# Patient Record
Sex: FEMALE | Race: WHITE | NOT HISPANIC OR LATINO | Employment: OTHER | URBAN - METROPOLITAN AREA
[De-identification: names, ages, dates, MRNs, and addresses within clinical notes are randomized per-mention and may not be internally consistent; named-entity substitution may affect disease eponyms.]

---

## 2023-03-06 ENCOUNTER — TELEPHONE (OUTPATIENT)
Dept: PRIMARY CARE | Facility: CLINIC | Age: 58
End: 2023-03-06
Payer: COMMERCIAL

## 2023-03-06 NOTE — TELEPHONE ENCOUNTER
----- Message from Kj Chowdhury sent at 3/6/2023  9:38 AM EST -----  Regarding: call backl  She called because she was having chest wall pain, and just wanted to talk with you about it!

## 2023-03-06 NOTE — TELEPHONE ENCOUNTER
Patient has occasional heartburn; she also states she has pain inbetween her breasts and back; mid back hurts at times as well. Advised to go to ER if concerned. Also to contact GI about continued heartburn despite Famotidine

## 2023-03-07 ENCOUNTER — TELEPHONE (OUTPATIENT)
Dept: PRIMARY CARE | Facility: CLINIC | Age: 58
End: 2023-03-07

## 2023-03-07 ENCOUNTER — APPOINTMENT (OUTPATIENT)
Dept: PRIMARY CARE | Facility: CLINIC | Age: 58
End: 2023-03-07
Payer: COMMERCIAL

## 2023-03-07 PROBLEM — L30.9 ECZEMA: Status: ACTIVE | Noted: 2023-03-07

## 2023-03-07 PROBLEM — K82.4 GALLBLADDER POLYP: Status: ACTIVE | Noted: 2023-03-07

## 2023-03-07 PROBLEM — Z04.9 CONDITION NOT FOUND: Status: ACTIVE | Noted: 2023-03-07

## 2023-03-07 PROBLEM — E66.3 OVERWEIGHT WITH BODY MASS INDEX (BMI) OF 25 TO 25.9 IN ADULT: Status: ACTIVE | Noted: 2023-03-07

## 2023-03-07 PROBLEM — H40.9 GLAUCOMA: Status: ACTIVE | Noted: 2023-03-07

## 2023-03-07 PROBLEM — E04.2 MULTIPLE THYROID NODULES: Status: ACTIVE | Noted: 2023-03-07

## 2023-03-07 PROBLEM — E55.9 VITAMIN D DEFICIENCY: Status: ACTIVE | Noted: 2023-03-07

## 2023-03-07 PROBLEM — H54.8 LEGALLY BLIND IN RIGHT EYE, AS DEFINED IN USA: Status: ACTIVE | Noted: 2023-03-07

## 2023-03-07 PROBLEM — M54.50 LOW BACK PAIN: Status: ACTIVE | Noted: 2023-03-07

## 2023-03-07 PROBLEM — M54.9 UPPER BACK PAIN: Status: ACTIVE | Noted: 2023-03-07

## 2023-03-07 PROBLEM — M79.7 FIBROMYALGIA: Status: ACTIVE | Noted: 2023-03-07

## 2023-03-07 PROBLEM — M79.601 MUSCULOSKELETAL PAIN OF RIGHT UPPER EXTREMITY: Status: ACTIVE | Noted: 2023-03-07

## 2023-03-07 PROBLEM — R73.03 PREDIABETES: Status: ACTIVE | Noted: 2023-03-07

## 2023-03-07 PROBLEM — J30.2 SEASONAL ALLERGIES: Status: ACTIVE | Noted: 2023-03-07

## 2023-03-07 PROBLEM — G43.909 MIGRAINE: Status: ACTIVE | Noted: 2023-03-07

## 2023-03-07 PROBLEM — R74.8 ELEVATED LIPASE: Status: ACTIVE | Noted: 2023-03-07

## 2023-03-07 PROBLEM — K59.00 CONSTIPATION: Status: ACTIVE | Noted: 2023-03-07

## 2023-03-07 PROBLEM — M25.551 RIGHT HIP PAIN: Status: ACTIVE | Noted: 2023-03-07

## 2023-03-07 PROBLEM — K85.90 PANCREATITIS, ACUTE (HHS-HCC): Status: ACTIVE | Noted: 2023-03-07

## 2023-03-07 PROBLEM — K76.0 FATTY LIVER: Status: ACTIVE | Noted: 2023-03-07

## 2023-03-07 PROBLEM — F41.9 ANXIETY DISORDER: Status: ACTIVE | Noted: 2023-03-07

## 2023-03-07 PROBLEM — L68.0 HIRSUTISM: Status: ACTIVE | Noted: 2023-03-07

## 2023-03-07 PROBLEM — R07.81 CHEST PAIN, PLEURITIC: Status: ACTIVE | Noted: 2023-03-07

## 2023-03-07 PROBLEM — M19.90 OSTEOARTHRITIS (ARTHRITIS DUE TO WEAR AND TEAR OF JOINTS): Status: ACTIVE | Noted: 2023-03-07

## 2023-03-07 PROBLEM — K58.9 IRRITABLE BOWEL SYNDROME: Status: ACTIVE | Noted: 2023-03-07

## 2023-03-07 PROBLEM — E78.00 HIGH BLOOD CHOLESTEROL: Status: ACTIVE | Noted: 2023-03-07

## 2023-03-07 PROBLEM — U07.1 COVID-19: Status: ACTIVE | Noted: 2023-03-07

## 2023-03-07 PROBLEM — D69.6 THROMBOCYTOPENIA (CMS-HCC): Status: ACTIVE | Noted: 2023-03-07

## 2023-03-07 PROBLEM — K82.8 GALLBLADDER SLUDGE: Status: ACTIVE | Noted: 2023-03-07

## 2023-03-07 PROBLEM — K21.9 GASTROESOPHAGEAL REFLUX DISEASE: Status: ACTIVE | Noted: 2023-03-07

## 2023-03-07 PROBLEM — E66.9 OBESITY: Status: ACTIVE | Noted: 2023-03-07

## 2023-03-07 RX ORDER — LATANOPROST 50 UG/ML
SOLUTION/ DROPS OPHTHALMIC
COMMUNITY
Start: 2016-05-23

## 2023-03-07 RX ORDER — METFORMIN HYDROCHLORIDE 500 MG/1
500 TABLET ORAL DAILY
COMMUNITY
Start: 2022-05-25 | End: 2023-06-08 | Stop reason: SDUPTHER

## 2023-03-07 RX ORDER — POLYETHYLENE GLYCOL 3350 17 G/17G
17-34 POWDER, FOR SOLUTION ORAL DAILY
COMMUNITY
End: 2023-10-10 | Stop reason: ALTCHOICE

## 2023-03-07 RX ORDER — LIDOCAINE 50 MG/G
1 OINTMENT TOPICAL 3 TIMES DAILY
COMMUNITY
End: 2023-08-19 | Stop reason: ALTCHOICE

## 2023-03-07 RX ORDER — ATORVASTATIN CALCIUM 20 MG/1
20 TABLET, FILM COATED ORAL DAILY
COMMUNITY
Start: 2013-07-22 | End: 2023-05-18

## 2023-03-07 RX ORDER — GABAPENTIN 300 MG/1
300 CAPSULE ORAL 2 TIMES DAILY
COMMUNITY
Start: 2017-08-23

## 2023-03-07 RX ORDER — PNV NO.95/FERROUS FUM/FOLIC AC 28MG-0.8MG
100 TABLET ORAL DAILY
COMMUNITY
End: 2023-03-18 | Stop reason: ALTCHOICE

## 2023-03-07 RX ORDER — ACETAMINOPHEN 500 MG
50 TABLET ORAL DAILY PRN
COMMUNITY
Start: 2017-05-23 | End: 2023-03-18 | Stop reason: ALTCHOICE

## 2023-03-07 RX ORDER — DICYCLOMINE HYDROCHLORIDE 20 MG/1
20 TABLET ORAL EVERY 6 HOURS PRN
COMMUNITY
Start: 2019-11-04

## 2023-03-07 RX ORDER — MECLIZINE HYDROCHLORIDE 25 MG/1
25 TABLET ORAL 2 TIMES DAILY PRN
COMMUNITY
Start: 2019-07-15

## 2023-03-07 RX ORDER — AMMONIUM LACTATE 12 G/100G
1 LOTION TOPICAL DAILY
COMMUNITY
Start: 2017-02-16

## 2023-03-07 RX ORDER — BRIMONIDINE TARTRATE 1 MG/ML
SOLUTION/ DROPS OPHTHALMIC
COMMUNITY
End: 2023-08-19 | Stop reason: ALTCHOICE

## 2023-03-07 RX ORDER — ACETAMINOPHEN 500 MG
500 TABLET ORAL
COMMUNITY
Start: 2015-06-17

## 2023-03-16 ASSESSMENT — ENCOUNTER SYMPTOMS
HOARSE VOICE: 0
HEADACHES: 1
TROUBLE SWALLOWING: 1
SWOLLEN GLANDS: 0
ABDOMINAL PAIN: 1
NECK PAIN: 1
COUGH: 1
DIARRHEA: 1
SORE THROAT: 1
STRIDOR: 0
SHORTNESS OF BREATH: 1
VOMITING: 0

## 2023-03-17 ENCOUNTER — OFFICE VISIT (OUTPATIENT)
Dept: PRIMARY CARE | Facility: CLINIC | Age: 58
End: 2023-03-17
Payer: COMMERCIAL

## 2023-03-17 VITALS
HEART RATE: 59 BPM | HEIGHT: 59 IN | DIASTOLIC BLOOD PRESSURE: 72 MMHG | OXYGEN SATURATION: 98 % | WEIGHT: 121 LBS | BODY MASS INDEX: 24.39 KG/M2 | TEMPERATURE: 98 F | SYSTOLIC BLOOD PRESSURE: 121 MMHG

## 2023-03-17 DIAGNOSIS — R07.81 CHEST PAIN, PLEURITIC: ICD-10-CM

## 2023-03-17 DIAGNOSIS — R93.89 ABNORMAL CHEST X-RAY: ICD-10-CM

## 2023-03-17 DIAGNOSIS — R07.89 MUSCULOSKELETAL CHEST PAIN: Primary | ICD-10-CM

## 2023-03-17 DIAGNOSIS — R73.03 PREDIABETES: ICD-10-CM

## 2023-03-17 DIAGNOSIS — D69.6 THROMBOCYTOPENIA (CMS-HCC): ICD-10-CM

## 2023-03-17 LAB — POC FINGERSTICK BLOOD GLUCOSE: 89 MG/DL (ref 70–100)

## 2023-03-17 PROCEDURE — 99214 OFFICE O/P EST MOD 30 MIN: CPT | Performed by: PEDIATRICS

## 2023-03-17 PROCEDURE — 82962 GLUCOSE BLOOD TEST: CPT | Performed by: PEDIATRICS

## 2023-03-17 NOTE — PROGRESS NOTES
"Subjective   Occasional chest pain    HPI   Mady Chapa is a 57 y.o. female who presents for dry throat, and sometimes pain in her back and chest when  she breathes. These symptoms have been for a week. She had Covid at [facility] time. Slight cough started a week ago. She also has vague abdominal pains. She states she had an abnormal HIDA and will be seeing a surgeon.    Review of Systems    Objective   /72 (BP Location: Left arm, Patient Position: Sitting)   Pulse 59   Temp 36.7 °C (98 °F)   Ht 1.499 m (4' 11\")   Wt 54.9 kg (121 lb)   SpO2 98%   BMI 24.44 kg/m²     Physical Exam  Throat and oral mucus membranes appear dry  Left  chest wall tender to touch  Lungs clear   Heart reg  Abd soft  Assessment/Plan   Problem List Items Addressed This Visit          Respiratory    Chest pain, pleuritic     Obtained chest xray which showed a vague density in right mid lung. Will order CT            Endocrine/Metabolic    Prediabetes    Relevant Orders    POCT fingerstick glucose manually resulted (Completed)     Other Visit Diagnoses       Musculoskeletal chest pain    -  Primary    Relevant Orders    XR chest 2 views (Completed)    Abnormal chest x-ray        Relevant Orders    CT chest wo IV contrast               "

## 2023-03-18 NOTE — PROGRESS NOTES
Answers submitted by the patient for this visit:  Sore Throat Questionnaire (Submitted on 3/16/2023)  Chief Complaint: Sore throat  Chronicity: new  Onset: 1 to 4 weeks ago  Progression since onset: waxing and waning  Pain worse on: neither  Fever: no fever  Pain - numeric: 5/10  abdominal pain: Yes  congestion: Yes  cough: Yes  diarrhea: Yes  drooling: No  ear discharge: No  ear pain: No  headaches: Yes  hoarse voice: No  neck pain: Yes  plugged ear sensation: No  shortness of breath: Yes  stridor: No  swollen glands: No  trouble swallowing: Yes  vomiting: No  strep: No  mono: No

## 2023-03-29 ENCOUNTER — TELEPHONE (OUTPATIENT)
Dept: PRIMARY CARE | Facility: CLINIC | Age: 58
End: 2023-03-29

## 2023-04-18 ENCOUNTER — TELEPHONE (OUTPATIENT)
Dept: PRIMARY CARE | Facility: CLINIC | Age: 58
End: 2023-04-18
Payer: COMMERCIAL

## 2023-04-22 NOTE — TELEPHONE ENCOUNTER
Told patient report stated she had a nodule and to repeat the test in 6 months. However, I do not see report in chart; it was faxed.

## 2023-04-24 DIAGNOSIS — R91.1 LUNG NODULE: Primary | ICD-10-CM

## 2023-05-02 ENCOUNTER — TELEPHONE (OUTPATIENT)
Dept: PRIMARY CARE | Facility: CLINIC | Age: 58
End: 2023-05-02
Payer: COMMERCIAL

## 2023-05-10 DIAGNOSIS — M25.551 PAIN OF RIGHT HIP: Primary | ICD-10-CM

## 2023-05-15 DIAGNOSIS — E78.00 HIGH BLOOD CHOLESTEROL: Primary | ICD-10-CM

## 2023-05-18 RX ORDER — ATORVASTATIN CALCIUM 20 MG/1
TABLET, FILM COATED ORAL
Qty: 100 TABLET | Refills: 2 | Status: SHIPPED | OUTPATIENT
Start: 2023-05-18 | End: 2023-06-08 | Stop reason: SDUPTHER

## 2023-05-19 ENCOUNTER — TELEPHONE (OUTPATIENT)
Dept: PRIMARY CARE | Facility: CLINIC | Age: 58
End: 2023-05-19

## 2023-05-24 ENCOUNTER — APPOINTMENT (OUTPATIENT)
Dept: PRIMARY CARE | Facility: CLINIC | Age: 58
End: 2023-05-24
Payer: COMMERCIAL

## 2023-05-31 NOTE — TELEPHONE ENCOUNTER
Recommended icing it unless a lot of pain and black under nail in which case it may be a subungal hematoma

## 2023-06-08 ENCOUNTER — OFFICE VISIT (OUTPATIENT)
Dept: PRIMARY CARE | Facility: CLINIC | Age: 58
End: 2023-06-08
Payer: COMMERCIAL

## 2023-06-08 VITALS
HEART RATE: 58 BPM | TEMPERATURE: 96.8 F | DIASTOLIC BLOOD PRESSURE: 75 MMHG | OXYGEN SATURATION: 97 % | RESPIRATION RATE: 16 BRPM | SYSTOLIC BLOOD PRESSURE: 130 MMHG | BODY MASS INDEX: 25.6 KG/M2 | HEIGHT: 59 IN | WEIGHT: 127 LBS

## 2023-06-08 DIAGNOSIS — K59.00 CONSTIPATION, UNSPECIFIED CONSTIPATION TYPE: ICD-10-CM

## 2023-06-08 DIAGNOSIS — K21.9 GASTROESOPHAGEAL REFLUX DISEASE, UNSPECIFIED WHETHER ESOPHAGITIS PRESENT: ICD-10-CM

## 2023-06-08 DIAGNOSIS — R73.03 PREDIABETES: ICD-10-CM

## 2023-06-08 DIAGNOSIS — F41.9 ANXIETY DISORDER, UNSPECIFIED TYPE: ICD-10-CM

## 2023-06-08 DIAGNOSIS — E78.00 HIGH BLOOD CHOLESTEROL: ICD-10-CM

## 2023-06-08 DIAGNOSIS — G43.809 OTHER MIGRAINE WITHOUT STATUS MIGRAINOSUS, NOT INTRACTABLE: ICD-10-CM

## 2023-06-08 DIAGNOSIS — K85.90 ACUTE PANCREATITIS, UNSPECIFIED COMPLICATION STATUS, UNSPECIFIED PANCREATITIS TYPE (HHS-HCC): ICD-10-CM

## 2023-06-08 DIAGNOSIS — M79.7 FIBROMYALGIA: ICD-10-CM

## 2023-06-08 DIAGNOSIS — D69.6 THROMBOCYTOPENIA (CMS-HCC): ICD-10-CM

## 2023-06-08 DIAGNOSIS — R07.81 CHEST PAIN, PLEURITIC: ICD-10-CM

## 2023-06-08 DIAGNOSIS — Z00.00 MEDICARE ANNUAL WELLNESS VISIT, SUBSEQUENT: Primary | ICD-10-CM

## 2023-06-08 DIAGNOSIS — K76.0 FATTY LIVER: ICD-10-CM

## 2023-06-08 DIAGNOSIS — E04.2 MULTIPLE THYROID NODULES: ICD-10-CM

## 2023-06-08 PROBLEM — M79.601 MUSCULOSKELETAL PAIN OF RIGHT UPPER EXTREMITY: Status: RESOLVED | Noted: 2023-03-07 | Resolved: 2023-06-08

## 2023-06-08 PROBLEM — M25.551 RIGHT HIP PAIN: Status: RESOLVED | Noted: 2023-03-07 | Resolved: 2023-06-08

## 2023-06-08 PROBLEM — U07.1 COVID-19: Status: RESOLVED | Noted: 2023-03-07 | Resolved: 2023-06-08

## 2023-06-08 PROBLEM — M54.50 LOW BACK PAIN: Status: RESOLVED | Noted: 2023-03-07 | Resolved: 2023-06-08

## 2023-06-08 PROBLEM — M54.9 UPPER BACK PAIN: Status: RESOLVED | Noted: 2023-03-07 | Resolved: 2023-06-08

## 2023-06-08 PROCEDURE — 99213 OFFICE O/P EST LOW 20 MIN: CPT | Performed by: PEDIATRICS

## 2023-06-08 PROCEDURE — G0439 PPPS, SUBSEQ VISIT: HCPCS | Performed by: PEDIATRICS

## 2023-06-08 PROCEDURE — 1036F TOBACCO NON-USER: CPT | Performed by: PEDIATRICS

## 2023-06-08 RX ORDER — DOCUSATE SODIUM 100 MG/1
100 CAPSULE, LIQUID FILLED ORAL 2 TIMES DAILY
Qty: 180 CAPSULE | Refills: 3 | Status: SHIPPED | OUTPATIENT
Start: 2023-06-08 | End: 2023-08-19 | Stop reason: ALTCHOICE

## 2023-06-08 RX ORDER — METFORMIN HYDROCHLORIDE 500 MG/1
500 TABLET ORAL DAILY
Qty: 90 TABLET | Refills: 1 | Status: SHIPPED | OUTPATIENT
Start: 2023-06-08 | End: 2023-09-13

## 2023-06-08 RX ORDER — LORATADINE 10 MG/1
10 TABLET ORAL
COMMUNITY
Start: 2015-09-29

## 2023-06-08 RX ORDER — ATORVASTATIN CALCIUM 20 MG/1
20 TABLET, FILM COATED ORAL DAILY
Qty: 90 TABLET | Refills: 2 | Status: SHIPPED | OUTPATIENT
Start: 2023-06-08 | End: 2023-11-21

## 2023-06-08 ASSESSMENT — PAIN SCALES - GENERAL: PAINLEVEL: 6

## 2023-06-08 ASSESSMENT — PATIENT HEALTH QUESTIONNAIRE - PHQ9
SUM OF ALL RESPONSES TO PHQ9 QUESTIONS 1 AND 2: 0
1. LITTLE INTEREST OR PLEASURE IN DOING THINGS: NOT AT ALL
2. FEELING DOWN, DEPRESSED OR HOPELESS: NOT AT ALL

## 2023-06-08 ASSESSMENT — ACTIVITIES OF DAILY LIVING (ADL)
BATHING: INDEPENDENT
DOING_HOUSEWORK: INDEPENDENT
MANAGING_FINANCES: INDEPENDENT
DRESSING: INDEPENDENT
TAKING_MEDICATION: INDEPENDENT
GROCERY_SHOPPING: INDEPENDENT

## 2023-06-08 NOTE — ASSESSMENT & PLAN NOTE
Occasional; sometimes worse with a deep breath; pushing on the area aggravates it when she gets it; occurs once a week; taking Tylenol helps

## 2023-06-08 NOTE — PROGRESS NOTES
"Subjective   Reason for Visit: Mady Chapa is an 58 y.o. female here for a Medicare Wellness visit.          Reviewed all medications by prescribing practitioner or clinical pharmacist (such as prescriptions, OTCs, herbal therapies and supplements) and documented in the medical record.    HPI  Patient with emotional disorder, migraine, prediabetes and thyroid nodules is here for check up. She sees GYN at [facility] who orders her mammogram.  Her main complaint is right lower abdominal pain that occurs once or twice a week  and may be 6/10 and may last a couple of days; Passing gas or having BM sometimes helps;     Had colonoscopy in 2020. Next one due 2025  Patient Care Team:  Sil Pike MD as PCP - General  Sil Pike MD as PCP - [employer] PCP     Review of Systems  Spilled hot coffee on legs this morning with no untoward effect;  Closed left fourth nail in a door a week ago;   Objective   Vitals:  /75 (BP Location: Left arm, Patient Position: Sitting, BP Cuff Size: Adult)   Pulse 58   Temp 36 °C (96.8 °F) (Temporal)   Resp 16   Ht 1.499 m (4' 11\")   Wt 57.6 kg (127 lb)   LMP  (LMP Unknown)   SpO2 97%   BMI 25.65 kg/m²       Physical Exam  Lungs clear  Heart reg  Abd soft and nontender  Tender fibromyalgia trigger points; tender iliac crests  Left fourth digit has a speck of subungal black discoloration in middle of the nail  Assessment/Plan   Problem List Items Addressed This Visit          Other    High blood cholesterol     Problem List Items Addressed This Visit          Respiratory    Chest pain, pleuritic - Primary    Current Assessment & Plan     Occasional; sometimes worse with a deep breath; pushing on the area aggravates it when she gets it; occurs once a week; taking Tylenol helps            Digestive    Constipation    Current Assessment & Plan     Takes Miralax for that along with Benefiber; has Docusate as well if needed         Fatty liver    Current Assessment & Plan "     Sees Dr Hanna Martin at [facility]         Gastroesophageal reflux disease    Current Assessment & Plan     Takes Famotidine once or twice a week; may get throat pain if her heartburn acts up         Pancreatitis, acute    Current Assessment & Plan     Had acute pancreatitis in January; She will get MRI of pancreas this month.            Endocrine/Metabolic    Multiple thyroid nodules    Prediabetes       Hematologic    Thrombocytopenia (CMS/HCC)    Current Assessment & Plan     Sees hematologist for this            Other    Anxiety disorder    Current Assessment & Plan     Was seeing a counselor; willing to talk to a psychiatrist         High blood cholesterol    Migraine    Current Assessment & Plan     Dr Flakita Coyle neurologist treats her with butalbital or Relpax

## 2023-06-09 ENCOUNTER — TELEPHONE (OUTPATIENT)
Dept: PRIMARY CARE | Facility: CLINIC | Age: 58
End: 2023-06-09
Payer: COMMERCIAL

## 2023-06-20 ENCOUNTER — TELEPHONE (OUTPATIENT)
Dept: PRIMARY CARE | Facility: CLINIC | Age: 58
End: 2023-06-20
Payer: COMMERCIAL

## 2023-07-07 ENCOUNTER — TELEPHONE (OUTPATIENT)
Dept: PRIMARY CARE | Facility: CLINIC | Age: 58
End: 2023-07-07
Payer: COMMERCIAL

## 2023-07-16 ASSESSMENT — ENCOUNTER SYMPTOMS
ABDOMINAL PAIN: 1
DIARRHEA: 1
DYSURIA: 0
MYALGIAS: 1
ARTHRALGIAS: 1
FLATUS: 0
VOMITING: 0
ANOREXIA: 0
HEMATOCHEZIA: 0
HEMATURIA: 0
NAUSEA: 0
WEIGHT LOSS: 0
CONSTIPATION: 1
FREQUENCY: 0
HEADACHES: 1
FEVER: 0

## 2023-07-18 ASSESSMENT — ENCOUNTER SYMPTOMS
WEIGHT LOSS: 0
DIARRHEA: 1
NAUSEA: 0
ANOREXIA: 0
FEVER: 0
HEADACHES: 1
DYSURIA: 0
ARTHRALGIAS: 1
MYALGIAS: 1
ABDOMINAL PAIN: 1
HEMATOCHEZIA: 0
FREQUENCY: 0
CONSTIPATION: 1
FLATUS: 0
VOMITING: 0
HEMATURIA: 0

## 2023-07-19 ENCOUNTER — APPOINTMENT (OUTPATIENT)
Dept: PRIMARY CARE | Facility: CLINIC | Age: 58
End: 2023-07-19
Payer: COMMERCIAL

## 2023-07-19 ENCOUNTER — TELEPHONE (OUTPATIENT)
Dept: PRIMARY CARE | Facility: CLINIC | Age: 58
End: 2023-07-19

## 2023-08-02 ENCOUNTER — TELEPHONE (OUTPATIENT)
Dept: PRIMARY CARE | Facility: CLINIC | Age: 58
End: 2023-08-02
Payer: COMMERCIAL

## 2023-08-10 ENCOUNTER — TELEPHONE (OUTPATIENT)
Dept: PRIMARY CARE | Facility: CLINIC | Age: 58
End: 2023-08-10

## 2023-08-11 ENCOUNTER — APPOINTMENT (OUTPATIENT)
Dept: PRIMARY CARE | Facility: CLINIC | Age: 58
End: 2023-08-11
Payer: COMMERCIAL

## 2023-08-15 ENCOUNTER — OFFICE VISIT (OUTPATIENT)
Dept: PRIMARY CARE | Facility: CLINIC | Age: 58
End: 2023-08-15
Payer: COMMERCIAL

## 2023-08-15 VITALS
BODY MASS INDEX: 26.41 KG/M2 | SYSTOLIC BLOOD PRESSURE: 136 MMHG | WEIGHT: 131 LBS | RESPIRATION RATE: 16 BRPM | HEIGHT: 59 IN | TEMPERATURE: 97.1 F | HEART RATE: 54 BPM | OXYGEN SATURATION: 98 % | DIASTOLIC BLOOD PRESSURE: 73 MMHG

## 2023-08-15 DIAGNOSIS — R07.81 CHEST PAIN, PLEURITIC: Primary | ICD-10-CM

## 2023-08-15 DIAGNOSIS — E78.00 HIGH BLOOD CHOLESTEROL: ICD-10-CM

## 2023-08-15 DIAGNOSIS — M54.6 ACUTE MIDLINE THORACIC BACK PAIN: ICD-10-CM

## 2023-08-15 PROCEDURE — 1036F TOBACCO NON-USER: CPT | Performed by: PEDIATRICS

## 2023-08-15 PROCEDURE — 93000 ELECTROCARDIOGRAM COMPLETE: CPT | Performed by: PEDIATRICS

## 2023-08-15 PROCEDURE — 99214 OFFICE O/P EST MOD 30 MIN: CPT | Performed by: PEDIATRICS

## 2023-08-15 RX ORDER — HYDROGEN PEROXIDE 3 %
SOLUTION, NON-ORAL MISCELLANEOUS
COMMUNITY
End: 2023-08-19 | Stop reason: ALTCHOICE

## 2023-08-15 RX ORDER — SPIRONOLACTONE 50 MG/1
TABLET, FILM COATED ORAL
COMMUNITY
Start: 2023-07-27 | End: 2023-08-19 | Stop reason: ALTCHOICE

## 2023-08-15 ASSESSMENT — PAIN SCALES - GENERAL: PAINLEVEL: 6

## 2023-08-15 ASSESSMENT — PATIENT HEALTH QUESTIONNAIRE - PHQ9
SUM OF ALL RESPONSES TO PHQ9 QUESTIONS 1 AND 2: 0
2. FEELING DOWN, DEPRESSED OR HOPELESS: NOT AT ALL
1. LITTLE INTEREST OR PLEASURE IN DOING THINGS: NOT AT ALL

## 2023-08-15 NOTE — PROGRESS NOTES
"Subjective   Patient ID: Mady Chapa is a 58 y.o. female who presents for Back Pain, Hip Pain, and Chest Pain.    HPI   Patient said that she has back pain that hurts in between her shoulder blades for 2 weeks; she is better with ice or Tylenol; No injuries; the other day, she almost tripped causing her twist and grab onto a table; When she did that she felt a pain in left hip that lasted a couple of days but got better with ice and Tylenol. Pain in back is 6/10 and pain in 5/10.  She states that sometimes it is hard to breathe when her back hurts.   She felt a stabbing pain in her chest this morning; she pressed on her chest and found it was sore. She went back to sleep. She has a little chest pain still and it is a bit pleuritic in nature; she is sometimes short of breath she states as well with this.    Review of Systems    Objective   /73 (BP Location: Left arm, Patient Position: Sitting, BP Cuff Size: Adult)   Pulse 54   Temp 36.2 °C (97.1 °F) (Temporal)   Resp 16   Ht 1.499 m (4' 11\")   Wt 59.4 kg (131 lb)   SpO2 98%   BMI 26.46 kg/m²     Physical Exam  Lungs clear  Heart reg  Cannot reproduce chest pain at this time  Mid back somewhat tender (mildly)  Good ROM of left hip  Assessment/Plan   Problem List Items Addressed This Visit       Chest pain, pleuritic - Primary    Relevant Orders    ECG 12 lead (Clinic Performed) (Completed)    CT angio chest for pulmonary embolism (Completed)    High blood cholesterol    Relevant Orders    CT cardiac scoring wo IV contrast    Thoracic back pain          "

## 2023-08-15 NOTE — TELEPHONE ENCOUNTER
CARDIAC SCORE TEST ORDER NEEDS TO BE FAXED TO [facility].  PLEASE FAX OVER ORDER  FAX# [phone]  PLEASE CALL ( NEGRITO) WITH EKG RESULTS FROM TODAY

## 2023-08-18 DIAGNOSIS — R93.89 ABNORMAL CHEST CT: Primary | ICD-10-CM

## 2023-08-21 ENCOUNTER — HOSPITAL ENCOUNTER (OUTPATIENT)
Dept: DATA CONVERSION | Facility: HOSPITAL | Age: 58
End: 2023-08-21

## 2023-08-21 DIAGNOSIS — R93.89 ABNORMAL FINDINGS ON DIAGNOSTIC IMAGING OF OTHER SPECIFIED BODY STRUCTURES: ICD-10-CM

## 2023-08-25 PROBLEM — N60.19 FIBROCYSTIC BREAST DISEASE (FCBD): Status: ACTIVE | Noted: 2023-08-25

## 2023-08-25 PROBLEM — G90.09 IDIOPATHIC PERIPHERAL AUTONOMIC NEUROPATHY: Status: ACTIVE | Noted: 2023-08-25

## 2023-08-25 PROBLEM — N20.0 KIDNEY STONE: Status: ACTIVE | Noted: 2017-02-28

## 2023-08-25 PROBLEM — K81.1 CHRONIC CHOLECYSTITIS: Status: ACTIVE | Noted: 2023-03-21

## 2023-08-25 PROBLEM — R93.5 ABNORMAL CT OF THE ABDOMEN: Status: ACTIVE | Noted: 2023-01-09

## 2023-08-25 PROBLEM — N95.1 MENOPAUSAL SYMPTOMS: Status: ACTIVE | Noted: 2023-08-25

## 2023-08-25 PROBLEM — R27.0 ATAXIA: Status: ACTIVE | Noted: 2023-08-25

## 2023-08-25 PROBLEM — I83.899 VARICOSE VEINS OF LEG WITH SWELLING: Status: ACTIVE | Noted: 2023-08-25

## 2023-09-11 DIAGNOSIS — R73.03 PREDIABETES: ICD-10-CM

## 2023-09-12 ENCOUNTER — APPOINTMENT (OUTPATIENT)
Dept: PRIMARY CARE | Facility: CLINIC | Age: 58
End: 2023-09-12
Payer: COMMERCIAL

## 2023-09-13 RX ORDER — METFORMIN HYDROCHLORIDE 500 MG/1
500 TABLET ORAL DAILY
Qty: 100 TABLET | Refills: 2 | Status: SHIPPED | OUTPATIENT
Start: 2023-09-13 | End: 2023-09-16

## 2023-09-14 DIAGNOSIS — R73.03 PREDIABETES: ICD-10-CM

## 2023-09-16 RX ORDER — METFORMIN HYDROCHLORIDE 500 MG/1
500 TABLET ORAL DAILY
Qty: 100 TABLET | Refills: 2 | Status: SHIPPED | OUTPATIENT
Start: 2023-09-16 | End: 2024-06-06

## 2023-10-05 ENCOUNTER — TELEPHONE (OUTPATIENT)
Dept: PRIMARY CARE | Facility: CLINIC | Age: 58
End: 2023-10-05

## 2023-10-05 NOTE — TELEPHONE ENCOUNTER
"Patient called \"MADY HAS PAIN -Left shoulder and middle left breast - took tylenol - very little help---but did help --still hurts presses on left breast it does hurt when she presses, acid in throat sensation please call --Mady [phone]  "

## 2023-10-08 ASSESSMENT — ENCOUNTER SYMPTOMS
NAUSEA: 0
ARTHRALGIAS: 1
HEMATOCHEZIA: 0
VOMITING: 0
HEADACHES: 1
CONSTIPATION: 1
FEVER: 0
ANOREXIA: 0
DYSURIA: 0
FREQUENCY: 0
FLATUS: 0
ABDOMINAL PAIN: 1
DIARRHEA: 1
MYALGIAS: 1
WEIGHT LOSS: 0
HEMATURIA: 0

## 2023-10-09 ENCOUNTER — TELEPHONE (OUTPATIENT)
Dept: HEMATOLOGY/ONCOLOGY | Facility: CLINIC | Age: 58
End: 2023-10-09
Payer: COMMERCIAL

## 2023-10-10 ENCOUNTER — OFFICE VISIT (OUTPATIENT)
Dept: PRIMARY CARE | Facility: CLINIC | Age: 58
End: 2023-10-10
Payer: COMMERCIAL

## 2023-10-10 VITALS
HEART RATE: 53 BPM | SYSTOLIC BLOOD PRESSURE: 132 MMHG | BODY MASS INDEX: 26.61 KG/M2 | WEIGHT: 132 LBS | RESPIRATION RATE: 16 BRPM | HEIGHT: 59 IN | OXYGEN SATURATION: 97 % | TEMPERATURE: 97.1 F | DIASTOLIC BLOOD PRESSURE: 70 MMHG

## 2023-10-10 DIAGNOSIS — N60.19 FIBROCYSTIC BREAST DISEASE (FCBD), UNSPECIFIED LATERALITY: ICD-10-CM

## 2023-10-10 DIAGNOSIS — M79.7 FIBROMYALGIA: ICD-10-CM

## 2023-10-10 DIAGNOSIS — K81.1 CHRONIC CHOLECYSTITIS: ICD-10-CM

## 2023-10-10 DIAGNOSIS — J30.2 SEASONAL ALLERGIES: ICD-10-CM

## 2023-10-10 DIAGNOSIS — N95.1 MENOPAUSAL SYMPTOM: ICD-10-CM

## 2023-10-10 DIAGNOSIS — F41.9 ANXIETY DISORDER, UNSPECIFIED TYPE: ICD-10-CM

## 2023-10-10 DIAGNOSIS — K76.0 FATTY LIVER: ICD-10-CM

## 2023-10-10 DIAGNOSIS — G43.809 OTHER MIGRAINE WITHOUT STATUS MIGRAINOSUS, NOT INTRACTABLE: ICD-10-CM

## 2023-10-10 DIAGNOSIS — E78.00 HIGH BLOOD CHOLESTEROL: ICD-10-CM

## 2023-10-10 DIAGNOSIS — R93.89 ABNORMAL CT OF THE CHEST: ICD-10-CM

## 2023-10-10 DIAGNOSIS — R07.81 CHEST PAIN, PLEURITIC: ICD-10-CM

## 2023-10-10 DIAGNOSIS — G90.09 IDIOPATHIC PERIPHERAL AUTONOMIC NEUROPATHY: ICD-10-CM

## 2023-10-10 DIAGNOSIS — K85.90 ACUTE PANCREATITIS, UNSPECIFIED COMPLICATION STATUS, UNSPECIFIED PANCREATITIS TYPE (HHS-HCC): ICD-10-CM

## 2023-10-10 DIAGNOSIS — E55.9 VITAMIN D DEFICIENCY: ICD-10-CM

## 2023-10-10 DIAGNOSIS — M79.18 MUSCULOSKELETAL PAIN: ICD-10-CM

## 2023-10-10 DIAGNOSIS — N20.0 KIDNEY STONE: ICD-10-CM

## 2023-10-10 DIAGNOSIS — K21.9 GASTROESOPHAGEAL REFLUX DISEASE, UNSPECIFIED WHETHER ESOPHAGITIS PRESENT: ICD-10-CM

## 2023-10-10 DIAGNOSIS — I83.899 VARICOSE VEINS OF LOWER EXTREMITY WITH EDEMA, UNSPECIFIED LATERALITY: ICD-10-CM

## 2023-10-10 DIAGNOSIS — R73.03 PREDIABETES: ICD-10-CM

## 2023-10-10 DIAGNOSIS — D69.6 THROMBOCYTOPENIA (CMS-HCC): ICD-10-CM

## 2023-10-10 DIAGNOSIS — E04.2 MULTIPLE THYROID NODULES: ICD-10-CM

## 2023-10-10 DIAGNOSIS — K59.00 CONSTIPATION, UNSPECIFIED CONSTIPATION TYPE: ICD-10-CM

## 2023-10-10 DIAGNOSIS — Z00.00 MEDICARE ANNUAL WELLNESS VISIT, SUBSEQUENT: Primary | ICD-10-CM

## 2023-10-10 PROBLEM — Z04.9 CONDITION NOT FOUND: Status: RESOLVED | Noted: 2023-03-07 | Resolved: 2023-10-10

## 2023-10-10 PROBLEM — R27.0 ATAXIA: Status: RESOLVED | Noted: 2023-08-25 | Resolved: 2023-10-10

## 2023-10-10 PROCEDURE — G0008 ADMIN INFLUENZA VIRUS VAC: HCPCS | Performed by: PEDIATRICS

## 2023-10-10 PROCEDURE — 90686 IIV4 VACC NO PRSV 0.5 ML IM: CPT | Performed by: PEDIATRICS

## 2023-10-10 PROCEDURE — 80053 COMPREHEN METABOLIC PANEL: CPT | Performed by: PEDIATRICS

## 2023-10-10 PROCEDURE — G0439 PPPS, SUBSEQ VISIT: HCPCS | Performed by: PEDIATRICS

## 2023-10-10 PROCEDURE — 1036F TOBACCO NON-USER: CPT | Performed by: PEDIATRICS

## 2023-10-10 PROCEDURE — 80061 LIPID PANEL: CPT | Performed by: PEDIATRICS

## 2023-10-10 PROCEDURE — 82306 VITAMIN D 25 HYDROXY: CPT | Performed by: PEDIATRICS

## 2023-10-10 RX ORDER — SPIRONOLACTONE 50 MG/1
1 TABLET, FILM COATED ORAL 2 TIMES DAILY
COMMUNITY
Start: 2023-10-04 | End: 2023-10-10 | Stop reason: ALTCHOICE

## 2023-10-10 RX ORDER — LIDOCAINE 40 MG/G
CREAM TOPICAL 4 TIMES DAILY PRN
Qty: 50 G | Refills: 3 | Status: SHIPPED | OUTPATIENT
Start: 2023-10-10 | End: 2023-10-24 | Stop reason: WASHOUT

## 2023-10-10 ASSESSMENT — PATIENT HEALTH QUESTIONNAIRE - PHQ9
10. IF YOU CHECKED OFF ANY PROBLEMS, HOW DIFFICULT HAVE THESE PROBLEMS MADE IT FOR YOU TO DO YOUR WORK, TAKE CARE OF THINGS AT HOME, OR GET ALONG WITH OTHER PEOPLE: NOT DIFFICULT AT ALL
2. FEELING DOWN, DEPRESSED OR HOPELESS: SEVERAL DAYS
SUM OF ALL RESPONSES TO PHQ9 QUESTIONS 1 AND 2: 1
1. LITTLE INTEREST OR PLEASURE IN DOING THINGS: NOT AT ALL

## 2023-10-10 ASSESSMENT — ENCOUNTER SYMPTOMS
HEMATOCHEZIA: 0
VOMITING: 0
HEMATURIA: 0
HEADACHES: 1
FREQUENCY: 0
WEIGHT LOSS: 0
ANOREXIA: 0
CONSTIPATION: 1
FEVER: 0
ABDOMINAL PAIN: 1
DYSURIA: 0
DIARRHEA: 1
NAUSEA: 0
ARTHRALGIAS: 1
MYALGIAS: 1
FLATUS: 0

## 2023-10-10 ASSESSMENT — PAIN SCALES - GENERAL: PAINLEVEL: 6

## 2023-10-10 NOTE — PROGRESS NOTES
"Subjective   Patient ID: Mady Chapa is a 58 y.o. female who presents for prediabetes    Abdominal Pain  This is a recurrent problem. The current episode started more than 1 year ago. The onset quality is undetermined. The problem occurs every several days. The most recent episode lasted 4 days. The problem has been waxing and waning. The pain is located in the generalized abdominal region and right flank. The pain is at a severity of 7/10. The quality of the pain is sharp. The abdominal pain radiates to the LUQ, RUQ, periumbilical region, suprapubic region, back and right flank. Associated symptoms include arthralgias, constipation, diarrhea, headaches and myalgias. Pertinent negatives include no anorexia, dysuria, fever, flatus, frequency, hematochezia, hematuria, melena, nausea, vomiting or weight loss. The pain is aggravated by certain positions and movement. The pain is relieved by Activity.      GYN orders mammogram  Colonoscopy due 2025  Review of Systems   Constitutional:  Negative for fever and weight loss.   Gastrointestinal:  Positive for abdominal pain, constipation and diarrhea. Negative for anorexia, flatus, hematochezia, melena, nausea and vomiting.   Genitourinary:  Negative for dysuria, frequency and hematuria.   Musculoskeletal:  Positive for arthralgias and myalgias.   Neurological:  Positive for headaches.     Stubbed her left fourth toe; right sacrum slightly tender  Objective   /70 (BP Location: Right arm, Patient Position: Sitting, BP Cuff Size: Adult)   Pulse 53   Temp 36.2 °C (97.1 °F) (Temporal)   Resp 16   Ht 1.499 m (4' 11\")   Wt 59.9 kg (132 lb)   LMP  (LMP Unknown)   SpO2 97%   BMI 26.66 kg/m²     Physical Exam  Vitals reviewed.   Constitutional:       General: She is not in acute distress.  HENT:      Head: Normocephalic.      Right Ear: Tympanic membrane normal.      Left Ear: Tympanic membrane normal.      Nose: Nose normal.      Mouth/Throat:      Pharynx: Oropharynx " is clear.   Cardiovascular:      Rate and Rhythm: Normal rate and regular rhythm.      Heart sounds: Normal heart sounds.   Pulmonary:      Breath sounds: Normal breath sounds.   Abdominal:      Palpations: Abdomen is soft.      Tenderness: There is no abdominal tenderness.   Musculoskeletal:         General: No tenderness.      Comments: Tender right sacroiliac   Skin:     Findings: No rash.   Neurological:      General: No focal deficit present.      Mental Status: She is alert.   Psychiatric:         Mood and Affect: Mood normal.         Assessment/Plan   Problem List Items Addressed This Visit             ICD-10-CM    Anxiety disorder F41.9     Psychiatrist started her on duloxetine that will start soon         Constipation K59.00     Due to IBS, she has constipation alternating with diarrhea; she is on Metamucil and Miralax except when she has diarrhea         Chest pain, pleuritic R07.81     Once or twice her chest and back hurt; tylenol helps; reproduced by pressing on chest         Fatty liver K76.0     Patient asked to check if she had a fibroscan         Relevant Orders    Comprehensive Metabolic Panel    Fibromyalgia M79.7     Will start duloxetine; not bad pain         Gastroesophageal reflux disease K21.9     Doing well on Famotidine         High blood cholesterol E78.00     Takes Atorvastatin 20         Relevant Orders    Lipid Panel    Migraine G43.909     Gets it once or twice a week; sees dr Coyle         Multiple thyroid nodules E04.2     Saw Dr Luna last year who recommended repeat ultrasound which is scheduled         RESOLVED: Pancreatitis, acute K85.90    Prediabetes R73.03     Last sugar in the 80s         Seasonal allergies J30.2     Takes claritin prn         Thrombocytopenia (CMS/HCC) D69.6     Sees hematologist; platelets in the 80K range         Vitamin D deficiency E55.9    Relevant Orders    Vitamin D 25-Hydroxy,Total (for eval of Vitamin D levels)    Chronic cholecystitis K81.1      No more pain; seeing GI specialist and surgeon; will be following up in December         Fibrocystic breast disease (FCBD) N60.19     Breast pain is better by cutting caffeine         Idiopathic peripheral autonomic neuropathy G90.09     Numbness in hands and feet--followed by dr Coyle         Kidney stone N20.0     Followed by Dr Waldron         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

## 2023-10-10 NOTE — ASSESSMENT & PLAN NOTE
Due to IBS, she has constipation alternating with diarrhea; she is on Metamucil and Miralax except when she has diarrhea

## 2023-10-10 NOTE — PROGRESS NOTES
Pt. Is here for prediabetes follow up,right hip pain andAnswers submitted by the patient for this visit:  Abdominal Pain Questionnaire (Submitted on 10/8/2023)  Chief Complaint: Abdominal pain  Chronicity: recurrent  Onset: more than 1 year ago  Onset quality: undetermined  Frequency: every several days  Episode duration: 4 Days  Progression since onset: waxing and waning  Pain location: generalized abdominal region, right flank  Pain - numeric: 7/10  Pain quality: sharp  Radiates to: LUQ, RUQ, periumbilical region, suprapubic region, back, right flank  anorexia: No  arthralgias: Yes  constipation: Yes  diarrhea: Yes  dysuria: No  fever: No  flatus: No  frequency: No  headaches: Yes  hematochezia: No  hematuria: No  melena: No  myalgias: Yes  nausea: No  weight loss: No  vomiting: No  Aggravated by: certain positions, movement  Relieved by: activity   left toe pain.

## 2023-10-10 NOTE — PROGRESS NOTES
Answers submitted by the patient for this visit:  Abdominal Pain Questionnaire (Submitted on 10/8/2023)  Chief Complaint: Abdominal pain  Chronicity: recurrent  Onset: more than 1 year ago  Onset quality: undetermined  Frequency: every several days  Episode duration: 4 Days  Progression since onset: waxing and waning  Pain location: generalized abdominal region, right flank  Pain - numeric: 7/10  Pain quality: sharp  Radiates to: LUQ, RUQ, periumbilical region, suprapubic region, back, right flank  anorexia: No  arthralgias: Yes  constipation: Yes  diarrhea: Yes  dysuria: No  fever: No  flatus: No  frequency: No  headaches: Yes  hematochezia: No  hematuria: No  melena: No  myalgias: Yes  nausea: No  weight loss: No  vomiting: No  Aggravated by: certain positions, movement  Relieved by: activity

## 2023-10-13 DIAGNOSIS — D69.6 THROMBOCYTOPENIA (CMS-HCC): Primary | ICD-10-CM

## 2023-10-13 NOTE — TELEPHONE ENCOUNTER
Spoke to patient and explained per Ariel she can start the medication, however, will need monthly CBC's. Requested patient to call office when CBC is draw to review. Patient verbalized understanding and agreement regarding discussed information via verbal feedback.

## 2023-10-15 DIAGNOSIS — K76.0 FATTY LIVER: Primary | ICD-10-CM

## 2023-10-17 ENCOUNTER — TELEPHONE (OUTPATIENT)
Dept: PRIMARY CARE | Facility: CLINIC | Age: 58
End: 2023-10-17

## 2023-10-19 NOTE — PROGRESS NOTES
Patient: Mady Chapa    [id number]  : 1965 -- AGE 58 y.o.    Provider: TRAVIS Ryder-CNP     Location [facility]   Service Date: 10/20/2023       Department of Medicine  Division of Pulmonary, Critical Care, and Sleep Medicine           [facility]  New Visit Note    HISTORY OF PRESENT ILLNESS     The patient's referring provider is: Dr. Sil Pike    PCP: Dr. Sil Pike  Heme: Ariel Rosas CNP    HISTORY OF PRESENT ILLNESS   Mady Chapa is a 58 y.o. female who presents to a [facility] for an evaluation with concerns of Abnormal CT Scan. I have independently interviewed and examined the patient in the office and reviewed available records.    Patient had a CT chest on 2023 which showed focal area of tree-in-bud nodularity in the posterior segment of the right upper lobe with sequela of atypical infection (?  GAYLE/fungal).  A repeat CT chest was then completed 2023.  Finding showed persistent but slightly improved area of centrilobular and tree-in-bud nodularity in the right upper lobe.  I personally reviewed these images.  The 4 mm nodule in the right upper lobe is partially calcified and unchanged from the prior scan; the tree-in-bud inflammatory findings do appear slightly better from the previous scan; overall, not anything too overwhelming from my standpoint.    Current History  She is here today with her boyfriend and .    On today's visit, the patient reports that she reports some mild intermittent LADD. Can experience when going up stairs but this is nothing new and does not limit her daily activities. Feels like she has difficulty with belching as well? Sees a GI; talking about getting an endoscopy. Was advised Pepcid. Has a virtual appointment a couple weeks ago. Denies any chronic cough. On rare occasion of cough, it is not productive. Does have chronic sinus issues and takes  Claritin PRN; helpful when taken.  Denies wheezing. She denies fevers, chills. Intermittent sweats. Nothing out of the ordinary for her. Does have chest wall pain intermittently which sounds like costochondritis. Upon laying down, does have intermittent GERD and belching symptoms. She very occasionally will experience coughing with eating/drinking. Occurs maybe 1-2x a week. Weight has been stable.    Denies any childhood lung problems. Never been hospitalized for lung problems. Has had pneumonia and bronchitis before.    Has never had a sleep study before. She does snore. Boyfriend has noticed some abnormal breathing patterns. Has had episodes of choking/gasping. Does admit to daytime fatigue.  CAT: 8    REVIEW OF SYSTEMS     REVIEW OF SYSTEMS  Review of Systems   Constitutional:  Negative for activity change, appetite change, chills, fatigue, fever and unexpected weight change.   HENT:  Negative for congestion, postnasal drip, rhinorrhea, sinus pressure, sinus pain, sneezing, sore throat, trouble swallowing and voice change.    Eyes:  Negative for redness and itching.   Respiratory:  Negative for cough, chest tightness, shortness of breath, wheezing and stridor.    Cardiovascular:  Negative for chest pain, palpitations and leg swelling.        Denies orthopnea   Gastrointestinal:  Negative for abdominal pain, diarrhea and nausea.        Denies acid reflux   Musculoskeletal:  Positive for arthralgias, back pain and myalgias. Negative for joint swelling.   Skin:  Negative for rash.   Allergic/Immunologic: Negative for environmental allergies and immunocompromised state.   Neurological:  Positive for dizziness. Negative for tremors, weakness and headaches.   Hematological:  Does not bruise/bleed easily.   Psychiatric/Behavioral:  Negative for agitation and sleep disturbance. The patient is nervous/anxious.         Claims depression   All other systems reviewed and are negative.    ALLERGIES AND MEDICATIONS      ALLERGIES  Allergies   Allergen Reactions    Amitriptyline Angioedema    Biotin Swelling    Clindamycin Unknown    Doxycycline Unknown    Latex Other    Metronidazole Diarrhea    Nortriptyline Unknown    Omeprazole Other     Tongue swelling    Sulfamethoxazole-Trimethoprim Unknown    Cephalexin Rash    Ciprofloxacin Rash    Magnesium Citrate Rash    Nitrofurantoin Monohyd/M-Cryst Rash    Penicillins Rash    Sulfa (Sulfonamide Antibiotics) Rash and Unknown       MEDICATIONS  Current Outpatient Medications   Medication Sig Dispense Refill    acetaminophen (Tylenol) 500 mg tablet Take 1 tablet (500 mg) by mouth. TAKE 1 TABLET EVERY 4 TO 6 HOURS AS NEEDED.      ammonium lactate (Lac-Hydrin) 12 % lotion Apply 1 Application topically early in the morning.. APPLY SPARINGLY ONCE DAILY TO ENTIRE BODY      atorvastatin (Lipitor) 20 mg tablet Take 1 tablet (20 mg) by mouth once daily. 90 tablet 2    brimonidine-timoloL (Combigan) 0.2-0.5 % ophthalmic solution       butalbital-acetaminophen-caff -40 mg tablet       cholecalciferol (Vitamin D-3) 25 MCG (1000 UT) capsule Take 1 capsule (25 mcg) by mouth once daily.      diclofenac sodium 1 % kit Apply topically.      dicyclomine (Bentyl) 20 mg tablet Take 1 tablet (20 mg) by mouth every 6 hours if needed.      eletriptan (Relpax) 40 mg tablet       famotidine (Pepcid) 20 mg tablet Take 1 tablet (20 mg) by mouth once daily as needed.      gabapentin (Neurontin) 300 mg capsule Take 1 capsule (300 mg) by mouth 2 times a day.      latanoprost (Xalatan) 0.005 % ophthalmic solution Administer into affected eye(s).      loratadine (Claritin) 10 mg tablet Take 1 tablet (10 mg) by mouth once daily.      Lumigan 0.01 % ophthalmic solution       meclizine (Antivert) 25 mg tablet Take 1 tablet (25 mg) by mouth 2 times a day as needed.      metFORMIN (Glucophage) 500 mg tablet TAKE 1 TABLET BY MOUTH ONCE  DAILY 100 tablet 2    polyethylene glycol (Glycolax, Miralax) packet Take 34 g  by mouth once daily.      propranolol XL (Innopran XL) 120 mg 24 hr capsule Take 1 capsule (120 mg) by mouth early in the morning..      psyllium (Metamucil, sugar,) powder Take by mouth.      Rocklatan 0.02-0.005 % drops       sodium chloride (Ayr) 0.65 % nasal drops Administer 1-2 drops into affected nostril(s). 1-2 sprays in each nostril 2-3 times/day as needed      lidocaine (LMX 4) 4 % cream Apply topically 4 times a day as needed for mild pain (1 - 3). (Patient not taking: Reported on 10/20/2023) 50 g 3     No current facility-administered medications for this visit.       PAST HISTORY     PAST MEDICAL HISTORY  She  has a past medical history of Body mass index (BMI) 24.0-24.9, adult (10/27/2021), Lumbago with sciatica, right side (01/08/2019), Other chest pain (03/13/2020), Other specified personal risk factors, not elsewhere classified (10/04/2022), Pain in left foot (04/13/2021), Pain in throat (10/25/2018), Personal history of other diseases of the digestive system (06/20/2022), Personal history of other diseases of the musculoskeletal system and connective tissue (10/20/2021), Personal history of other diseases of the respiratory system (04/06/2020), Personal history of other diseases of the respiratory system (03/24/2020), Preglaucoma, unspecified, unspecified eye (07/31/2013), Superficial foreign body, left foot, sequela (01/17/2022), and Xerosis cutis (06/26/2020).  Fibromyalgia  Osteoarthritis  Migraines  IBS  Anxiety  ITP  Glaucoma (legally blind in right eye)  HLD  Thyroid nodules    PAST SURGICAL HISTORY  Past Surgical History:   Procedure Laterality Date    ADENOIDECTOMY  07/31/2013    Adenoidectomy    COLONOSCOPY  07/31/2013    Complete Colonoscopy    EYE SURGERY  07/31/2013    Eye Surgery       IMMUNIZATION HISTORY  Immunization History   Administered Date(s) Administered    DTaP vaccine, pediatric  (INFANRIX) 04/13/2006    Flu vaccine (IIV4), preservative free *Check age/dose* 10/10/2023     "Influenza, injectable, quadrivalent 10/21/2019    Influenza, seasonal, injectable 03/24/2015, 10/30/2015, 10/24/2022    Pfizer COVID-19 vaccine, bivalent, age 12 years and older (30 mcg/0.3 mL) 09/28/2022    Pfizer Gray Cap SARS-CoV-2 06/03/2022    Pfizer Purple Cap SARS-CoV-2 04/27/2021, 05/18/2021, 01/05/2022       SOCIAL HISTORY  She  reports that she has never smoked. She has never been exposed to tobacco smoke. She has never used smokeless tobacco. She reports that she does not drink alcohol and does not use drugs.     OCCUPATIONAL/ENVIRONMENTAL HISTORY  Previously worked as: no known exposures  Currently works as: disability   DOES/DOES NOT: does not have known exposure to asbestos, silica, beryllium or inhaled metals.  DOES/DOES NOT: does not have exposure to birds or exotic animals.    FAMILY HISTORY  Family History   Problem Relation Name Age of Onset    Dementia Mother      Kidney failure Mother      Hypertension Mother      Diabetes Mother      Hypertension Father      Hyperlipidemia Father      Other (Open heart surgery) Father      Breast cancer Father      Hypertension Sister      Hyperlipidemia Sister      Breast cancer Sister      Breast cancer Father's Brother      Heart disease Maternal Grandfather      Breast cancer Paternal Grandmother       DOES/DOES NOT: does have a family history of pulmonary disease.  -Father; COPD (smoker)  DOES/DOES NOT: does have a family history of cancer.  DOES/DOES NOT: does not have a family history of autoimmune disorders.    PHYSICAL EXAM     VITAL SIGNS: /68   Pulse 67   Ht 1.499 m (4' 11\")   Wt 59 kg (130 lb)   LMP  (LMP Unknown)   SpO2 96%   BMI 26.26 kg/m²      PREVIOUS WEIGHTS:  Wt Readings from Last 3 Encounters:   10/20/23 59 kg (130 lb)   10/10/23 59.9 kg (132 lb)   08/15/23 59.4 kg (131 lb)       Physical Exam  Vitals reviewed.   Constitutional:       General: She is not in acute distress.     Appearance: Normal appearance. She is not " ill-appearing or toxic-appearing.   HENT:      Head: Normocephalic.      Nose: No rhinorrhea.   Cardiovascular:      Rate and Rhythm: Normal rate and regular rhythm.      Heart sounds: Normal heart sounds.   Pulmonary:      Effort: Pulmonary effort is normal. No respiratory distress.      Breath sounds: Normal breath sounds. No stridor.   Abdominal:      General: Abdomen is flat.   Musculoskeletal:         General: No swelling. Normal range of motion.   Skin:     General: Skin is warm and dry.      Nails: There is no clubbing.   Neurological:      General: No focal deficit present.      Mental Status: She is alert.   Psychiatric:         Mood and Affect: Mood normal.         Behavior: Behavior normal.         Judgment: Judgment normal.           RESULTS/DATA     Pulmonary Function Test Results     None on record    Chest Radiograph     XR chest 2 views     Narrative  Interpreted By:  LAVERN TENA MD  MRN: [id number]  Patient Name: KEESHA EPPERSONA    STUDY:  TH CHEST 2 VIEW PA AND LAT;  3/17/2023 1:04 pm    INDICATION:  atypical chest pain.    COMPARISON:  01/03/2023    ACCESSION NUMBER(S):  [id number]    ORDERING CLINICIAN:  CATALINA PRIEST    FINDINGS:  There is a faint nodular opacity measuring up to 6 mm in the right  midlung not clearly visualized on the prior study. No consolidation  or edema seen. There is no effusion.    Impression  6 mm faint nodular opacity over the right midlung. CT is advised for  further evaluation    COMMUNICATION  A critical result communication was sent to Dr. CATALINA PRIEST on  03/18/2023 at 9:10 a.m. using the [employer] system.      Chest CT Scan     CT angio chest for pulmonary embolism 08/17/2023    Narrative  Interpreted By:  BRIDGET GAXIOLA MD  MRN: [id number]  Patient Name: ZEENAT NEGRITO    STUDY:  CT ANGIO CHEST FOR PE;  8/17/2023 1:07 pm    INDICATION:  intermittent shortness of breath and pleuritic chest pain PT WT  125.6KG  LABS ?.    COMPARISON:  04/17/2023    ACCESSION  NUMBER(S):  [id number]    ORDERING CLINICIAN:  CATALINA PRIEST    TECHNIQUE:  Helical data acquisition of the chest was obtained after IV injection  of  50 ml of  Omnipaque 350. Images were reformatted in axial,  coronal, and sagittal planes. 3D reconstructed MIPS volumetric images  were also generated at an independent workstation and reviewed.    FINDINGS:  POTENTIAL LIMITATIONS OF THE STUDY: None    HEART AND VESSELS:  No discrete filling defects within the main pulmonary artery or its  branches.    The thoracic aorta is of normal course and caliber without aneurysm,  dissection or significant atherosclerotic calcification.    No coronary artery calcifications are seen. The study is not  optimized for evaluation of coronary arteries.    The cardiac chambers are not enlarged.    MEDIASTINUM AND DELORES, LOWER NECK AND AXILLA:  The visualized thyroid gland is within normal limits, the esophagus  appears unremarkable.    No evidence of thoracic lymphadenopathy by CT criteria.    LUNGS AND AIRWAYS:    Relatively small area of centrilobular and tree-in-bud nodularity at  the posterior segment of the right upper lobe has improved slightly  since the previous exam. This again is probably due to an  infectious/inflammatory process such as bronchiolitis. Given the  largest nodule measuring approximately 4 mm follow-up approximate 8  month would be recommended if there are risk factors for malignancy.  (Darin Mccrackenhokyle et al., Guidelines for management of incidental  pulmonary nodules detected on CT images: From the Fleischner Society  2017, Radiology. 2017 Jul;284 (1):228-243.) [email or web address].[email or web address].  The lungs otherwise are clear. There is no pleural effusion.    UPPER ABDOMEN AND OTHER FINDINGS:  There is a small gallbladder calculus.    CHEST WALL AND OSSEOUS STRUCTURES:  There are no suspicious osseous lesions.    Impression  1.  No evidence of pulmonary embolus, aneurysm or dissection.  2.  Persistent but slightly improved area  of centrilobular and  tree-in-bud nodularity in the right upper lobe as described.    4/17/23: IMPRESSION: 1. Focal area of tree-in-bud nodularity in the posterior segment of the right upper lobe with sequela of atypical infection (GAYLE/fungal). Consider short-term follow-up repeat CT chest in 6 months to document stability.  Echocardiogram & Cardiac Studies     No results found for this or any previous visit from the past 365 days.       Labwork & Pathology   Complete Blood Count  Lab Results   Component Value Date    WBC 6.5 06/21/2023    HGB 12.6 06/21/2023    HCT 36.4 06/21/2023    MCV 91 06/21/2023    PLT 84 (L) 06/21/2023       Peripheral Eosinophil Count:   Eosinophils Absolute   Date Value   06/21/2023 0.26 x10E9/L   11/27/2022 0.29 K/UL   09/15/2022 0.23 K/UL   06/09/2022 0.28 K/UL       Metabolic Parameters  Sodium   Date/Time Value Ref Range Status   11/27/2022 01:20  133 - 145 MMOL/L Final     Potassium   Date/Time Value Ref Range Status   11/27/2022 01:20 PM 4.7 3.4 - 5.1 MMOL/L Final     Chloride   Date/Time Value Ref Range Status   11/27/2022 01:20  97 - 107 MMOL/L Final     Bicarbonate   Date/Time Value Ref Range Status   11/27/2022 01:20 PM 27 24 - 31 MMOL/L Final     Anion Gap   Date/Time Value Ref Range Status   11/27/2022 01:20 PM 10 0 - 19 MMOL/L Final     Urea Nitrogen   Date/Time Value Ref Range Status   11/27/2022 01:20 PM 10 8 - 25 MG/DL Final     Creatinine   Date/Time Value Ref Range Status   11/27/2022 01:20 PM 0.7 0.4 - 1.6 MG/DL Final     Glucose   Date/Time Value Ref Range Status   11/27/2022 01:20  (H) 65 - 99 MG/DL Final     Calcium   Date/Time Value Ref Range Status   11/27/2022 01:20 PM 9.7 8.5 - 10.4 MG/DL Final     AST   Date/Time Value Ref Range Status   11/27/2022 01:20 PM 24 5 - 40 U/L Final     ALT (SGPT)   Date/Time Value Ref Range Status   11/27/2022 01:20 PM 45 (H) 5 - 40 U/L Final       Coagulation Parameters  Protime   Date/Time Value Ref Range Status  "  02/10/2020 12:07 PM 11.7 9.7 - 12.7 sec Final     INR   Date/Time Value Ref Range Status   02/10/2020 12:07 PM 1.1 0.9 - 1.1 Final       Serum Immunoglobulin E:    No results found for: \"IGE\"     Bronchoscopy & Sputum Cultures       ASSESSMENT/PLAN     Ms. Chapa is a 58 y.o. female; was referred to the [facility] for evaluation of Abnormal CT Scan    Problem List and Orders  Diagnoses and all orders for this visit:  Abnormal CT of the chest  -     CT chest wo IV contrast; Future  Suspected sleep apnea  -     Referral to Adult Sleep Medicine; Future  Gastroesophageal reflux disease, unspecified whether esophagitis present  Dysphagia, unspecified type  -     FL modified barium swallow study; Future      Assessment and Plan / Recommendations:  Abnormal Chest CT: CT chest on 4/17/2023 which showed focal area of tree-in-bud nodularity in the posterior segment of the right upper lobe with sequela of atypical infection (?  GAYLE/fungal).  A repeat CT chest was then completed 8/17/2023.  Finding showed persistent but slightly improved area of centrilobular and tree-in-bud nodularity in the right upper lobe.  I personally reviewed these images.  The 4 mm nodule in the right upper lobe is partially calcified and unchanged from the prior scan; the tree-in-bud inflammatory findings do appear slightly better from the previous scan; overall, not anything too overwhelming from my standpoint.  - Given patients other symptoms or ? aspiration and dysphagia long with her persistent GERD/belching (also ?silent aspiration); I'm wondering if the inflammatory tree in bud findings could be in relation to this.  - She does not have any pulmonary symptoms that would suggest infection/atypical infection  - Ordering repeat CT scan 6 months from prior (due mid February 2024)  - Advised close follow up with GI  - Ordering MBS to rule out aspiration with eating/drinking    2. Suspected Sleep Apnea: admits to " snoring, witnessed abnormal breathing patters, waking up choking/gasping, daytime fatigue and morning headaches. Has never done a sleep study before.  - Referral to sleep medicine    RTC after repeat Chest CT

## 2023-10-20 ENCOUNTER — ANCILLARY PROCEDURE (OUTPATIENT)
Dept: RADIOLOGY | Facility: CLINIC | Age: 58
End: 2023-10-20
Payer: COMMERCIAL

## 2023-10-20 ENCOUNTER — OFFICE VISIT (OUTPATIENT)
Dept: PULMONOLOGY | Facility: CLINIC | Age: 58
End: 2023-10-20
Payer: COMMERCIAL

## 2023-10-20 ENCOUNTER — APPOINTMENT (OUTPATIENT)
Dept: RADIOLOGY | Facility: CLINIC | Age: 58
End: 2023-10-20
Payer: COMMERCIAL

## 2023-10-20 VITALS
OXYGEN SATURATION: 96 % | WEIGHT: 130 LBS | HEART RATE: 67 BPM | SYSTOLIC BLOOD PRESSURE: 100 MMHG | BODY MASS INDEX: 26.21 KG/M2 | HEIGHT: 59 IN | DIASTOLIC BLOOD PRESSURE: 68 MMHG

## 2023-10-20 DIAGNOSIS — R13.10 DYSPHAGIA, UNSPECIFIED TYPE: ICD-10-CM

## 2023-10-20 DIAGNOSIS — R93.89 ABNORMAL CT OF THE CHEST: Primary | ICD-10-CM

## 2023-10-20 DIAGNOSIS — R29.818 SUSPECTED SLEEP APNEA: ICD-10-CM

## 2023-10-20 DIAGNOSIS — K21.9 GASTROESOPHAGEAL REFLUX DISEASE, UNSPECIFIED WHETHER ESOPHAGITIS PRESENT: ICD-10-CM

## 2023-10-20 DIAGNOSIS — E04.2 NONTOXIC MULTINODULAR GOITER: ICD-10-CM

## 2023-10-20 PROCEDURE — 76536 US EXAM OF HEAD AND NECK: CPT | Performed by: RADIOLOGY

## 2023-10-20 PROCEDURE — 99214 OFFICE O/P EST MOD 30 MIN: CPT | Performed by: NURSE PRACTITIONER

## 2023-10-20 PROCEDURE — 76536 US EXAM OF HEAD AND NECK: CPT

## 2023-10-20 PROCEDURE — 1036F TOBACCO NON-USER: CPT | Performed by: NURSE PRACTITIONER

## 2023-10-20 RX ORDER — VIT C/E/ZN/COPPR/LUTEIN/ZEAXAN 250MG-90MG
25 CAPSULE ORAL DAILY
COMMUNITY

## 2023-10-20 RX ORDER — POLYETHYLENE GLYCOL 3350 17 G/17G
34 POWDER, FOR SOLUTION ORAL DAILY
COMMUNITY

## 2023-10-20 ASSESSMENT — ENCOUNTER SYMPTOMS
APPETITE CHANGE: 0
JOINT SWELLING: 0
EYE ITCHING: 0
RHINORRHEA: 0
TROUBLE SWALLOWING: 0
MYALGIAS: 1
BACK PAIN: 1
EYE REDNESS: 0
BRUISES/BLEEDS EASILY: 0
ACTIVITY CHANGE: 0
CHILLS: 0
DIARRHEA: 0
AGITATION: 0
WEAKNESS: 0
DIZZINESS: 1
CHEST TIGHTNESS: 0
SINUS PRESSURE: 0
STRIDOR: 0
VOICE CHANGE: 0
FEVER: 0
ARTHRALGIAS: 1
FATIGUE: 0
NAUSEA: 0
PALPITATIONS: 0
TREMORS: 0
NERVOUS/ANXIOUS: 1
SLEEP DISTURBANCE: 0
ABDOMINAL PAIN: 0
SHORTNESS OF BREATH: 0
COUGH: 0
UNEXPECTED WEIGHT CHANGE: 0
ROS GI COMMENTS: DENIES ACID REFLUX
SORE THROAT: 0
HEADACHES: 0
SINUS PAIN: 0
WHEEZING: 0

## 2023-10-20 ASSESSMENT — LIFESTYLE VARIABLES: HOW MANY STANDARD DRINKS CONTAINING ALCOHOL DO YOU HAVE ON A TYPICAL DAY: PATIENT DOES NOT DRINK

## 2023-10-20 ASSESSMENT — PATIENT HEALTH QUESTIONNAIRE - PHQ9
1. LITTLE INTEREST OR PLEASURE IN DOING THINGS: NOT AT ALL
SUM OF ALL RESPONSES TO PHQ9 QUESTIONS 1 & 2: 0
2. FEELING DOWN, DEPRESSED OR HOPELESS: NOT AT ALL

## 2023-10-20 ASSESSMENT — PAIN SCALES - GENERAL: PAINLEVEL: 5

## 2023-10-20 NOTE — PATIENT INSTRUCTIONS
Today we discussed your pulmonary history and recent CT scans.    - Ordering repeat Chest CT in 6 months from your last scan (Due 2/2024)  - Ordering swallowing study  - Referral to sleep medicine for suspected sleep apnea  - Continue to closely follow with your GI doctor    Thank you for visiting the pulmonary clinic today! It was a pleasure to participate in your care.  Please return to clinic after repeat Chest CT (late February 2024) or sooner if needed.    Abrahan Healy, CNP  My Office Number: [phone]   CT Scheduling: [phone]  PFT/Follow Up Visit Scheduling: [phone]  My Nurse: Darcy Hunt  My : Helen

## 2023-10-23 NOTE — PROGRESS NOTES
[facility]: CB Menopausal Symptoms 1     HPI:          Menopausal Symptoms           Hot flashes  none.              Night Sweats  none.              Vaginal dryness  no.              History of Cancer  no.              History of Hysterectomy  no.              History of CVA, MI, or Clotting Disorders  no.          ROS:       [facility] - GENERAL          Loss of Sleep  no.  Sweats  no.        [facility] - WOMEN ONLY          Hot Flashes  no.  Bleeding since LMP  No.        [facility] - GENITO-URINARY          Frequent Urination  no.  Lack of Bladder Control  no.          Examination:          Constitutional/General          WELL DEVELOPED normal.           WELL NOURISHED normal.           BODY HABITUS normal.           FUNCTIONAL HANDICAP none.           WELL GROOMED normal.        Gynecologic Exam          BREASTS Normal.           ABDOMEN normal, nontender.           EXT. GENITALS normal .           URETHRA/MEATUS normal.           URETHRA normal.           BLADDER normal.           VAGINA normal.           PELVIC SUPPORT normal.           CERVIX normal.           UTERUS normal.           ADNEXA normal.           ANUS/PERINEUM normal.           RECTAL normal, heme negative.        Lymphatic-Palpate Nodes          AXILLA normal.           GROIN normal.        Neurologic/Psychiatric          ORIENTED X 3 normal.           MOOD/AFFECT normal .        Assessment/Plan   Diagnoses and all orders for this visit:  Encounter for gynecological examination without abnormal finding  Screening mammogram for breast cancer

## 2023-10-24 ENCOUNTER — OFFICE VISIT (OUTPATIENT)
Dept: OBSTETRICS AND GYNECOLOGY | Facility: CLINIC | Age: 58
End: 2023-10-24
Payer: COMMERCIAL

## 2023-10-24 VITALS
SYSTOLIC BLOOD PRESSURE: 118 MMHG | BODY MASS INDEX: 24.55 KG/M2 | WEIGHT: 130 LBS | DIASTOLIC BLOOD PRESSURE: 60 MMHG | HEIGHT: 61 IN

## 2023-10-24 DIAGNOSIS — Z12.31 SCREENING MAMMOGRAM FOR BREAST CANCER: ICD-10-CM

## 2023-10-24 DIAGNOSIS — Z01.419 ENCOUNTER FOR GYNECOLOGICAL EXAMINATION WITHOUT ABNORMAL FINDING: Primary | ICD-10-CM

## 2023-10-24 PROCEDURE — 99214 OFFICE O/P EST MOD 30 MIN: CPT | Performed by: OBSTETRICS & GYNECOLOGY

## 2023-10-24 PROCEDURE — 88175 CYTOPATH C/V AUTO FLUID REDO: CPT | Mod: TC | Performed by: OBSTETRICS & GYNECOLOGY

## 2023-10-24 PROCEDURE — 1036F TOBACCO NON-USER: CPT | Performed by: OBSTETRICS & GYNECOLOGY

## 2023-10-24 PROCEDURE — 99214 OFFICE O/P EST MOD 30 MIN: CPT | Mod: 25 | Performed by: OBSTETRICS & GYNECOLOGY

## 2023-10-24 ASSESSMENT — PAIN SCALES - GENERAL: PAINLEVEL: 2

## 2023-10-24 ASSESSMENT — LIFESTYLE VARIABLES
AUDIT-C TOTAL SCORE: 0
HOW MANY STANDARD DRINKS CONTAINING ALCOHOL DO YOU HAVE ON A TYPICAL DAY: PATIENT DOES NOT DRINK
HOW OFTEN DO YOU HAVE SIX OR MORE DRINKS ON ONE OCCASION: NEVER
HOW OFTEN DO YOU HAVE A DRINK CONTAINING ALCOHOL: NEVER
SKIP TO QUESTIONS 9-10: 1

## 2023-10-24 ASSESSMENT — PATIENT HEALTH QUESTIONNAIRE - PHQ9
2. FEELING DOWN, DEPRESSED OR HOPELESS: NOT AT ALL
SUM OF ALL RESPONSES TO PHQ9 QUESTIONS 1 & 2: 0
1. LITTLE INTEREST OR PLEASURE IN DOING THINGS: NOT AT ALL

## 2023-10-27 ENCOUNTER — APPOINTMENT (OUTPATIENT)
Dept: PRIMARY CARE | Facility: CLINIC | Age: 58
End: 2023-10-27
Payer: COMMERCIAL

## 2023-10-31 ENCOUNTER — APPOINTMENT (OUTPATIENT)
Dept: RADIOLOGY | Facility: CLINIC | Age: 58
End: 2023-10-31
Payer: COMMERCIAL

## 2023-11-08 LAB
CYTOLOGY CMNT CVX/VAG CYTO-IMP: NORMAL
LAB AP HPV GENOTYPE QUESTION: YES
LAB AP HPV HR: NORMAL
LABORATORY COMMENT REPORT: NORMAL
PATH REPORT.TOTAL CANCER: NORMAL

## 2023-11-09 ENCOUNTER — APPOINTMENT (OUTPATIENT)
Dept: RADIOLOGY | Facility: HOSPITAL | Age: 58
End: 2023-11-09
Payer: COMMERCIAL

## 2023-11-13 ENCOUNTER — TELEPHONE (OUTPATIENT)
Dept: PRIMARY CARE | Facility: CLINIC | Age: 58
End: 2023-11-13

## 2023-11-13 ENCOUNTER — OFFICE VISIT (OUTPATIENT)
Dept: PRIMARY CARE | Facility: CLINIC | Age: 58
End: 2023-11-13
Payer: COMMERCIAL

## 2023-11-13 VITALS
BODY MASS INDEX: 24.9 KG/M2 | TEMPERATURE: 97.7 F | WEIGHT: 131.8 LBS | DIASTOLIC BLOOD PRESSURE: 71 MMHG | HEART RATE: 63 BPM | SYSTOLIC BLOOD PRESSURE: 146 MMHG

## 2023-11-13 DIAGNOSIS — T14.8XXA INFECTED WOUND: Primary | ICD-10-CM

## 2023-11-13 DIAGNOSIS — Z88.9 MULTIPLE DRUG ALLERGIES: ICD-10-CM

## 2023-11-13 DIAGNOSIS — L03.115 CELLULITIS OF RIGHT LOWER EXTREMITY: ICD-10-CM

## 2023-11-13 DIAGNOSIS — L08.9 INFECTED WOUND: Primary | ICD-10-CM

## 2023-11-13 PROCEDURE — 99213 OFFICE O/P EST LOW 20 MIN: CPT | Performed by: PEDIATRICS

## 2023-11-13 PROCEDURE — 1036F TOBACCO NON-USER: CPT | Performed by: PEDIATRICS

## 2023-11-13 RX ORDER — ERYTHROMYCIN ETHYLSUCCINATE 400 MG/1
400 TABLET ORAL 3 TIMES DAILY
Qty: 21 TABLET | Refills: 0 | Status: SHIPPED | OUTPATIENT
Start: 2023-11-13 | End: 2023-11-14 | Stop reason: SDUPTHER

## 2023-11-13 NOTE — PROGRESS NOTES
Subjective   Patient ID: Mady Chapa is a 58 y.o. female who presents for No chief complaint on file..    HPI     Cut foot 1-2 weeks ago, stubbed it on heater. Now swollen and painful. Some redness. No fevers. No drainage noticed.   Using neosporin and bandaid  Review of Systems    Objective   /71   Pulse 63   Temp 36.5 °C (97.7 °F)   Wt 59.8 kg (131 lb 12.8 oz)   BMI 24.90 kg/m²     Physical Exam  Constitutional:       Appearance: Normal appearance.   Cardiovascular:      Pulses:           Dorsalis pedis pulses are 2+ on the left side.   Skin:     Comments: Small wound to left great toe with surrounding redness   Neurological:      Mental Status: She is alert.         Assessment/Plan   Problem List Items Addressed This Visit             ICD-10-CM    RESOLVED: Infected wound - Primary T14.8XXA, L08.9    Cellulitis of right lower extremity L03.115    Relevant Medications    erythromycin ethylsuccinate (E.E.S. 400) 400 mg tablet

## 2023-11-14 ENCOUNTER — TELEPHONE (OUTPATIENT)
Dept: PRIMARY CARE | Facility: CLINIC | Age: 58
End: 2023-11-14

## 2023-11-14 DIAGNOSIS — L03.115 CELLULITIS OF RIGHT LOWER EXTREMITY: ICD-10-CM

## 2023-11-14 RX ORDER — ERYTHROMYCIN ETHYLSUCCINATE 400 MG/1
400 TABLET ORAL 3 TIMES DAILY
Qty: 21 TABLET | Refills: 0 | Status: SHIPPED | OUTPATIENT
Start: 2023-11-14 | End: 2023-11-14 | Stop reason: SDUPTHER

## 2023-11-14 RX ORDER — ERYTHROMYCIN ETHYLSUCCINATE 400 MG/1
400 TABLET ORAL 3 TIMES DAILY
Qty: 21 TABLET | Refills: 0 | OUTPATIENT
Start: 2023-11-14

## 2023-11-14 RX ORDER — ERYTHROMYCIN ETHYLSUCCINATE 400 MG/1
400 TABLET ORAL 3 TIMES DAILY
Qty: 21 TABLET | Refills: 0 | Status: SHIPPED | OUTPATIENT
Start: 2023-11-14 | End: 2024-03-13 | Stop reason: ALTCHOICE

## 2023-11-15 DIAGNOSIS — L03.115 CELLULITIS OF RIGHT LOWER EXTREMITY: Primary | ICD-10-CM

## 2023-11-15 RX ORDER — ERYTHROMYCIN 500 MG/1
500 TABLET, COATED ORAL 3 TIMES DAILY
Qty: 21 TABLET | Refills: 0 | Status: SHIPPED | OUTPATIENT
Start: 2023-11-15 | End: 2023-11-22

## 2023-11-15 NOTE — TELEPHONE ENCOUNTER
Ms. Chapa called office today regarding medication erythromycin ethylsuccinate (E.E.S. 400) 400 mg tablet as she stated not at [facility] pharmacy and she wanted it to be called to [facility] in [place], [phone] before 12:00pm so that it could be delivered to her.  I advised her to please only leave 1 message and let the pharmacy advise if medication is not in stock or will not be delivered.  Also I advised her that [facility] doesn't have the medication that is being ordered by Dr. Pike.  Ms. Chapa stated another medication and I advised this medication is not the medication Dr. Pike has ordered for you.  Ms. Chapa expressed clear understanding.    Dr. Pike please advise if theres something else you want to prescribe if pharmacy unable to get medication in from manufacture.

## 2023-11-17 DIAGNOSIS — E78.00 HIGH BLOOD CHOLESTEROL: ICD-10-CM

## 2023-11-21 DIAGNOSIS — R11.0 NAUSEA: Primary | ICD-10-CM

## 2023-11-21 RX ORDER — ATORVASTATIN CALCIUM 20 MG/1
20 TABLET, FILM COATED ORAL DAILY
Qty: 100 TABLET | Refills: 2 | Status: SHIPPED | OUTPATIENT
Start: 2023-11-21 | End: 2024-06-06

## 2023-11-21 RX ORDER — PROCHLORPERAZINE MALEATE 5 MG
5 TABLET ORAL EVERY 6 HOURS PRN
Qty: 30 TABLET | Refills: 0 | Status: SHIPPED | OUTPATIENT
Start: 2023-11-21 | End: 2023-11-28

## 2023-12-11 ENCOUNTER — APPOINTMENT (OUTPATIENT)
Dept: SLEEP MEDICINE | Facility: CLINIC | Age: 58
End: 2023-12-11
Payer: COMMERCIAL

## 2023-12-17 RX ORDER — BUTALBITAL, ACETAMINOPHEN AND CAFFEINE 50; 325; 40 MG/1; MG/1; MG/1
TABLET ORAL
Qty: 180 TABLET | OUTPATIENT
Start: 2023-12-17

## 2023-12-20 ENCOUNTER — APPOINTMENT (OUTPATIENT)
Dept: SLEEP MEDICINE | Facility: CLINIC | Age: 58
End: 2023-12-20
Payer: COMMERCIAL

## 2024-01-05 ENCOUNTER — APPOINTMENT (OUTPATIENT)
Dept: OTOLARYNGOLOGY | Facility: CLINIC | Age: 59
End: 2024-01-05
Payer: COMMERCIAL

## 2024-01-08 ENCOUNTER — APPOINTMENT (OUTPATIENT)
Dept: RADIOLOGY | Facility: HOSPITAL | Age: 59
End: 2024-01-08
Payer: COMMERCIAL

## 2024-01-09 ENCOUNTER — APPOINTMENT (OUTPATIENT)
Dept: RADIOLOGY | Facility: HOSPITAL | Age: 59
End: 2024-01-09
Payer: COMMERCIAL

## 2024-01-17 ENCOUNTER — APPOINTMENT (OUTPATIENT)
Dept: SLEEP MEDICINE | Facility: CLINIC | Age: 59
End: 2024-01-17
Payer: COMMERCIAL

## 2024-01-18 ENCOUNTER — TELEPHONE (OUTPATIENT)
Dept: PRIMARY CARE | Facility: CLINIC | Age: 59
End: 2024-01-18
Payer: COMMERCIAL

## 2024-01-26 ENCOUNTER — APPOINTMENT (OUTPATIENT)
Dept: OTOLARYNGOLOGY | Facility: CLINIC | Age: 59
End: 2024-01-26
Payer: COMMERCIAL

## 2024-01-29 ENCOUNTER — APPOINTMENT (OUTPATIENT)
Dept: RADIOLOGY | Facility: HOSPITAL | Age: 59
End: 2024-01-29
Payer: COMMERCIAL

## 2024-01-30 ENCOUNTER — APPOINTMENT (OUTPATIENT)
Dept: RADIOLOGY | Facility: HOSPITAL | Age: 59
End: 2024-01-30
Payer: COMMERCIAL

## 2024-02-04 DIAGNOSIS — R93.89 ABNORMAL CT OF THE CHEST: Primary | ICD-10-CM

## 2024-02-08 ENCOUNTER — APPOINTMENT (OUTPATIENT)
Dept: SLEEP MEDICINE | Facility: CLINIC | Age: 59
End: 2024-02-08
Payer: COMMERCIAL

## 2024-02-08 ENCOUNTER — APPOINTMENT (OUTPATIENT)
Dept: RADIOLOGY | Facility: HOSPITAL | Age: 59
End: 2024-02-08
Payer: COMMERCIAL

## 2024-02-16 ENCOUNTER — APPOINTMENT (OUTPATIENT)
Dept: RADIOLOGY | Facility: HOSPITAL | Age: 59
End: 2024-02-16
Payer: COMMERCIAL

## 2024-02-19 ENCOUNTER — APPOINTMENT (OUTPATIENT)
Dept: RADIOLOGY | Facility: HOSPITAL | Age: 59
End: 2024-02-19
Payer: COMMERCIAL

## 2024-02-21 ENCOUNTER — APPOINTMENT (OUTPATIENT)
Dept: PRIMARY CARE | Facility: CLINIC | Age: 59
End: 2024-02-21
Payer: COMMERCIAL

## 2024-02-23 ENCOUNTER — APPOINTMENT (OUTPATIENT)
Dept: AUDIOLOGY | Facility: CLINIC | Age: 59
End: 2024-02-23
Payer: COMMERCIAL

## 2024-02-23 ENCOUNTER — APPOINTMENT (OUTPATIENT)
Dept: OTOLARYNGOLOGY | Facility: CLINIC | Age: 59
End: 2024-02-23
Payer: COMMERCIAL

## 2024-03-01 ENCOUNTER — APPOINTMENT (OUTPATIENT)
Dept: PULMONOLOGY | Facility: CLINIC | Age: 59
End: 2024-03-01
Payer: COMMERCIAL

## 2024-03-05 ENCOUNTER — APPOINTMENT (OUTPATIENT)
Dept: RADIOLOGY | Facility: HOSPITAL | Age: 59
End: 2024-03-05
Payer: COMMERCIAL

## 2024-03-06 ENCOUNTER — TELEPHONE (OUTPATIENT)
Dept: PRIMARY CARE | Facility: CLINIC | Age: 59
End: 2024-03-06
Payer: COMMERCIAL

## 2024-03-06 NOTE — TELEPHONE ENCOUNTER
Pt would like a call in regards to joining a nursing home with her boyfriend. Can you please contact pt.

## 2024-03-08 ENCOUNTER — TELEPHONE (OUTPATIENT)
Dept: PRIMARY CARE | Facility: CLINIC | Age: 59
End: 2024-03-08
Payer: COMMERCIAL

## 2024-03-11 ENCOUNTER — APPOINTMENT (OUTPATIENT)
Dept: PRIMARY CARE | Facility: CLINIC | Age: 59
End: 2024-03-11
Payer: COMMERCIAL

## 2024-03-13 ENCOUNTER — OFFICE VISIT (OUTPATIENT)
Dept: SLEEP MEDICINE | Facility: CLINIC | Age: 59
End: 2024-03-13
Payer: COMMERCIAL

## 2024-03-13 VITALS — BODY MASS INDEX: 24.31 KG/M2 | HEIGHT: 59 IN | WEIGHT: 120.6 LBS

## 2024-03-13 DIAGNOSIS — Z72.821 INADEQUATE SLEEP HYGIENE: ICD-10-CM

## 2024-03-13 DIAGNOSIS — G47.10 HYPERSOMNIA: ICD-10-CM

## 2024-03-13 DIAGNOSIS — G47.30 SLEEP-DISORDERED BREATHING: Primary | ICD-10-CM

## 2024-03-13 DIAGNOSIS — G47.9 SLEEP DISTURBANCE: ICD-10-CM

## 2024-03-13 DIAGNOSIS — Z78.9 NEVER SMOKED TOBACCO: ICD-10-CM

## 2024-03-13 DIAGNOSIS — R06.83 SNORING: ICD-10-CM

## 2024-03-13 PROCEDURE — 1036F TOBACCO NON-USER: CPT

## 2024-03-13 PROCEDURE — 3008F BODY MASS INDEX DOCD: CPT

## 2024-03-13 PROCEDURE — 99204 OFFICE O/P NEW MOD 45 MIN: CPT

## 2024-03-13 NOTE — LETTER
Your Provider has ordered you a sleep study.  The process for a home sleep study is as follows:    HOME SLEEP STUDY    Your test was ordered today. It will usually take 2 - 3 business days for Insurance to approve the order.     Once you test is approved it will be sent to the ordering sleep lab. When the sleep lab is notified of the new order, they will reach out to you to get you scheduled for a pick-up date for your Home Sleep Study Kit or notify you of when it will be mailed (CLEVMED).     They usually will reach out to you about 1 week after your test is ordered. Please contact the office at [phone] if you have not heard back from them in 2 weeks after you have seen your provider. See below for list of sleep lab contact numbers, if needed.     Sleep Lab Contact Information:   Main Phone Line (scheduling only): [phone]), option 3  Adult and Pediatric Locations  [facility] (6 years and older): [facility] - 4th floor ([facility]) After hours line: [phone]  [facility] (Main campus: All ages): [facility], 6th floor. After hours line: [phone]   [place] (5 years and older; younger considered on case-by-case basis): [address]; [address], [address]. Scheduling  After hours line: [phone]   [place] (6 years and older): [address]; [address]; [address]   [place] (6 years and older): [address] [facility] [address]  After hours line: [phone]   [place] (13 years and older) in [place]: [address], 2nd floor  After hours line: [phone]   [place] (13 year and older): [address]  After hours line: [phone]     Adult Only Locations:   [place] (18 years and older): [address] [facility], 2nd [facility]   [place] (18 years and older): [address] [facility]; 4th floor  After hours line: [phone]  [facility] (18 years and older) at  [place]: [address]  After hours line: [phone]

## 2024-03-13 NOTE — LETTER
2024     Sil Pike MD  [address]  [address]  [facility] [place] [facility] [address]    Patient: Mady Chapa   YOB: 1965   Date of Visit: 3/13/2024       Dear Dr. Sil Pike MD:    Thank you for referring Mady Chapa to me for evaluation. Below are my notes for this consultation.  If you have questions, please do not hesitate to call me. I look forward to following your patient along with you.       Sincerely,     HALLIE Faith      CC: No Recipients  ______________________________________________________________________________________     Patient: Mady Chapa  : 1965 AGE: 58 y.o. SEX:female   MRN: [id number]   Provider: HALLIE Faith     Location [facility]   Service Date: 3/13/2024     PCP: Sil Pike MD   Referred by: Abrahan Healy PA-C            [facility] [facility]  NEW PATIENT VISIT NOTE      Virtual or Telephone Consent  An interactive audio and video telecommunication system which permits real time communications between the patient (at the originating site) and provider (at the distant site) was utilized to provide this telehealth service.   Verbal consent was requested and obtained from Mady Chapa on this date, 24 for a telehealth visit.       PATIENT INFORMATION     The patient's referring provider is: Abrahan Healy PA-C  The patient's Primary Care Provider: Sil Pike MD    HISTORY OF PRESENT ILLNESS     Patient ID: Mady Chapa is a 58 y.o. female who presents to a [facility] [place] [facility] for evaluation for evaluation for sleep apnea    Patient is here alone today.  The patient has pertinent hx of sleep disordered breathing, sleep disturbance, snoring, inadequate sleep hygiene, EDS, fatigue, HLD, thrombocytopenia, pre-diabetes, seasonal allergies, GERD, IBS, eczema, depression, anxiety, vitamin D deficiency, glaucoma, OAB, Rheumatoid arthritis, migraines,  Fibromyalgia, OA, back pain, peripheral neuropathy, and chronic pain.     03/13/24  Patient here today for evaluation for ZAIN. She reports that she had an abnormal ct-scan. She was told that she may have sleep apnea. Her boyfriend tells her she snores. She does reports sleep disturbances at night, difficulty falling asleep, EDS, fatigue, unrefreshing sleep, nocturia, headaches in the morning, feeling irritable during the day, poor concentration and lack of energy. She has never had a sleep study in the past. She has been working on weight loss, she is down to 120 lb from 175 lb over this past year. She does take Gabapentin and propranolol at night for pain and anxiety management which helps with sleep.   I discussed with her testing options, she does have Medicare and Medicaid Insurance. I will start with HSAT, but patient was made aware that it may not be covered and will need to be switched to in-lab. She verbalized understanding and agreeable to plan.       SLEEP HISTORY       SLEEP-WAKE SCHEDULE  Bedtime:  10 - 11 pm daily  Subjective sleep latency: 15 - 60 mins  Difficulty falling asleep: Yes  due to mind-racing / rumination / worry  Number of awakenings: x1-2 times per night   Falls back asleep in 5 - 15 mins  Difficulty staying asleep: No  due to nocturia  Final wake time:  6 - 8 am daily  Out of bed time:  6 - 8 am daily  Shift work: on disability  Naps: rarely  Feels rested after a nap: N/A  Average sleep duration (excluding naps): 6 - 7 hrs    SLEEP ENVIRONMENT  Sleep location: bed  Sleep status: sleeps with boyfriend  Preferred sleep position: side  TV in bedroom: no  Room is dark:  Yes  Room is quiet: Yes  Room is cool: Yes  Bed comfort: good    SLEEP HABITS   Activities before bedtime: TV, phone  Activities in bed: phone  Clockwatching: No   Smoking: never  ETOH: denied  Marijuana: denied  Caffeine: coffee throughout the day  Sleep aids: denied    WEIGHT: lost ~50 lbs over 1 year     Claustrophobia: No      STOP-BAN        REVIEW OF SYSTEMS     REVIEW OF SYSTEMS  Sleep-related ROS:  Night symptoms: POSITIVE for snoring, witnessed apnea, wake up gasping and/or choking for air, nasal congestion , and nocturia  Morning symptoms: POSITIVE for unrefreshing sleep, morning headache, and morning dry mouth  Daytime symptoms POSITIVE for excessive daytime sleepiness, fatigue, trouble staying focused in daytime, and irritability in daytime  Hypersomnia / narcolepsy symptoms: POSITIVE for disturbed nocturnal sleep  Parasomnia symptoms: Patient denies symptoms of parasomnia.  Movements in sleep: Patient denies problematic movements in sleep,     RLS screen:  RLSSCREEN: - Sensations: Patient does not have unusual sensations in their extremities that cause an urge to move them     Naps:   No.   Fatigue: symptoms bothersome, but easily able to carry out all usual work/school/family activities    All other systems have been reviewed and are negative.      ALLERGIES AND MEDICATIONS     ALLERGIES  Allergies   Allergen Reactions   • Amitriptyline Angioedema   • Biotin Swelling   • Clindamycin Unknown   • Doxycycline Unknown   • Latex Other   • Metronidazole Diarrhea   • Nortriptyline Unknown   • Omeprazole Other     Tongue swelling   • Sulfamethoxazole-Trimethoprim Unknown   • Cephalexin Rash   • Ciprofloxacin Rash   • Magnesium Citrate Rash   • Nitrofurantoin Monohyd/M-Cryst Rash   • Penicillins Rash   • Sulfa (Sulfonamide Antibiotics) Rash and Unknown       MEDICATIONS  Current Outpatient Medications   Medication Sig Dispense Refill   • acetaminophen (Tylenol) 500 mg tablet Take 1 tablet (500 mg) by mouth. TAKE 1 TABLET EVERY 4 TO 6 HOURS AS NEEDED.     • ammonium lactate (Lac-Hydrin) 12 % lotion Apply 1 Application topically early in the morning.. APPLY SPARINGLY ONCE DAILY TO ENTIRE BODY     • atorvastatin (Lipitor) 20 mg tablet TAKE 1 TABLET BY MOUTH ONCE  DAILY 100 tablet 2   • brimonidine-timoloL (Combigan) 0.2-0.5 %  ophthalmic solution      • butalbital-acetaminophen-caff -40 mg tablet      • cholecalciferol (Vitamin D-3) 25 MCG (1000 UT) capsule Take 1 capsule (25 mcg) by mouth once daily.     • diclofenac sodium 1 % kit Apply topically.     • dicyclomine (Bentyl) 20 mg tablet Take 1 tablet (20 mg) by mouth every 6 hours if needed.     • eletriptan (Relpax) 40 mg tablet      • erythromycin ethylsuccinate (E.E.S. 400) 400 mg tablet Take 1 tablet (400 mg) by mouth 3 times a day. 21 tablet 0   • famotidine (Pepcid) 20 mg tablet Take 1 tablet (20 mg) by mouth once daily as needed.     • gabapentin (Neurontin) 300 mg capsule Take 1 capsule (300 mg) by mouth 2 times a day.     • latanoprost (Xalatan) 0.005 % ophthalmic solution Administer into affected eye(s).     • loratadine (Claritin) 10 mg tablet Take 1 tablet (10 mg) by mouth once daily.     • Lumigan 0.01 % ophthalmic solution      • meclizine (Antivert) 25 mg tablet Take 1 tablet (25 mg) by mouth 2 times a day as needed.     • metFORMIN (Glucophage) 500 mg tablet TAKE 1 TABLET BY MOUTH ONCE  DAILY 100 tablet 2   • polyethylene glycol (Glycolax, Miralax) packet Take 34 g by mouth once daily.     • propranolol XL (Innopran XL) 120 mg 24 hr capsule Take 1 capsule (120 mg) by mouth early in the morning..     • psyllium (Metamucil, sugar,) powder Take by mouth.     • Rocklatan 0.02-0.005 % drops      • sodium chloride (Ayr) 0.65 % nasal drops Administer 1-2 drops into affected nostril(s). 1-2 sprays in each nostril 2-3 times/day as needed       No current facility-administered medications for this visit.         PAST HISTORY     PERTINENT PAST MEDICAL HISTORY: See HPI    PERTINENT PAST SURGICAL HISTORY for Sleep Medicine:  tonsillectomy    PERTINENT FAMILY HISTORY for Sleep Medicine:  sleep apnea- father    PERTINENT SOCIAL HISTORY:  She  reports that she has never smoked. She has never been exposed to tobacco smoke. She has never used smokeless tobacco. She reports that she  "does not drink alcohol and does not use drugs. She currently lives with partner and on disability    Active Problems, Allergy List, Medication List, and PMH/PSH/FH/Social Hx have been reviewed and reconciled in chart. No significant changes unless documented in the pertinent chart section. Updates made when necessary.       PHYSICAL EXAM     VITAL SIGNS: There were no vitals taken for this visit.    PREVIOUS WEIGHTS:  Wt Readings from Last 3 Encounters:   11/13/23 59.8 kg (131 lb 12.8 oz)   10/24/23 59 kg (130 lb)   10/20/23 59 kg (130 lb)       Limited telehealth examination  PHYSICAL EXAMINATION  General appearance: awake alert in NAD  Affect: normal  Skin: no rash on areas visible to the video  HEENT: Nasal congestion absent  Teeth: normal dentition  Lungs: no cough.  Extr: grossly normal on areas visible to the video  Neuro: normal speech           RESULTS/DATA     No results found for: \"IRON\", \"TRANSFERRIN\", \"IRONSAT\", \"TIBC\", \"FERRITIN\"    Bicarbonate   Date Value Ref Range Status   11/27/2022 27 24 - 31 MMOL/L Final       PAP Adherence  Not applicable      ASSESSMENT/PLAN     Assessment/Plan  Mady Chapa is a 58 y.o. female presents today in [facility] with the following problems:    SLEEP DISORDERED BREATHING/SUSPECTED SLEEP APNEA and SNORING,  s/p tonsillectomy  - Patient's risk factors for ZAIN: age, postmenopause, family history, and narrow crowded upper airway anatomy  - Current symptoms are: see HPI  - We discussed testing options today in clinic, HSAT vs In-lab  - HSAT is reasonable as patient likely has ZAIN based on history and exam and does not have any of the following comorbidities: Afib, CHF, seizures, neuromuscular weakness, hypoventilation, or significant COPD.  - Discussed ZAIN pathophysiology, diagnostic testing (HST vs PSG), cardiometabolic and neurocognitive sequelae of untreated ZAIN, and treatment options (PAP therapy, oral appliance, surgery, hypoglossal " nerve stimulator called INSPIRE, etc).        INADEQUATE SLEEP HYGIENE / EXCESSIVE DAYTIME SLEEPINESS (EDS) / FATIGUE  + SLEEP DISTURBANCES + HYPERSOMNIA  - due to combination of inadequate sleep hygiene, depression, anxiety, untreated sleep apnea, and chronic pain. Per review of medication list, it does NOT appear medications are a contributing factor.  - does watch TV and phone in bed  - discussed with patient good sleep hygiene   - Important to get good daily dose of natural sunlight to help wakefulness & energy  - Reduce artificial lighting at night, especially light from screens (i.e. cellphones, TV, tablets)  - Exercise is helpful for your mental and physical health  - Have a consistent bedtime routine 1 hours prior to your usual bedtime  - If going to take a nap, it should be less than 30 minutes and prior to 3 pm  - Limit alcohol and caffeine use   - Avoidance of marijuana and/or CBD use  - will reevaluate after successful testing and treatment of ZAIN      NORMAL BMI  - BMI most recent 24.36  - has lost ~50 lbs over past year  - Encouraged patient to continue to lose weight with diet and exercise.   - Weight loss can help in the long term treatment of ZAIN.  - Defer management to PCP    SMOKING STATUS screen  - never a smoker     All of patient's questions were answered. She verbalizes understanding and agreement with my assessment and plan.

## 2024-03-13 NOTE — PATIENT INSTRUCTIONS
It was a pleasure meeting you today Mady Chapa     As we discussed today in clinic:    1. Do in-home sleep testing.     2. Encouraged to lose weight.   3. Remember, don't drive when sleepy.   4. If the Home sleep study is not covered, we will proceed with in-lab sleep study. I will give you an antianxiety medication for the night.   5. Call and schedule follow-up after you completed your sleep study to review results.     Education handouts given: Sleep Study Information    Please follow-up in 4 - 6 weeks after testing    ALWAYS BRING YOUR CPAP / BIPAP WITH YOU TO EVERY APPOINTMENT!  THANKS    FOR QUESTIONS AND CONCERNS:   1. In case of problems with machine or mask interface, please contact your DME company first. DME is the company that provides you the machine and/or CPAP supplies. If [employer] if your DME, you can reach them at [phone] or [employer] ([address]) [phone].  2. For SLEEP STUDY appointments, please call [phone] ([facility]) or [phone] / [phone] ([facility]) or any other  Sleep Lab location please call [phone]  3. For MEDICAL QUESTIONS, MEDICATION REFILLS, or CLINIC APPOINTMENT SCHEDULING, please call [phone] and my practice lead Lelo would gladly assist you with any concerns.   4. In the event that you are running more than 10 minutes late to your appointment or 5 minutes to virtual, I will kindly ask you to reschedule.    Here at [facility], we wish you a restful sleep!

## 2024-03-13 NOTE — PROGRESS NOTES
Patient: Mady Chapa  : 1965 AGE: 58 y.o. SEX:female   MRN: [id number]   Provider: TRA[facility]   Service Date: 3/13/2024     PCP: Sil Pike MD   Referred by: Abrahan Healy PA-C            Formerly [facility] [facility]  NEW PATIENT VISIT NOTE      Virtual or Telephone Consent  An interactive audio and video telecommunication system which permits real time communications between the patient (at the originating site) and provider (at the distant site) was utilized to provide this telehealth service.   Verbal consent was requested and obtained from Mady Chapa on this date, 24 for a telehealth visit.       PATIENT INFORMATION     The patient's referring provider is: Abrahan Healy PA-C  The patient's Primary Care Provider: Sil Pike MD    HISTORY OF PRESENT ILLNESS     Patient ID: Mady Chapa is a 58 y.o. female who presents to a [facility] for evaluation for evaluation for sleep apnea    Patient is here alone today.  The patient has pertinent hx of sleep disordered breathing, sleep disturbance, snoring, inadequate sleep hygiene, EDS, fatigue, HLD, thrombocytopenia, pre-diabetes, seasonal allergies, GERD, IBS, eczema, depression, anxiety, vitamin D deficiency, glaucoma, OAB, Rheumatoid arthritis, migraines, Fibromyalgia, OA, back pain, peripheral neuropathy, and chronic pain.     24  Patient here today for evaluation for ZAIN. She reports that she had an abnormal ct-scan. She was told that she may have sleep apnea. Her boyfriend tells her she snores. She does reports sleep disturbances at night, difficulty falling asleep, EDS, fatigue, unrefreshing sleep, nocturia, headaches in the morning, feeling irritable during the day, poor concentration and lack of energy. She has never had a sleep study in the past. She has been working on weight loss, she is down to 120 lb from 175 lb over  this past year. She does take Gabapentin and propranolol at night for pain and anxiety management which helps with sleep.   I discussed with her testing options, she does have Medicare and Medicaid Insurance. I will start with HSAT, but patient was made aware that it may not be covered and will need to be switched to in-lab. She verbalized understanding and agreeable to plan.       SLEEP HISTORY       SLEEP-WAKE SCHEDULE  Bedtime:  10 - 11 pm daily  Subjective sleep latency: 15 - 60 mins  Difficulty falling asleep: Yes  due to mind-racing / rumination / worry  Number of awakenings: x1-2 times per night   Falls back asleep in 5 - 15 mins  Difficulty staying asleep: No  due to nocturia  Final wake time:  6 - 8 am daily  Out of bed time:  6 - 8 am daily  Shift work: on disability  Naps: rarely  Feels rested after a nap: N/A  Average sleep duration (excluding naps): 6 - 7 hrs    SLEEP ENVIRONMENT  Sleep location: bed  Sleep status: sleeps with boyfriend  Preferred sleep position: side  TV in bedroom: no  Room is dark:  Yes  Room is quiet: Yes  Room is cool: Yes  Bed comfort: good    SLEEP HABITS   Activities before bedtime: TV, phone  Activities in bed: phone  Clockwatching: No   Smoking: never  ETOH: denied  Marijuana: denied  Caffeine: coffee throughout the day  Sleep aids: denied    WEIGHT: lost ~50 lbs over 1 year     Claustrophobia: No     STOP-BAN        REVIEW OF SYSTEMS     REVIEW OF SYSTEMS  Sleep-related ROS:  Night symptoms: POSITIVE for snoring, witnessed apnea, wake up gasping and/or choking for air, nasal congestion , and nocturia  Morning symptoms: POSITIVE for unrefreshing sleep, morning headache, and morning dry mouth  Daytime symptoms POSITIVE for excessive daytime sleepiness, fatigue, trouble staying focused in daytime, and irritability in daytime  Hypersomnia / narcolepsy symptoms: POSITIVE for disturbed nocturnal sleep  Parasomnia symptoms: Patient denies symptoms of parasomnia.  Movements in  sleep: Patient denies problematic movements in sleep,     RLS screen:  RLSSCREEN: - Sensations: Patient does not have unusual sensations in their extremities that cause an urge to move them     Naps:   No.   Fatigue: symptoms bothersome, but easily able to carry out all usual work/school/family activities    All other systems have been reviewed and are negative.      ALLERGIES AND MEDICATIONS     ALLERGIES  Allergies   Allergen Reactions    Amitriptyline Angioedema    Biotin Swelling    Clindamycin Unknown    Doxycycline Unknown    Latex Other    Metronidazole Diarrhea    Nortriptyline Unknown    Omeprazole Other     Tongue swelling    Sulfamethoxazole-Trimethoprim Unknown    Cephalexin Rash    Ciprofloxacin Rash    Magnesium Citrate Rash    Nitrofurantoin Monohyd/M-Cryst Rash    Penicillins Rash    Sulfa (Sulfonamide Antibiotics) Rash and Unknown       MEDICATIONS  Current Outpatient Medications   Medication Sig Dispense Refill    acetaminophen (Tylenol) 500 mg tablet Take 1 tablet (500 mg) by mouth. TAKE 1 TABLET EVERY 4 TO 6 HOURS AS NEEDED.      ammonium lactate (Lac-Hydrin) 12 % lotion Apply 1 Application topically early in the morning.. APPLY SPARINGLY ONCE DAILY TO ENTIRE BODY      atorvastatin (Lipitor) 20 mg tablet TAKE 1 TABLET BY MOUTH ONCE  DAILY 100 tablet 2    brimonidine-timoloL (Combigan) 0.2-0.5 % ophthalmic solution       butalbital-acetaminophen-caff -40 mg tablet       cholecalciferol (Vitamin D-3) 25 MCG (1000 UT) capsule Take 1 capsule (25 mcg) by mouth once daily.      diclofenac sodium 1 % kit Apply topically.      dicyclomine (Bentyl) 20 mg tablet Take 1 tablet (20 mg) by mouth every 6 hours if needed.      eletriptan (Relpax) 40 mg tablet       erythromycin ethylsuccinate (E.E.S. 400) 400 mg tablet Take 1 tablet (400 mg) by mouth 3 times a day. 21 tablet 0    famotidine (Pepcid) 20 mg tablet Take 1 tablet (20 mg) by mouth once daily as needed.      gabapentin (Neurontin) 300 mg  capsule Take 1 capsule (300 mg) by mouth 2 times a day.      latanoprost (Xalatan) 0.005 % ophthalmic solution Administer into affected eye(s).      loratadine (Claritin) 10 mg tablet Take 1 tablet (10 mg) by mouth once daily.      Lumigan 0.01 % ophthalmic solution       meclizine (Antivert) 25 mg tablet Take 1 tablet (25 mg) by mouth 2 times a day as needed.      metFORMIN (Glucophage) 500 mg tablet TAKE 1 TABLET BY MOUTH ONCE  DAILY 100 tablet 2    polyethylene glycol (Glycolax, Miralax) packet Take 34 g by mouth once daily.      propranolol XL (Innopran XL) 120 mg 24 hr capsule Take 1 capsule (120 mg) by mouth early in the morning..      psyllium (Metamucil, sugar,) powder Take by mouth.      Rocklatan 0.02-0.005 % drops       sodium chloride (Ayr) 0.65 % nasal drops Administer 1-2 drops into affected nostril(s). 1-2 sprays in each nostril 2-3 times/day as needed       No current facility-administered medications for this visit.         PAST HISTORY     PERTINENT PAST MEDICAL HISTORY: See HPI    PERTINENT PAST SURGICAL HISTORY for Sleep Medicine:  tonsillectomy    PERTINENT FAMILY HISTORY for Sleep Medicine:  sleep apnea- father    PERTINENT SOCIAL HISTORY:  She  reports that she has never smoked. She has never been exposed to tobacco smoke. She has never used smokeless tobacco. She reports that she does not drink alcohol and does not use drugs. She currently lives with partner and on disability    Active Problems, Allergy List, Medication List, and PMH/PSH/FH/Social Hx have been reviewed and reconciled in chart. No significant changes unless documented in the pertinent chart section. Updates made when necessary.       PHYSICAL EXAM     VITAL SIGNS: There were no vitals taken for this visit.    PREVIOUS WEIGHTS:  Wt Readings from Last 3 Encounters:   11/13/23 59.8 kg (131 lb 12.8 oz)   10/24/23 59 kg (130 lb)   10/20/23 59 kg (130 lb)       Limited telehealth examination  PHYSICAL EXAMINATION  General  "appearance: awake alert in NAD  Affect: normal  Skin: no rash on areas visible to the video  HEENT: Nasal congestion absent  Teeth: normal dentition  Lungs: no cough.  Extr: grossly normal on areas visible to the video  Neuro: normal speech           RESULTS/DATA     No results found for: \"IRON\", \"TRANSFERRIN\", \"IRONSAT\", \"TIBC\", \"FERRITIN\"    Bicarbonate   Date Value Ref Range Status   11/27/2022 27 24 - 31 MMOL/L Final       PAP Adherence  Not applicable      ASSESSMENT/PLAN     Assessment/Plan   Mady Chapa is a 58 y.o. female presents today in [facility] with the following problems:    SLEEP DISORDERED BREATHING/SUSPECTED SLEEP APNEA and SNORING,  s/p tonsillectomy  - Patient's risk factors for ZAIN: age, postmenopause, family history, and narrow crowded upper airway anatomy  - Current symptoms are: see HPI  - We discussed testing options today in clinic, HSAT vs In-lab  - HSAT is reasonable as patient likely has ZAIN based on history and exam and does not have any of the following comorbidities: Afib, CHF, seizures, neuromuscular weakness, hypoventilation, or significant COPD.  - Discussed ZAIN pathophysiology, diagnostic testing (HST vs PSG), cardiometabolic and neurocognitive sequelae of untreated ZAIN, and treatment options (PAP therapy, oral appliance, surgery, hypoglossal nerve stimulator called INSPIRE, etc).        INADEQUATE SLEEP HYGIENE / EXCESSIVE DAYTIME SLEEPINESS (EDS) / FATIGUE  + SLEEP DISTURBANCES + HYPERSOMNIA  - due to combination of inadequate sleep hygiene, depression, anxiety, untreated sleep apnea, and chronic pain. Per review of medication list, it does NOT appear medications are a contributing factor.  - does watch TV and phone in bed  - discussed with patient good sleep hygiene   - Important to get good daily dose of natural sunlight to help wakefulness & energy  - Reduce artificial lighting at night, especially light from screens (i.e. cellphones, TV, " tablets)  - Exercise is helpful for your mental and physical health  - Have a consistent bedtime routine 1 hours prior to your usual bedtime  - If going to take a nap, it should be less than 30 minutes and prior to 3 pm  - Limit alcohol and caffeine use   - Avoidance of marijuana and/or CBD use  - will reevaluate after successful testing and treatment of ZAIN      NORMAL BMI  - BMI most recent 24.36  - has lost ~50 lbs over past year  - Encouraged patient to continue to lose weight with diet and exercise.   - Weight loss can help in the long term treatment of ZAIN.  - Defer management to PCP    SMOKING STATUS screen  - never a smoker     All of patient's questions were answered. She verbalizes understanding and agreement with my assessment and plan.

## 2024-03-14 ENCOUNTER — OFFICE VISIT (OUTPATIENT)
Dept: PRIMARY CARE | Facility: CLINIC | Age: 59
End: 2024-03-14
Payer: COMMERCIAL

## 2024-03-14 VITALS
HEART RATE: 52 BPM | SYSTOLIC BLOOD PRESSURE: 123 MMHG | BODY MASS INDEX: 24.8 KG/M2 | WEIGHT: 123 LBS | HEIGHT: 59 IN | DIASTOLIC BLOOD PRESSURE: 68 MMHG

## 2024-03-14 DIAGNOSIS — G47.30 SLEEP-DISORDERED BREATHING: ICD-10-CM

## 2024-03-14 DIAGNOSIS — E04.2 MULTIPLE THYROID NODULES: ICD-10-CM

## 2024-03-14 DIAGNOSIS — Z00.00 MEDICARE ANNUAL WELLNESS VISIT, SUBSEQUENT: Primary | ICD-10-CM

## 2024-03-14 DIAGNOSIS — K81.1 CHRONIC CHOLECYSTITIS: ICD-10-CM

## 2024-03-14 DIAGNOSIS — K21.9 GASTROESOPHAGEAL REFLUX DISEASE, UNSPECIFIED WHETHER ESOPHAGITIS PRESENT: ICD-10-CM

## 2024-03-14 DIAGNOSIS — N20.0 KIDNEY STONE: ICD-10-CM

## 2024-03-14 DIAGNOSIS — M54.6 ACUTE MIDLINE THORACIC BACK PAIN: ICD-10-CM

## 2024-03-14 DIAGNOSIS — Z12.31 SCREENING MAMMOGRAM FOR BREAST CANCER: ICD-10-CM

## 2024-03-14 DIAGNOSIS — K59.00 CONSTIPATION, UNSPECIFIED CONSTIPATION TYPE: ICD-10-CM

## 2024-03-14 DIAGNOSIS — E55.9 VITAMIN D DEFICIENCY: ICD-10-CM

## 2024-03-14 DIAGNOSIS — R07.81 CHEST PAIN, PLEURITIC: ICD-10-CM

## 2024-03-14 DIAGNOSIS — K76.0 FATTY LIVER: ICD-10-CM

## 2024-03-14 DIAGNOSIS — R73.03 PREDIABETES: ICD-10-CM

## 2024-03-14 DIAGNOSIS — D69.6 THROMBOCYTOPENIA (CMS-HCC): ICD-10-CM

## 2024-03-14 DIAGNOSIS — H40.9 GLAUCOMA, UNSPECIFIED GLAUCOMA TYPE, UNSPECIFIED LATERALITY: ICD-10-CM

## 2024-03-14 DIAGNOSIS — G43.809 OTHER MIGRAINE WITHOUT STATUS MIGRAINOSUS, NOT INTRACTABLE: ICD-10-CM

## 2024-03-14 DIAGNOSIS — E78.00 HIGH BLOOD CHOLESTEROL: ICD-10-CM

## 2024-03-14 PROBLEM — H54.40 BLIND LEFT EYE: Status: ACTIVE | Noted: 2024-03-14

## 2024-03-14 PROBLEM — L03.115 CELLULITIS OF RIGHT LOWER EXTREMITY: Status: RESOLVED | Noted: 2023-11-13 | Resolved: 2024-03-14

## 2024-03-14 PROBLEM — R13.10 DYSPHAGIA: Status: RESOLVED | Noted: 2023-10-20 | Resolved: 2024-03-14

## 2024-03-14 PROBLEM — E66.3 OVERWEIGHT WITH BODY MASS INDEX (BMI) OF 25 TO 25.9 IN ADULT: Status: RESOLVED | Noted: 2023-03-07 | Resolved: 2024-03-14

## 2024-03-14 PROBLEM — E66.9 OBESITY: Status: RESOLVED | Noted: 2023-03-07 | Resolved: 2024-03-14

## 2024-03-14 PROBLEM — Z78.9 NEVER SMOKED TOBACCO: Status: RESOLVED | Noted: 2024-03-13 | Resolved: 2024-03-14

## 2024-03-14 PROBLEM — H54.8 LEGALLY BLIND IN RIGHT EYE, AS DEFINED IN USA: Status: RESOLVED | Noted: 2023-03-07 | Resolved: 2024-03-14

## 2024-03-14 PROBLEM — H54.40 BLIND LEFT EYE: Status: RESOLVED | Noted: 2024-03-14 | Resolved: 2024-03-14

## 2024-03-14 PROCEDURE — 84443 ASSAY THYROID STIM HORMONE: CPT | Performed by: PEDIATRICS

## 2024-03-14 PROCEDURE — 83036 HEMOGLOBIN GLYCOSYLATED A1C: CPT | Performed by: PEDIATRICS

## 2024-03-14 PROCEDURE — 85025 COMPLETE CBC W/AUTO DIFF WBC: CPT | Performed by: PEDIATRICS

## 2024-03-14 PROCEDURE — 99213 OFFICE O/P EST LOW 20 MIN: CPT | Performed by: PEDIATRICS

## 2024-03-14 PROCEDURE — 80053 COMPREHEN METABOLIC PANEL: CPT | Performed by: PEDIATRICS

## 2024-03-14 PROCEDURE — G0439 PPPS, SUBSEQ VISIT: HCPCS | Performed by: PEDIATRICS

## 2024-03-14 PROCEDURE — 3008F BODY MASS INDEX DOCD: CPT | Performed by: PEDIATRICS

## 2024-03-14 PROCEDURE — 1036F TOBACCO NON-USER: CPT | Performed by: PEDIATRICS

## 2024-03-14 ASSESSMENT — PAIN SCALES - GENERAL: PAINLEVEL: 0-NO PAIN

## 2024-03-14 ASSESSMENT — PATIENT HEALTH QUESTIONNAIRE - PHQ9
3. TROUBLE FALLING OR STAYING ASLEEP OR SLEEPING TOO MUCH: NEARLY EVERY DAY
2. FEELING DOWN, DEPRESSED OR HOPELESS: NEARLY EVERY DAY
SUM OF ALL RESPONSES TO PHQ QUESTIONS 1-9: 20
SUM OF ALL RESPONSES TO PHQ9 QUESTIONS 1 AND 2: 6
1. LITTLE INTEREST OR PLEASURE IN DOING THINGS: NEARLY EVERY DAY
7. TROUBLE CONCENTRATING ON THINGS, SUCH AS READING THE NEWSPAPER OR WATCHING TELEVISION: MORE THAN HALF THE DAYS
6. FEELING BAD ABOUT YOURSELF - OR THAT YOU ARE A FAILURE OR HAVE LET YOURSELF OR YOUR FAMILY DOWN: MORE THAN HALF THE DAYS
5. POOR APPETITE OR OVEREATING: NEARLY EVERY DAY
4. FEELING TIRED OR HAVING LITTLE ENERGY: NEARLY EVERY DAY
9. THOUGHTS THAT YOU WOULD BE BETTER OFF DEAD, OR OF HURTING YOURSELF: NOT AT ALL
10. IF YOU CHECKED OFF ANY PROBLEMS, HOW DIFFICULT HAVE THESE PROBLEMS MADE IT FOR YOU TO DO YOUR WORK, TAKE CARE OF THINGS AT HOME, OR GET ALONG WITH OTHER PEOPLE: SOMEWHAT DIFFICULT
8. MOVING OR SPEAKING SO SLOWLY THAT OTHER PEOPLE COULD HAVE NOTICED. OR THE OPPOSITE, BEING SO FIGETY OR RESTLESS THAT YOU HAVE BEEN MOVING AROUND A LOT MORE THAN USUAL: SEVERAL DAYS

## 2024-03-14 ASSESSMENT — ACTIVITIES OF DAILY LIVING (ADL)
BATHING: INDEPENDENT
DRESSING: INDEPENDENT

## 2024-03-14 NOTE — PROGRESS NOTES
"Subjective   Patient ID: Mady Chapa is a 58 y.o. female who presents for Medicare Wellness    HPI   Patient feeling depressed since Justice her boyfriend has been in the hospital. She is still anxious. She only saw psychiatrist one time.  She is talking to her therapist weekly through [facility].  Colonoscopy due next year.  GYN--Dr Huertas  She is diagnosed with generalized anxiety disorder. Her significant other (who has been her significant other for 38 years) will be in a nursing home. She is too anxious to stay home alone and she is blind in right eye as well. She wants to be in long term care with him.  Review of Systems    Objective   /68   Pulse 52   Ht 1.499 m (4' 11\")   Wt 55.8 kg (123 lb)   BMI 24.84 kg/m²     Physical Exam  Vitals reviewed.   Constitutional:       General: She is not in acute distress.  HENT:      Head: Normocephalic.      Right Ear: Tympanic membrane normal.      Left Ear: Tympanic membrane normal.      Nose: Nose normal.      Mouth/Throat:      Pharynx: Oropharynx is clear.   Cardiovascular:      Rate and Rhythm: Normal rate and regular rhythm.      Heart sounds: Normal heart sounds.   Pulmonary:      Breath sounds: Normal breath sounds.   Abdominal:      Palpations: Abdomen is soft.      Tenderness: There is no abdominal tenderness.   Musculoskeletal:         General: No tenderness. Normal range of motion.   Skin:     Findings: No rash.   Neurological:      General: No focal deficit present.      Mental Status: She is alert.   Psychiatric:         Mood and Affect: Mood normal.         Assessment/Plan   Problem List Items Addressed This Visit             ICD-10-CM    Constipation K59.00     Takes miralax         RESOLVED: Chest pain, pleuritic R07.81    Fatty liver K76.0    Relevant Orders    Fibroscan (Liver Elastography) GI    Comprehensive Metabolic Panel    Gastroesophageal reflux disease K21.9     Controlled on Famotidine         Glaucoma H40.9     Sees Dr Urrutia         " High blood cholesterol E78.00    Migraine G43.909     Every 2 weeks; relpax helps         Multiple thyroid nodules E04.2     Deemed nonsuspicious per ultrasound         Relevant Orders    Thyroid Stimulating Hormone    Prediabetes R73.03    Relevant Orders    Hemoglobin A1C    Thrombocytopenia (CMS/HCC) D69.6    Thoracic back pain M54.6     Occasional due to DJD         Vitamin D deficiency E55.9    Chronic cholecystitis K81.1     Still has gallbladder; healthy diet helped pain to resolve; will see GI in May         RESOLVED: Kidney stone N20.0    Sleep-disordered breathing G47.30     Awaiting insurance approval for home sleep study         Relevant Orders    CBC     Other Visit Diagnoses         Codes    Medicare annual wellness visit, subsequent    -  Primary Z00.00    Screening mammogram for breast cancer     Z12.31    Relevant Orders    BI mammo bilateral screening tomosynthesis

## 2024-03-14 NOTE — PATIENT INSTRUCTIONS
Patient would benefit from long term care due to anxiety without her significant other who is going to long term care.  Return in 6 months

## 2024-03-15 ENCOUNTER — TELEPHONE (OUTPATIENT)
Dept: PRIMARY CARE | Facility: CLINIC | Age: 59
End: 2024-03-15

## 2024-03-15 ENCOUNTER — APPOINTMENT (OUTPATIENT)
Dept: PRIMARY CARE | Facility: CLINIC | Age: 59
End: 2024-03-15
Payer: COMMERCIAL

## 2024-03-18 ENCOUNTER — APPOINTMENT (OUTPATIENT)
Dept: RADIOLOGY | Facility: HOSPITAL | Age: 59
End: 2024-03-18
Payer: COMMERCIAL

## 2024-03-18 NOTE — TELEPHONE ENCOUNTER
Pt would like a call back in regards to knowing if you are able to do a prior auth for her to go to a nursing home.   
yes

## 2024-03-19 ENCOUNTER — APPOINTMENT (OUTPATIENT)
Dept: PRIMARY CARE | Facility: CLINIC | Age: 59
End: 2024-03-19
Payer: COMMERCIAL

## 2024-03-22 ENCOUNTER — APPOINTMENT (OUTPATIENT)
Dept: AUDIOLOGY | Facility: CLINIC | Age: 59
End: 2024-03-22
Payer: COMMERCIAL

## 2024-03-22 ENCOUNTER — APPOINTMENT (OUTPATIENT)
Dept: OTOLARYNGOLOGY | Facility: CLINIC | Age: 59
End: 2024-03-22
Payer: COMMERCIAL

## 2024-04-02 ENCOUNTER — APPOINTMENT (OUTPATIENT)
Dept: PRIMARY CARE | Facility: CLINIC | Age: 59
End: 2024-04-02
Payer: COMMERCIAL

## 2024-04-11 ENCOUNTER — TELEMEDICINE (OUTPATIENT)
Dept: PRIMARY CARE | Facility: CLINIC | Age: 59
End: 2024-04-11
Payer: COMMERCIAL

## 2024-04-11 DIAGNOSIS — J06.9 UPPER RESPIRATORY TRACT INFECTION, UNSPECIFIED TYPE: Primary | ICD-10-CM

## 2024-04-11 PROCEDURE — 3008F BODY MASS INDEX DOCD: CPT | Performed by: PEDIATRICS

## 2024-04-11 PROCEDURE — 99212 OFFICE O/P EST SF 10 MIN: CPT | Performed by: PEDIATRICS

## 2024-04-11 NOTE — PROGRESS NOTES
Subjective   Patient ID: Mady Chapa is a 58 y.o. female who presents for cough  Video visit  HPI   Patient has had a cough for a few days, clear runny nose, watery eyes, sneezing  Review of Systems    Objective   There were no vitals taken for this visit.  Temp 98.8  Physical Exam  NAD  Assessment/Plan   Problem List Items Addressed This Visit             ICD-10-CM    URI (upper respiratory infection) - Primary J06.9

## 2024-04-12 PROBLEM — J06.9 URI (UPPER RESPIRATORY INFECTION): Status: ACTIVE | Noted: 2024-04-12

## 2024-04-17 ENCOUNTER — TELEPHONE (OUTPATIENT)
Dept: PRIMARY CARE | Facility: CLINIC | Age: 59
End: 2024-04-17
Payer: COMMERCIAL

## 2024-04-17 DIAGNOSIS — J40 BRONCHITIS: Primary | ICD-10-CM

## 2024-04-17 RX ORDER — AZITHROMYCIN 250 MG/1
TABLET, FILM COATED ORAL DAILY
Qty: 6 TABLET | Refills: 0 | Status: SHIPPED | OUTPATIENT
Start: 2024-04-17 | End: 2024-04-22

## 2024-04-17 NOTE — TELEPHONE ENCOUNTER
Pt stated that she is dealing with a cough and previously a fever of 100.7 for one day and she took tylenol and it went away, took a covid test and that was negative. She was requesting if she could get any antibiotics for the cough or what she could take to help.

## 2024-05-03 ENCOUNTER — OFFICE VISIT (OUTPATIENT)
Dept: PRIMARY CARE | Facility: CLINIC | Age: 59
End: 2024-05-03
Payer: COMMERCIAL

## 2024-05-03 ENCOUNTER — HOSPITAL ENCOUNTER (OUTPATIENT)
Dept: RADIOLOGY | Facility: CLINIC | Age: 59
Discharge: HOME | End: 2024-05-03
Payer: COMMERCIAL

## 2024-05-03 VITALS
DIASTOLIC BLOOD PRESSURE: 59 MMHG | OXYGEN SATURATION: 97 % | SYSTOLIC BLOOD PRESSURE: 93 MMHG | HEART RATE: 55 BPM | HEIGHT: 59 IN | RESPIRATION RATE: 16 BRPM | TEMPERATURE: 96.3 F | WEIGHT: 121 LBS | BODY MASS INDEX: 24.39 KG/M2

## 2024-05-03 DIAGNOSIS — R73.03 PREDIABETES: ICD-10-CM

## 2024-05-03 DIAGNOSIS — M25.551 RIGHT HIP PAIN: ICD-10-CM

## 2024-05-03 DIAGNOSIS — R11.11 VOMITING WITHOUT NAUSEA, UNSPECIFIED VOMITING TYPE: Primary | ICD-10-CM

## 2024-05-03 LAB
APPEARANCE UR: CLEAR
BILIRUB UR QL STRIP: ABNORMAL
COLOR UR: YELLOW
GLUCOSE UR STRIP-MCNC: NEGATIVE MG/DL
HGB UR QL STRIP: NEGATIVE
KETONES UR STRIP-MCNC: NEGATIVE MG/DL
LEUKOCYTE ESTERASE UR QL STRIP: NEGATIVE
NITRITE UR QL STRIP: NEGATIVE
PH UR STRIP: 5.5 [PH]
POC FINGERSTICK BLOOD GLUCOSE: 110 MG/DL (ref 70–100)
PROT UR STRIP-MCNC: ABNORMAL MG/DL
SP GR UR STRIP.AUTO: >=1.03
UROBILINOGEN UR STRIP-ACNC: 0.2 E.U./DL

## 2024-05-03 PROCEDURE — 81003 URINALYSIS AUTO W/O SCOPE: CPT | Performed by: PEDIATRICS

## 2024-05-03 PROCEDURE — 85025 COMPLETE CBC W/AUTO DIFF WBC: CPT | Performed by: PEDIATRICS

## 2024-05-03 PROCEDURE — 99214 OFFICE O/P EST MOD 30 MIN: CPT | Performed by: PEDIATRICS

## 2024-05-03 PROCEDURE — 80053 COMPREHEN METABOLIC PANEL: CPT | Performed by: PEDIATRICS

## 2024-05-03 PROCEDURE — 3008F BODY MASS INDEX DOCD: CPT | Performed by: PEDIATRICS

## 2024-05-03 PROCEDURE — 73502 X-RAY EXAM HIP UNI 2-3 VIEWS: CPT | Mod: RT

## 2024-05-03 PROCEDURE — 73502 X-RAY EXAM HIP UNI 2-3 VIEWS: CPT | Mod: RIGHT SIDE | Performed by: RADIOLOGY

## 2024-05-03 PROCEDURE — 82962 GLUCOSE BLOOD TEST: CPT | Performed by: PEDIATRICS

## 2024-05-03 ASSESSMENT — PATIENT HEALTH QUESTIONNAIRE - PHQ9
1. LITTLE INTEREST OR PLEASURE IN DOING THINGS: NOT AT ALL
SUM OF ALL RESPONSES TO PHQ9 QUESTIONS 1 AND 2: 0
2. FEELING DOWN, DEPRESSED OR HOPELESS: NOT AT ALL

## 2024-05-03 ASSESSMENT — PAIN SCALES - GENERAL: PAINLEVEL: 8

## 2024-05-03 NOTE — PROGRESS NOTES
"Subjective   Patient ID: Mady Chapa is a 58 y.o. female who presents for Abdominal Pain.    HPI   Vomited 15 times 2 days ago; developed right hip pain; hurts to bend over and to walk.  No diarrhea;   Ate ok yesterday;   Review of Systems    Objective   BP 93/59 (BP Location: Left arm, Patient Position: Sitting, BP Cuff Size: Adult)   Pulse 55   Temp 35.7 °C (96.3 °F) (Temporal)   Resp 16   Ht 1.626 m (5' 4\")   Wt 54.9 kg (121 lb)   SpO2 97%   BMI 20.77 kg/m²     Physical Exam  Abd soft and nontender  Tender right hip  Mild suprapubic tenderness  Assessment/Plan   Problem List Items Addressed This Visit             ICD-10-CM    Right hip pain M25.551    Relevant Orders    XR hip right with pelvis when performed 1 view    XR pelvis 1-2 views     Other Visit Diagnoses         Codes    Vomiting without nausea, unspecified vomiting type    -  Primary R11.11    Relevant Orders    POCT UA (Automated) docked device    Comprehensive Metabolic Panel    CBC               "

## 2024-05-10 ENCOUNTER — APPOINTMENT (OUTPATIENT)
Dept: AUDIOLOGY | Facility: CLINIC | Age: 59
End: 2024-05-10
Payer: COMMERCIAL

## 2024-05-10 ENCOUNTER — APPOINTMENT (OUTPATIENT)
Dept: OTOLARYNGOLOGY | Facility: CLINIC | Age: 59
End: 2024-05-10
Payer: COMMERCIAL

## 2024-05-14 ENCOUNTER — TELEPHONE (OUTPATIENT)
Dept: OBSTETRICS AND GYNECOLOGY | Facility: CLINIC | Age: 59
End: 2024-05-14
Payer: COMMERCIAL

## 2024-05-14 DIAGNOSIS — N63.10 MASS OF RIGHT BREAST, UNSPECIFIED QUADRANT: Primary | ICD-10-CM

## 2024-05-14 NOTE — TELEPHONE ENCOUNTER
Pt has an order for screening mammogram but states she feels a lump in her (R) breast and is requesting for order to be changed to screening mammogram.  Mammogram ordered

## 2024-05-29 ENCOUNTER — APPOINTMENT (OUTPATIENT)
Dept: RADIOLOGY | Facility: CLINIC | Age: 59
End: 2024-05-29
Payer: COMMERCIAL

## 2024-06-05 DIAGNOSIS — E78.00 HIGH BLOOD CHOLESTEROL: ICD-10-CM

## 2024-06-05 DIAGNOSIS — R73.03 PREDIABETES: ICD-10-CM

## 2024-06-06 ENCOUNTER — APPOINTMENT (OUTPATIENT)
Dept: RADIOLOGY | Facility: HOSPITAL | Age: 59
End: 2024-06-06
Payer: COMMERCIAL

## 2024-06-06 RX ORDER — METFORMIN HYDROCHLORIDE 500 MG/1
500 TABLET ORAL DAILY
Qty: 100 TABLET | Refills: 2 | Status: SHIPPED | OUTPATIENT
Start: 2024-06-06

## 2024-06-06 RX ORDER — ATORVASTATIN CALCIUM 20 MG/1
20 TABLET, FILM COATED ORAL DAILY
Qty: 100 TABLET | Refills: 2 | Status: SHIPPED | OUTPATIENT
Start: 2024-06-06

## 2024-06-11 ENCOUNTER — APPOINTMENT (OUTPATIENT)
Dept: RADIOLOGY | Facility: HOSPITAL | Age: 59
End: 2024-06-11
Payer: COMMERCIAL

## 2024-06-11 ENCOUNTER — TELEPHONE (OUTPATIENT)
Dept: PRIMARY CARE | Facility: CLINIC | Age: 59
End: 2024-06-11
Payer: COMMERCIAL

## 2024-06-11 NOTE — TELEPHONE ENCOUNTER
Pt called and she said a lamp fell on her head and she is experiencing a little bit of pain. The light bulb did not break on her head and there isn't a bump.  She was wondering if you  could give her a call to advise her on what she should do for the slight pain.

## 2024-06-12 ENCOUNTER — APPOINTMENT (OUTPATIENT)
Dept: RADIOLOGY | Facility: CLINIC | Age: 59
End: 2024-06-12
Payer: COMMERCIAL

## 2024-06-12 ENCOUNTER — OFFICE VISIT (OUTPATIENT)
Dept: PRIMARY CARE | Facility: CLINIC | Age: 59
End: 2024-06-12
Payer: COMMERCIAL

## 2024-06-12 VITALS
HEART RATE: 55 BPM | DIASTOLIC BLOOD PRESSURE: 88 MMHG | OXYGEN SATURATION: 97 % | SYSTOLIC BLOOD PRESSURE: 136 MMHG | WEIGHT: 127.8 LBS | TEMPERATURE: 98.3 F | BODY MASS INDEX: 25.81 KG/M2

## 2024-06-12 DIAGNOSIS — S06.0X0A CONCUSSION WITHOUT LOSS OF CONSCIOUSNESS, INITIAL ENCOUNTER: Primary | ICD-10-CM

## 2024-06-12 PROBLEM — S06.0XAA MILD CONCUSSION: Status: ACTIVE | Noted: 2024-06-12

## 2024-06-12 PROCEDURE — 3008F BODY MASS INDEX DOCD: CPT | Performed by: PEDIATRICS

## 2024-06-12 PROCEDURE — 1036F TOBACCO NON-USER: CPT | Performed by: PEDIATRICS

## 2024-06-12 PROCEDURE — 99213 OFFICE O/P EST LOW 20 MIN: CPT | Performed by: PEDIATRICS

## 2024-06-12 ASSESSMENT — ENCOUNTER SYMPTOMS
LOSS OF SENSATION IN FEET: 0
OCCASIONAL FEELINGS OF UNSTEADINESS: 0
DEPRESSION: 0

## 2024-06-12 ASSESSMENT — PAIN SCALES - GENERAL: PAINLEVEL: 6

## 2024-06-12 NOTE — PROGRESS NOTES
Subjective   Patient ID: Mady Chapa is a 59 y.o. female who presents for Head Injury (Lamp fell on back of head, yesterday).    HPI   Patient states a porcelain lamp fell onto the back of head and neck when she was stooping down looking for something. She accidentally had yanked the cord of the lamp causing it to fall on her. She did not lose consciousness. She felt a pain on back of neck and head. Currently if she sits still, she has 4/10 pain in the back of head; if she moves her neck she has 6/10 pain; no dizziness or double vision; Tylenol does help pain to decrease a lot;  Review of Systems    Objective   /88 (BP Location: Left arm, Patient Position: Sitting, BP Cuff Size: Adult)   Pulse 55   Temp 36.8 °C (98.3 °F) (Oral)   Wt 58 kg (127 lb 12.8 oz)   SpO2 97%   BMI 25.81 kg/m²     Physical Exam  No bruising or swelling in back of head or neck; Good ROM of neck  Chest wall tender to touch on right side  Assessment/Plan   Problem List Items Addressed This Visit             ICD-10-CM    Mild concussion - Primary S06.0XAA

## 2024-06-21 ENCOUNTER — APPOINTMENT (OUTPATIENT)
Dept: RADIOLOGY | Facility: CLINIC | Age: 59
End: 2024-06-21
Payer: COMMERCIAL

## 2024-06-25 ENCOUNTER — TELEPHONE (OUTPATIENT)
Dept: PRIMARY CARE | Facility: CLINIC | Age: 59
End: 2024-06-25

## 2024-06-25 ENCOUNTER — APPOINTMENT (OUTPATIENT)
Dept: RADIOLOGY | Facility: HOSPITAL | Age: 59
End: 2024-06-25
Payer: COMMERCIAL

## 2024-06-25 ENCOUNTER — APPOINTMENT (OUTPATIENT)
Dept: PRIMARY CARE | Facility: CLINIC | Age: 59
End: 2024-06-25
Payer: COMMERCIAL

## 2024-06-25 NOTE — TELEPHONE ENCOUNTER
Pt stated she did not want to come in and that she would cancel the appt today and was requesting a virtual and then called back and stated the numbness in her leg went away, she stated she did not fall and she was wanting to let you know that she actually was worried she could have fallen and that she only twisted her leg but aside from that the pain in her foot is the only thing she stated that is going on and would rather have a call than to come in.

## 2024-06-26 ENCOUNTER — APPOINTMENT (OUTPATIENT)
Dept: HEMATOLOGY/ONCOLOGY | Facility: CLINIC | Age: 59
End: 2024-06-26
Payer: COMMERCIAL

## 2024-07-09 ENCOUNTER — HOSPITAL ENCOUNTER (OUTPATIENT)
Dept: RADIOLOGY | Facility: HOSPITAL | Age: 59
Discharge: HOME | End: 2024-07-09
Payer: COMMERCIAL

## 2024-07-09 VITALS — BODY MASS INDEX: 25.4 KG/M2 | HEIGHT: 59 IN | WEIGHT: 126 LBS

## 2024-07-09 DIAGNOSIS — N63.10 MASS OF RIGHT BREAST, UNSPECIFIED QUADRANT: ICD-10-CM

## 2024-07-09 PROCEDURE — 76642 ULTRASOUND BREAST LIMITED: CPT | Performed by: RADIOLOGY

## 2024-07-09 PROCEDURE — 77062 BREAST TOMOSYNTHESIS BI: CPT

## 2024-07-09 PROCEDURE — 76642 ULTRASOUND BREAST LIMITED: CPT | Mod: RT

## 2024-07-09 PROCEDURE — 77066 DX MAMMO INCL CAD BI: CPT | Performed by: RADIOLOGY

## 2024-07-09 PROCEDURE — 77062 BREAST TOMOSYNTHESIS BI: CPT | Performed by: RADIOLOGY

## 2024-07-12 ENCOUNTER — APPOINTMENT (OUTPATIENT)
Dept: PRIMARY CARE | Facility: CLINIC | Age: 59
End: 2024-07-12
Payer: COMMERCIAL

## 2024-07-12 ENCOUNTER — TELEPHONE (OUTPATIENT)
Dept: HEMATOLOGY/ONCOLOGY | Facility: CLINIC | Age: 59
End: 2024-07-12

## 2024-07-15 ENCOUNTER — TELEPHONE (OUTPATIENT)
Dept: PRIMARY CARE | Facility: CLINIC | Age: 59
End: 2024-07-15
Payer: COMMERCIAL

## 2024-07-16 DIAGNOSIS — E53.8 VITAMIN B12 DEFICIENCY: Primary | ICD-10-CM

## 2024-07-16 RX ORDER — LANOLIN ALCOHOL/MO/W.PET/CERES
1000 CREAM (GRAM) TOPICAL DAILY
Qty: 30 TABLET | Refills: 11 | Status: SHIPPED | OUTPATIENT
Start: 2024-07-16 | End: 2025-07-16

## 2024-07-17 ENCOUNTER — APPOINTMENT (OUTPATIENT)
Dept: LAB | Facility: CLINIC | Age: 59
End: 2024-07-17
Payer: COMMERCIAL

## 2024-07-17 ENCOUNTER — OFFICE VISIT (OUTPATIENT)
Dept: HEMATOLOGY/ONCOLOGY | Facility: CLINIC | Age: 59
End: 2024-07-17
Payer: COMMERCIAL

## 2024-07-17 VITALS
WEIGHT: 127.32 LBS | HEART RATE: 56 BPM | OXYGEN SATURATION: 98 % | TEMPERATURE: 96.7 F | BODY MASS INDEX: 26.72 KG/M2 | RESPIRATION RATE: 16 BRPM | SYSTOLIC BLOOD PRESSURE: 124 MMHG | HEIGHT: 58 IN | DIASTOLIC BLOOD PRESSURE: 55 MMHG

## 2024-07-17 DIAGNOSIS — D69.6 THROMBOCYTOPENIA (CMS-HCC): Primary | ICD-10-CM

## 2024-07-17 DIAGNOSIS — D69.6 THROMBOCYTOPENIA (CMS-HCC): ICD-10-CM

## 2024-07-17 LAB
BASOPHILS # BLD AUTO: 0.02 X10*3/UL (ref 0–0.1)
BASOPHILS NFR BLD AUTO: 0.3 %
EOSINOPHIL # BLD AUTO: 0.27 X10*3/UL (ref 0–0.7)
EOSINOPHIL NFR BLD AUTO: 4.7 %
ERYTHROCYTE [DISTWIDTH] IN BLOOD BY AUTOMATED COUNT: 12.2 % (ref 11.5–14.5)
FERRITIN SERPL-MCNC: 54 NG/ML (ref 8–150)
HCT VFR BLD AUTO: 36 % (ref 36–46)
HGB BLD-MCNC: 12.5 G/DL (ref 12–16)
IMM GRANULOCYTES # BLD AUTO: 0.02 X10*3/UL (ref 0–0.7)
IMM GRANULOCYTES NFR BLD AUTO: 0.3 % (ref 0–0.9)
IRON SATN MFR SERPL: 33 % (ref 25–45)
IRON SERPL-MCNC: 126 UG/DL (ref 35–150)
LYMPHOCYTES # BLD AUTO: 1.81 X10*3/UL (ref 1.2–4.8)
LYMPHOCYTES NFR BLD AUTO: 31.3 %
MCH RBC QN AUTO: 31.2 PG (ref 26–34)
MCHC RBC AUTO-ENTMCNC: 34.7 G/DL (ref 32–36)
MCV RBC AUTO: 90 FL (ref 80–100)
MONOCYTES # BLD AUTO: 0.55 X10*3/UL (ref 0.1–1)
MONOCYTES NFR BLD AUTO: 9.5 %
NEUTROPHILS # BLD AUTO: 3.11 X10*3/UL (ref 1.2–7.7)
NEUTROPHILS NFR BLD AUTO: 53.9 %
NRBC BLD-RTO: 0 /100 WBCS (ref 0–0)
PLATELET # BLD AUTO: 84 X10*3/UL (ref 150–450)
RBC # BLD AUTO: 4.01 X10*6/UL (ref 4–5.2)
TIBC SERPL-MCNC: 381 UG/DL (ref 240–445)
UIBC SERPL-MCNC: 255 UG/DL (ref 110–370)
WBC # BLD AUTO: 5.8 X10*3/UL (ref 4.4–11.3)

## 2024-07-17 PROCEDURE — 82728 ASSAY OF FERRITIN: CPT

## 2024-07-17 PROCEDURE — 99213 OFFICE O/P EST LOW 20 MIN: CPT | Performed by: NURSE PRACTITIONER

## 2024-07-17 PROCEDURE — 83540 ASSAY OF IRON: CPT

## 2024-07-17 PROCEDURE — 85025 COMPLETE CBC W/AUTO DIFF WBC: CPT

## 2024-07-17 PROCEDURE — 36415 COLL VENOUS BLD VENIPUNCTURE: CPT

## 2024-07-17 PROCEDURE — 3008F BODY MASS INDEX DOCD: CPT | Performed by: NURSE PRACTITIONER

## 2024-07-17 ASSESSMENT — PATIENT HEALTH QUESTIONNAIRE - PHQ9
2. FEELING DOWN, DEPRESSED OR HOPELESS: NOT AT ALL
SUM OF ALL RESPONSES TO PHQ9 QUESTIONS 1 AND 2: 0
1. LITTLE INTEREST OR PLEASURE IN DOING THINGS: NOT AT ALL

## 2024-07-17 ASSESSMENT — COLUMBIA-SUICIDE SEVERITY RATING SCALE - C-SSRS
1. IN THE PAST MONTH, HAVE YOU WISHED YOU WERE DEAD OR WISHED YOU COULD GO TO SLEEP AND NOT WAKE UP?: NO
2. HAVE YOU ACTUALLY HAD ANY THOUGHTS OF KILLING YOURSELF?: NO
6. HAVE YOU EVER DONE ANYTHING, STARTED TO DO ANYTHING, OR PREPARED TO DO ANYTHING TO END YOUR LIFE?: NO

## 2024-07-17 ASSESSMENT — ENCOUNTER SYMPTOMS
LOSS OF SENSATION IN FEET: 1
DEPRESSION: 0
OCCASIONAL FEELINGS OF UNSTEADINESS: 1

## 2024-07-17 ASSESSMENT — PAIN SCALES - GENERAL: PAINLEVEL: 5

## 2024-07-17 NOTE — PROGRESS NOTES
"Patient ID: Mady Chapa is a 59 y.o. female.    Hematologic History:  Thrombocytopenia  Dating back to at least 2003 at which time she underwent a bone marrow biopsy which was reportedly normal. Her platelets have ranged between 70,000and 90,000 over the years.     Interval History:  Patient presents today for routine follow-up evaluation for history of thrombocytopenia. She is accompanied by her  from [employer].  On presentation she is doing will she has no specific complaints. She has not had any gross bleeding or abnormal bruising. She denies any fevers, chills or night sweats. No cough, chest pain or shortness of breath. No nausea, vomiting, no diarrhea, intermittent constipation IBS. Primary symptom is headaches/migraines occurring weekly, follows with neurology.     Review of Systems:  A review of systems has been completed and are negative for complaints except what is stated in the HPI and/or past medical history    Allergies:  Amitriptyline, biotin, clindamycin, latex, metronidazole, nortriptyline, omeprazole, Bactrim, cephalexin, ciprofloxacin, Macrobid, PCN, Sulfa, doxycycline    Medications:  Medication list reviewed with patient and updated in EMR    Past Medical History:  fibromyalgia, arthritis, migraines, sarcoma, mental health issues, irritable bowel syndrome hypercholesteremia, thrombocytopenia, vitamin B12 deficiency, loss of vision right eye    Family History:   significant for several members of the family with breast cancer including uncle, father and sister. Their BRCA testing was according to her negative. Father had bladder cancer.     Social History:   Never smoker, no alcohol   [employer]    Vital Signs:   /55 (BP Location: Right arm, Patient Position: Sitting, BP Cuff Size: Adult long)   Pulse 56   Temp 35.9 °C (96.7 °F) (Temporal)   Resp 16   Ht (S) 1.476 m (4' 10.11\")   Wt 57.7 kg (127 lb 5.1 oz)   SpO2 98%   BMI 26.51 kg/m²     Physical " Exam:  ECO-1  Pain: 0  Constitutional: Well developed, awake/alert/oriented x3, no distress, alert and cooperative  Eyes: PER. sclera anicteric  ENMT: Oral mucosa moist  Respiratory/Thorax: Breathing is non-labored. Lungs are clear to auscultation bilaterally. No adventitious breath sounds  Cardiovascular: S1-S2. Regular rate and rhythm. No murmurs, rubs, or gallops appreciated  Gastrointestinal: Abdomen soft nontender, nondistended, normal active bowel sounds. No hepatosplenomegly  Musculoskeletal: ROM intact, no joint swelling, normal strength  Extremities: normal extremities, no cyanosis, no edema, no clubbing  Neurologic: alert and oriented x3. Nonfocal exam. No myoclonus  Psychological: Pleasant, appropriate and easily engaged     Lab Results:  CBC date/time       WBC     HGB     HCT     PLT     Neut      2023 08:51   6.5     12.6    36.4    84(L)   N/A  10-Feb-2020 12:07   6.3     13.1    36.6    59(L)   3.11  26-Oct-2019 10:09   6.3     12.6    35.2(L) 58(L)   N/A     2021: WBC 6.2, hemoglobin 11.9, hematocrit  34.0, platelets 72.     2024 - WBC 7.26, hemoglobin 12.7, hematocrit 37.0, platelets 109,000     2024 - Vitamin B12 236    2024  WBC 5.8, hemoglobin 12.5, hematocrit 36.0, platelets 84,000    Labs completed by PCP can be found in Care Everywhere     Assessment:  ITP:  - on observation  - platelets have ranged between 70,000 and 90,000  - she has never required treatment   - she will contact the office if she develops any signs or symptoms of active bleeding or unusual bruising or should she require a surgical intervention.     Vitamin B12 deficiency:  - started on B12 by primary care provider at last visit    Plan:  - 6 month follow-up (12 month follow-up also scheduled)  - CBCd every 2 months  - CBCd on return          TRAVIS Castro-CNP

## 2024-07-30 ENCOUNTER — OFFICE VISIT (OUTPATIENT)
Dept: PRIMARY CARE | Facility: CLINIC | Age: 59
End: 2024-07-30
Payer: COMMERCIAL

## 2024-07-30 ENCOUNTER — TELEPHONE (OUTPATIENT)
Dept: PRIMARY CARE | Facility: CLINIC | Age: 59
End: 2024-07-30

## 2024-07-30 VITALS
HEIGHT: 58 IN | HEART RATE: 55 BPM | BODY MASS INDEX: 26.87 KG/M2 | OXYGEN SATURATION: 97 % | WEIGHT: 128 LBS | RESPIRATION RATE: 16 BRPM | TEMPERATURE: 97.3 F

## 2024-07-30 DIAGNOSIS — K21.9 GASTROESOPHAGEAL REFLUX DISEASE, UNSPECIFIED WHETHER ESOPHAGITIS PRESENT: ICD-10-CM

## 2024-07-30 DIAGNOSIS — M54.9 UPPER BACK PAIN: ICD-10-CM

## 2024-07-30 DIAGNOSIS — J02.9 PHARYNGITIS, UNSPECIFIED ETIOLOGY: Primary | ICD-10-CM

## 2024-07-30 PROCEDURE — 87081 CULTURE SCREEN ONLY: CPT

## 2024-07-30 PROCEDURE — 3008F BODY MASS INDEX DOCD: CPT | Performed by: PEDIATRICS

## 2024-07-30 PROCEDURE — 99213 OFFICE O/P EST LOW 20 MIN: CPT | Performed by: PEDIATRICS

## 2024-07-30 RX ORDER — FAMOTIDINE 40 MG/1
TABLET, FILM COATED ORAL
Qty: 90 TABLET | Refills: 3 | Status: SHIPPED | OUTPATIENT
Start: 2024-07-30 | End: 2024-07-31 | Stop reason: SDUPTHER

## 2024-07-30 ASSESSMENT — ENCOUNTER SYMPTOMS
SHORTNESS OF BREATH: 1
HEADACHES: 1
TROUBLE SWALLOWING: 1
SORE THROAT: 1

## 2024-07-30 ASSESSMENT — PAIN SCALES - GENERAL: PAINLEVEL: 6

## 2024-07-30 NOTE — PROGRESS NOTES
"Subjective   Patient ID: Mady Chapa is a 59 y.o. female who presents for Dizziness, Throat Problem, Nasal Congestion, and Back Pain.    HPI   When she swallows she feels like she that has a lump in her throat; started Famotidine 20 twice a day without benefit;   Review of Systems    Objective   Pulse 55   Temp 36.3 °C (97.3 °F) (Temporal)   Resp 16   Ht 1.473 m (4' 10\")   Wt 58.1 kg (128 lb)   SpO2 97%   BMI 26.75 kg/m²     Physical Exam  Throat and lungs clear  Chest wall slightly tender to touch,  Assessment/Plan   Problem List Items Addressed This Visit             ICD-10-CM    Gastroesophageal reflux disease K21.9    Relevant Medications    famotidine (Pepcid) 40 mg tablet    Upper back pain M54.9    Relevant Orders    Referral to Physical Therapy    Pharyngitis - Primary J02.9    Relevant Orders    Group A Streptococcus, Culture          "

## 2024-07-30 NOTE — PROGRESS NOTES
Pt. Is here for throat problem,nasal congestion and back pain.  Answers submitted by the patient for this visit:  Sore Throat Questionnaire (Submitted on 7/30/2024)  Chief Complaint: Sore throat  Chronicity: recurrent  Onset: in the past 7 days  Progression since onset: waxing and waning  Pain worse on: neither  Fever: no fever  pain severity now: moderate  Pain - numeric: 4/10  headaches: Yes  shortness of breath: Yes  trouble swallowing: Yes

## 2024-07-30 NOTE — TELEPHONE ENCOUNTER
Pt called requesting a phone call regarding her concern due to her dizziness that she is experiencing as well as throat tightness. She is scheduled 8/1 @ 12.

## 2024-07-31 ENCOUNTER — APPOINTMENT (OUTPATIENT)
Dept: PRIMARY CARE | Facility: CLINIC | Age: 59
End: 2024-07-31
Payer: COMMERCIAL

## 2024-07-31 DIAGNOSIS — K21.9 GASTROESOPHAGEAL REFLUX DISEASE, UNSPECIFIED WHETHER ESOPHAGITIS PRESENT: ICD-10-CM

## 2024-07-31 RX ORDER — FAMOTIDINE 40 MG/1
TABLET, FILM COATED ORAL
Qty: 90 TABLET | Refills: 3 | Status: SHIPPED | OUTPATIENT
Start: 2024-07-31

## 2024-08-01 ENCOUNTER — APPOINTMENT (OUTPATIENT)
Dept: PRIMARY CARE | Facility: CLINIC | Age: 59
End: 2024-08-01
Payer: COMMERCIAL

## 2024-08-01 ENCOUNTER — TELEPHONE (OUTPATIENT)
Dept: PRIMARY CARE | Facility: CLINIC | Age: 59
End: 2024-08-01

## 2024-08-01 LAB — S PYO THROAT QL CULT: NORMAL

## 2024-08-02 ENCOUNTER — APPOINTMENT (OUTPATIENT)
Dept: PRIMARY CARE | Facility: CLINIC | Age: 59
End: 2024-08-02
Payer: COMMERCIAL

## 2024-08-12 ENCOUNTER — OFFICE VISIT (OUTPATIENT)
Dept: PRIMARY CARE | Facility: CLINIC | Age: 59
End: 2024-08-12
Payer: COMMERCIAL

## 2024-08-12 VITALS
HEART RATE: 56 BPM | WEIGHT: 126 LBS | TEMPERATURE: 97.1 F | OXYGEN SATURATION: 97 % | SYSTOLIC BLOOD PRESSURE: 124 MMHG | RESPIRATION RATE: 16 BRPM | BODY MASS INDEX: 25.4 KG/M2 | DIASTOLIC BLOOD PRESSURE: 76 MMHG | HEIGHT: 59 IN

## 2024-08-12 DIAGNOSIS — K58.9 IRRITABLE BOWEL SYNDROME, UNSPECIFIED TYPE: Primary | ICD-10-CM

## 2024-08-12 PROCEDURE — 99212 OFFICE O/P EST SF 10 MIN: CPT | Performed by: PEDIATRICS

## 2024-08-12 PROCEDURE — 1036F TOBACCO NON-USER: CPT | Performed by: PEDIATRICS

## 2024-08-12 PROCEDURE — 3008F BODY MASS INDEX DOCD: CPT | Performed by: PEDIATRICS

## 2024-08-12 ASSESSMENT — ENCOUNTER SYMPTOMS
ARTHRALGIAS: 0
CONSTIPATION: 1
DYSURIA: 0
HEMATOCHEZIA: 0
WEIGHT LOSS: 0
DEPRESSION: 0
HEMATURIA: 0
NAUSEA: 0
HEADACHES: 1
OCCASIONAL FEELINGS OF UNSTEADINESS: 0
MYALGIAS: 1
DIARRHEA: 1
ABDOMINAL PAIN: 1
FEVER: 0
FLATUS: 1
BELCHING: 0
LOSS OF SENSATION IN FEET: 0
FREQUENCY: 0
ANOREXIA: 0
VOMITING: 0

## 2024-08-12 ASSESSMENT — PAIN SCALES - GENERAL: PAINLEVEL: 2

## 2024-08-12 NOTE — PROGRESS NOTES
"Subjective   Patient ID: Mady Chapa is a 59 y.o. female who presents for pain in leftside and Diarrhea.    HPI   Patient has IBS and alternates with constipation and diarrhea;     Review of Systems    Objective   Pulse 56   Temp 36.2 °C (97.1 °F) (Temporal)   Resp 16   Ht 1.499 m (4' 11\")   Wt 57.2 kg (126 lb)   SpO2 97%   BMI 25.45 kg/m²     Physical Exam  Abd soft NT  Good ROM of hips  Assessment/Plan   Problem List Items Addressed This Visit             ICD-10-CM    Irritable bowel syndrome - Primary K58.9     fibromyalgia     "

## 2024-08-12 NOTE — PROGRESS NOTES
Pt. Is here for pain in left and right side and diarrhea.  Answers submitted by the patient for this visit:  Abdominal Pain Questionnaire (Submitted on 8/12/2024)  Chief Complaint: Abdominal pain  Chronicity: recurrent  Onset: in the past 7 days  Onset quality: undetermined  Frequency: constantly  Episode duration: 2 Hours  Progression since onset: waxing and waning  Pain location: left flank, right flank  Pain - numeric: 8/10  Pain quality: sharp  Radiates to: periumbilical region  anorexia: No  arthralgias: No  belching: No  constipation: Yes  diarrhea: Yes  dysuria: No  fever: No  flatus: Yes  frequency: No  headaches: Yes  hematochezia: No  hematuria: No  melena: No  myalgias: Yes  nausea: No  weight loss: No  vomiting: No  Aggravated by: movement  Relieved by: certain positions

## 2024-08-12 NOTE — PATIENT INSTRUCTIONS
Take 2 tsp metamucil in 8 ounces of  water daily  Walking for half to  one hour a day helps fibromyalgia

## 2024-08-15 ENCOUNTER — TELEMEDICINE (OUTPATIENT)
Dept: PRIMARY CARE | Facility: CLINIC | Age: 59
End: 2024-08-15
Payer: COMMERCIAL

## 2024-08-15 ENCOUNTER — TELEPHONE (OUTPATIENT)
Dept: PRIMARY CARE | Facility: CLINIC | Age: 59
End: 2024-08-15

## 2024-08-15 VITALS — TEMPERATURE: 97.9 F

## 2024-08-15 DIAGNOSIS — S61.218A: ICD-10-CM

## 2024-08-15 DIAGNOSIS — R09.A2 GLOBUS SENSATION: Primary | ICD-10-CM

## 2024-08-15 NOTE — TELEPHONE ENCOUNTER
Pt called stating she has a concern due to a cut on her finger that had been exposed to her cat's litter. She stated she was cleaning and there was cat feces and urine in the box and she was not sure if she is needing to have a tetanus shot of any kind to help with her concern. She was advised her most recent shot was 2013 per her immunization chart. Pt also is aware that the she should wash wound with soap and water. She requested a virtual to discuss her concern as she is feeling a tetanus shot is needed for this issue and is scheduled for this afternoon.

## 2024-08-15 NOTE — PROGRESS NOTES
Subjective   Patient ID: Mady Chapa is a 59 y.o. female who presents for video visit finger cut    HPI   Patient was trying to get batteries out of a remote a week ago with a knife. She developed a tiny cut on her right index knuckle. She treated with neosporin and a bandaid. Today she was cleaning a cat litter box and it got a bit dirty. She was worried she might need a tetanus shot.  Last tetanus vaccine 8/7/22.  Review of Systems  Throat feels like there is something in it.  Objective   Temp 97.9    Physical Exam  Healed scab right index finger; finger is clean and dry.  Assessment/Plan   Problem List Items Addressed This Visit             ICD-10-CM    Globus sensation - Primary R09.A2    Cut of skin of index finger S61.218A

## 2024-08-29 ENCOUNTER — TELEPHONE (OUTPATIENT)
Dept: PRIMARY CARE | Facility: CLINIC | Age: 59
End: 2024-08-29
Payer: COMMERCIAL

## 2024-08-30 ENCOUNTER — OFFICE VISIT (OUTPATIENT)
Dept: PRIMARY CARE | Facility: CLINIC | Age: 59
End: 2024-08-30
Payer: COMMERCIAL

## 2024-08-30 VITALS
BODY MASS INDEX: 25.65 KG/M2 | OXYGEN SATURATION: 98 % | WEIGHT: 127 LBS | DIASTOLIC BLOOD PRESSURE: 70 MMHG | HEART RATE: 67 BPM | SYSTOLIC BLOOD PRESSURE: 144 MMHG

## 2024-08-30 DIAGNOSIS — K58.9 IRRITABLE BOWEL SYNDROME, UNSPECIFIED TYPE: ICD-10-CM

## 2024-08-30 DIAGNOSIS — M79.7 FIBROMYALGIA: ICD-10-CM

## 2024-08-30 DIAGNOSIS — F41.9 ANXIETY: Primary | ICD-10-CM

## 2024-08-30 DIAGNOSIS — M25.552 LEFT HIP PAIN: ICD-10-CM

## 2024-08-30 PROBLEM — J06.9 URI (UPPER RESPIRATORY INFECTION): Status: RESOLVED | Noted: 2024-04-12 | Resolved: 2024-08-30

## 2024-08-30 PROCEDURE — 99213 OFFICE O/P EST LOW 20 MIN: CPT | Performed by: PEDIATRICS

## 2024-08-30 RX ORDER — BUSPIRONE HYDROCHLORIDE 7.5 MG/1
7.5 TABLET ORAL 2 TIMES DAILY
Qty: 14 TABLET | Refills: 0 | Status: SHIPPED | OUTPATIENT
Start: 2024-08-30 | End: 2025-08-30

## 2024-08-30 RX ORDER — DICLOFENAC SODIUM 10 MG/G
4 GEL TOPICAL 4 TIMES DAILY
Qty: 100 G | Refills: 1 | Status: SHIPPED | OUTPATIENT
Start: 2024-08-30

## 2024-08-30 RX ORDER — BUSPIRONE HYDROCHLORIDE 15 MG/1
15 TABLET ORAL 2 TIMES DAILY
Qty: 60 TABLET | Refills: 1 | Status: SHIPPED | OUTPATIENT
Start: 2024-08-30 | End: 2025-08-30

## 2024-08-30 ASSESSMENT — ENCOUNTER SYMPTOMS
CHEST TIGHTNESS: 0
WOUND: 0
DIFFICULTY URINATING: 0
PALPITATIONS: 0
UNEXPECTED WEIGHT CHANGE: 0
BLOOD IN STOOL: 0
APPETITE CHANGE: 0
ABDOMINAL DISTENTION: 0
VOMITING: 0
DYSURIA: 0
COUGH: 0
DIZZINESS: 0
FEVER: 0
NAUSEA: 0
RHINORRHEA: 0
ARTHRALGIAS: 1
SORE THROAT: 0
FATIGUE: 0
NUMBNESS: 0
SHORTNESS OF BREATH: 0
CONSTIPATION: 0
DIARRHEA: 1
ABDOMINAL PAIN: 1
CHILLS: 0
EYE PAIN: 0

## 2024-08-30 NOTE — PROGRESS NOTES
Subjective   Patient ID: Mady Chapa is a 59 y.o. female who presents for hip pain that worsens when she walks or sits up.     HPI   Has left hip pain that worsened yesterday. Has had previous episodes and had an MRI that showed minor arthritis. Took tylenol and bentyl and said it helped the pain a little. Walking can make the pain worse. She rates it a 9/10 pain and is worried it was her appendix (something she is always worried about). Also points out some pain in LLQ around femoral/inguinal area.     Review of Systems   Constitutional:  Negative for appetite change, chills, fatigue, fever and unexpected weight change.   HENT:  Negative for congestion, ear pain, hearing loss, rhinorrhea and sore throat.    Eyes:  Negative for pain.   Respiratory:  Negative for cough, chest tightness and shortness of breath.    Cardiovascular:  Negative for chest pain and palpitations.   Gastrointestinal:  Positive for abdominal pain and diarrhea. Negative for abdominal distention, blood in stool, constipation, nausea and vomiting.   Genitourinary:  Negative for difficulty urinating and dysuria.   Musculoskeletal:  Positive for arthralgias.   Skin:  Negative for rash and wound.   Neurological:  Negative for dizziness and numbness.       Objective   /70   Pulse 67   Wt 57.6 kg (127 lb)   SpO2 98%   BMI 25.65 kg/m²     Physical Exam  Lungs clear  Heart RRR  Abd benign;  Good ROM both hips;   Mildly tender left lower back  Assessment/Plan   Problem List Items Addressed This Visit       Anxiety - Primary    Relevant Medications    busPIRone (Buspar) 7.5 mg tablet    busPIRone (Buspar) 15 mg tablet    Fibromyalgia    Current Assessment & Plan     Has had fibromyalgia pain on her lower left hip, takes tylenol          Relevant Medications    lidocaine (XYLOCAINE) 4 % gel gel    diclofenac sodium (Voltaren) 1 % gel    Irritable bowel syndrome    Current Assessment & Plan     Has pepcid and bentol, not medications for acute  flares         Left hip pain    Relevant Medications    lidocaine (XYLOCAINE) 4 % gel gel    diclofenac sodium (Voltaren) 1 % gel

## 2024-09-03 DIAGNOSIS — F41.9 ANXIETY: ICD-10-CM

## 2024-09-04 ENCOUNTER — APPOINTMENT (OUTPATIENT)
Dept: PRIMARY CARE | Facility: CLINIC | Age: 59
End: 2024-09-04
Payer: COMMERCIAL

## 2024-09-04 RX ORDER — BUSPIRONE HYDROCHLORIDE 15 MG/1
15 TABLET ORAL 2 TIMES DAILY
Qty: 180 TABLET | Refills: 1 | OUTPATIENT
Start: 2024-09-04

## 2024-09-05 ENCOUNTER — APPOINTMENT (OUTPATIENT)
Dept: PRIMARY CARE | Facility: CLINIC | Age: 59
End: 2024-09-05
Payer: COMMERCIAL

## 2024-09-20 ENCOUNTER — APPOINTMENT (OUTPATIENT)
Dept: AUDIOLOGY | Facility: CLINIC | Age: 59
End: 2024-09-20
Payer: COMMERCIAL

## 2024-09-20 ENCOUNTER — APPOINTMENT (OUTPATIENT)
Dept: OTOLARYNGOLOGY | Facility: CLINIC | Age: 59
End: 2024-09-20
Payer: COMMERCIAL

## 2024-09-27 ENCOUNTER — APPOINTMENT (OUTPATIENT)
Dept: AUDIOLOGY | Facility: CLINIC | Age: 59
End: 2024-09-27
Payer: COMMERCIAL

## 2024-09-27 ENCOUNTER — APPOINTMENT (OUTPATIENT)
Dept: OTOLARYNGOLOGY | Facility: CLINIC | Age: 59
End: 2024-09-27
Payer: COMMERCIAL

## 2024-10-10 ENCOUNTER — APPOINTMENT (OUTPATIENT)
Dept: PRIMARY CARE | Facility: CLINIC | Age: 59
End: 2024-10-10
Payer: COMMERCIAL

## 2024-10-11 ENCOUNTER — APPOINTMENT (OUTPATIENT)
Dept: AUDIOLOGY | Facility: CLINIC | Age: 59
End: 2024-10-11
Payer: COMMERCIAL

## 2024-10-11 ENCOUNTER — APPOINTMENT (OUTPATIENT)
Dept: OTOLARYNGOLOGY | Facility: CLINIC | Age: 59
End: 2024-10-11
Payer: COMMERCIAL

## 2024-11-14 ENCOUNTER — APPOINTMENT (OUTPATIENT)
Dept: PRIMARY CARE | Facility: CLINIC | Age: 59
End: 2024-11-14
Payer: COMMERCIAL

## 2024-11-15 DIAGNOSIS — K21.9 GASTROESOPHAGEAL REFLUX DISEASE, UNSPECIFIED WHETHER ESOPHAGITIS PRESENT: Primary | ICD-10-CM

## 2024-11-16 RX ORDER — FAMOTIDINE 40 MG/1
TABLET, FILM COATED ORAL
Qty: 90 TABLET | Refills: 3 | Status: SHIPPED | OUTPATIENT
Start: 2024-11-16

## 2024-11-19 ENCOUNTER — OFFICE VISIT (OUTPATIENT)
Dept: PRIMARY CARE | Facility: CLINIC | Age: 59
End: 2024-11-19
Payer: COMMERCIAL

## 2024-11-19 DIAGNOSIS — R07.9 CHEST PAIN, UNSPECIFIED TYPE: Primary | ICD-10-CM

## 2024-11-19 PROCEDURE — 99213 OFFICE O/P EST LOW 20 MIN: CPT | Performed by: PEDIATRICS

## 2024-11-19 PROCEDURE — 93000 ELECTROCARDIOGRAM COMPLETE: CPT | Performed by: PEDIATRICS

## 2024-11-20 ENCOUNTER — APPOINTMENT (OUTPATIENT)
Dept: OTOLARYNGOLOGY | Facility: CLINIC | Age: 59
End: 2024-11-20
Payer: COMMERCIAL

## 2024-11-20 ENCOUNTER — APPOINTMENT (OUTPATIENT)
Dept: AUDIOLOGY | Facility: CLINIC | Age: 59
End: 2024-11-20
Payer: COMMERCIAL

## 2024-11-20 VITALS
DIASTOLIC BLOOD PRESSURE: 73 MMHG | SYSTOLIC BLOOD PRESSURE: 142 MMHG | WEIGHT: 134 LBS | HEART RATE: 53 BPM | BODY MASS INDEX: 27.06 KG/M2

## 2024-11-20 PROBLEM — R07.9 CHEST PAIN: Status: ACTIVE | Noted: 2023-03-17

## 2024-11-20 NOTE — PROGRESS NOTES
Subjective   Patient ID: Mady Chapa is a 59 y.o. female who presents for chest pain    HPI   Patient had a few minutes of chest pain this morning which was sharp and is requesting an EKG. Touching the area did not make it hurt; She also has been having heartburn. Famotidine recently prescribed. She states her upper back hurt a bit too.  Review of Systems    Objective   /73   Pulse 53   Wt 60.8 kg (134 lb)   BMI 27.06 kg/m²     Physical Exam  Lungs clear  Heart RRR  nontender  Assessment/Plan   Problem List Items Addressed This Visit             ICD-10-CM    Chest pain - Primary R07.9    Relevant Orders    ECG 12 lead (Clinic Performed) (Completed)   Patient reassured

## 2024-11-27 ENCOUNTER — APPOINTMENT (OUTPATIENT)
Dept: PRIMARY CARE | Facility: CLINIC | Age: 59
End: 2024-11-27
Payer: COMMERCIAL

## 2024-12-04 ENCOUNTER — HOSPITAL ENCOUNTER (EMERGENCY)
Facility: HOSPITAL | Age: 59
Discharge: HOME | End: 2024-12-04
Attending: STUDENT IN AN ORGANIZED HEALTH CARE EDUCATION/TRAINING PROGRAM
Payer: COMMERCIAL

## 2024-12-04 ENCOUNTER — APPOINTMENT (OUTPATIENT)
Dept: RADIOLOGY | Facility: HOSPITAL | Age: 59
End: 2024-12-04
Payer: COMMERCIAL

## 2024-12-04 VITALS
HEIGHT: 59 IN | TEMPERATURE: 98.6 F | RESPIRATION RATE: 18 BRPM | BODY MASS INDEX: 26.67 KG/M2 | WEIGHT: 132.28 LBS | HEART RATE: 56 BPM | OXYGEN SATURATION: 99 % | SYSTOLIC BLOOD PRESSURE: 125 MMHG | DIASTOLIC BLOOD PRESSURE: 72 MMHG

## 2024-12-04 DIAGNOSIS — E55.9 VITAMIN D DEFICIENCY: Primary | ICD-10-CM

## 2024-12-04 DIAGNOSIS — S09.90XA INJURY OF HEAD, INITIAL ENCOUNTER: Primary | ICD-10-CM

## 2024-12-04 DIAGNOSIS — M54.2 NECK PAIN: ICD-10-CM

## 2024-12-04 PROCEDURE — 99284 EMERGENCY DEPT VISIT MOD MDM: CPT | Mod: 25 | Performed by: STUDENT IN AN ORGANIZED HEALTH CARE EDUCATION/TRAINING PROGRAM

## 2024-12-04 PROCEDURE — 72125 CT NECK SPINE W/O DYE: CPT | Performed by: RADIOLOGY

## 2024-12-04 PROCEDURE — 72125 CT NECK SPINE W/O DYE: CPT

## 2024-12-04 PROCEDURE — 70450 CT HEAD/BRAIN W/O DYE: CPT

## 2024-12-04 PROCEDURE — 12011 RPR F/E/E/N/L/M 2.5 CM/<: CPT

## 2024-12-04 PROCEDURE — 70450 CT HEAD/BRAIN W/O DYE: CPT | Performed by: RADIOLOGY

## 2024-12-04 ASSESSMENT — COLUMBIA-SUICIDE SEVERITY RATING SCALE - C-SSRS
2. HAVE YOU ACTUALLY HAD ANY THOUGHTS OF KILLING YOURSELF?: NO
6. HAVE YOU EVER DONE ANYTHING, STARTED TO DO ANYTHING, OR PREPARED TO DO ANYTHING TO END YOUR LIFE?: NO
1. IN THE PAST MONTH, HAVE YOU WISHED YOU WERE DEAD OR WISHED YOU COULD GO TO SLEEP AND NOT WAKE UP?: NO

## 2024-12-04 ASSESSMENT — PAIN - FUNCTIONAL ASSESSMENT: PAIN_FUNCTIONAL_ASSESSMENT: 0-10

## 2024-12-04 ASSESSMENT — PAIN SCALES - GENERAL: PAINLEVEL_OUTOF10: 0 - NO PAIN

## 2024-12-04 NOTE — ED PROVIDER NOTES
HPI   Chief Complaint   Patient presents with    Head Laceration     Post fall. Lac to her eyebrow       Patient presents with head injury.  She states that she tripped on her shoelace, hitting her head on the wall.  She did not lose consciousness.  She does not take any blood thinning medications.  She reports some pain in her neck.              Patient History   Past Medical History:   Diagnosis Date    Blindness of right eye     Body mass index (BMI) 24.0-24.9, adult 10/27/2021    Body mass index (BMI) of 24.0 to 24.9 in adult    CTS (carpal tunnel syndrome) 2022    Depression 1976?    Fibroid     Kidney stone 2018    Low platelet count (CMS-HCC)     Lumbago with sciatica, right side 01/08/2019    Acute right-sided low back pain with right-sided sciatica    Migraine 1990    Other chest pain 03/13/2020    Chest wall pain    Other specified personal risk factors, not elsewhere classified 10/04/2022    At risk for allergic reaction to medication    Pain in left foot 04/13/2021    Left foot pain    Pain in throat 10/25/2018    Throat pain    Personal history of other diseases of the digestive system 06/20/2022    History of constipation    Personal history of other diseases of the musculoskeletal system and connective tissue 10/20/2021    History of neck pain    Personal history of other diseases of the respiratory system 04/06/2020    History of pharyngitis    Personal history of other diseases of the respiratory system 03/24/2020    History of sore throat    Preglaucoma, unspecified, 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    Superficial foreign body, left foot, sequela 01/17/2022    Foreign body in left foot, sequela    Xerosis cutis 06/26/2020    Dry skin dermatitis     Past Surgical History:   Procedure Laterality Date    ADENOIDECTOMY  07/31/2013    Adenoidectomy    COLONOSCOPY  07/31/2013    Complete Colonoscopy    EYE SURGERY  07/31/2013    Eye Surgery     Family History    Problem Relation Name Age of Onset    Dementia Mother Ruthie     Kidney failure Mother Ruthie     Hypertension Mother Ruthie     Diabetes Mother Ruthie     Hypertension Father Neptali     Hyperlipidemia Father Neptali     Other (Open heart surgery) Father Neptali     Breast cancer Father Neptali     COPD Father Neptali     Heart disease Father Neptali     Hypertension Sister Tamika     Hyperlipidemia Sister Tamika     Breast cancer Sister Tamika     Breast cancer Father's Brother Jon     Heart disease Maternal Grandfather Don't Remember     Breast cancer Paternal Grandmother Don't  remember     Arthritis Sister Carlota     Mental illness Mother's Sister Jerri      Social History     Tobacco Use    Smoking status: Never     Passive exposure: Never    Smokeless tobacco: Never   Vaping Use    Vaping status: Never Used   Substance Use Topics    Alcohol use: Never    Drug use: Never       Physical Exam   ED Triage Vitals [12/04/24 1036]   Temperature Heart Rate Respirations BP   37.6 °C (99.7 °F) 56 18 125/72      Pulse Ox Temp Source Heart Rate Source Patient Position   99 % Oral Monitor Lying      BP Location FiO2 (%)     Right arm --       Physical Exam  HENT:      Head:      Comments: Shallow laceration, several millimeters underneath the left eyebrow.  With tension on the skin, it does not come apart.  Neck:      Comments: There is midline cervical spine tenderness to palpation, no step-offs.  Neurological:      Mental Status: She is alert.      Comments: Patient is awake and alert, answers questions appropriately.  Able to ambulate as usual.           ED Course & MDM   Diagnoses as of 12/04/24 1100   Injury of head, initial encounter   Neck pain                 No data recorded     Entriken Coma Scale Score: 15 (12/04/24 1039 : Joy Story, TRICE)                           Medical Decision Making  Head CT and cervical spine CTs without acute findings.  Patient was given referral for spine surgery given her central canal stenosis  with mild ventral cord compression seen on her cervical spine CT.  Glue was placed on patient's laceration by nursing.  Patient advised to follow-up with primary care physician.  Return precautions given for any worsening symptoms.  Parts of this chart were completed with dictation software, please excuse any errors in transcription.        Procedure  Procedures     Cordell Ernandez MD  12/04/24 1139

## 2024-12-04 NOTE — DISCHARGE INSTRUCTIONS
The CAT scan of your head and neck do not show any emergencies.  You should follow-up with your primary care physician.  Return to the emergency department with any worsening symptoms.    It is important to remember that your care does not end here and you must continue to monitor your condition closely. Please return to the emergency department for any worsening or concerning signs or symptoms as directed by our conversations and the discharge instructions. If you do not have a doctor please contact the referral number on your discharge instructions. Please contact any physician specialists provided in your discharge notes as it is very important to follow up with them regarding your condition. If you are unable to reach the physicians provided, please come back to the Emergency Department at any time.    Return to emergency room without delay for ANY new or worsening pains or for any other symptoms or concerns.  Return with worsening pains, nausea, vomiting, trouble breathing, palpitations, shortness of breath, inability to pass stool or urine, loss of control of stool or urine, any numbness or tingling (that is not normal for you), uncontrolled fevers, the passing of blood or other material in stool or urine, rashes, pains or for any other symptoms or concerns you may have.  You are always welcome to return to the ER at any time for any reason or for any other concerns you may have.

## 2024-12-06 ENCOUNTER — APPOINTMENT (OUTPATIENT)
Dept: PRIMARY CARE | Facility: CLINIC | Age: 59
End: 2024-12-06
Payer: COMMERCIAL

## 2024-12-09 ENCOUNTER — APPOINTMENT (OUTPATIENT)
Dept: PRIMARY CARE | Facility: CLINIC | Age: 59
End: 2024-12-09
Payer: COMMERCIAL

## 2024-12-09 VITALS
BODY MASS INDEX: 27.57 KG/M2 | WEIGHT: 136.5 LBS | TEMPERATURE: 97.5 F | DIASTOLIC BLOOD PRESSURE: 70 MMHG | OXYGEN SATURATION: 96 % | HEART RATE: 51 BPM | SYSTOLIC BLOOD PRESSURE: 129 MMHG

## 2024-12-09 DIAGNOSIS — E53.8 VITAMIN B12 DEFICIENCY: Primary | ICD-10-CM

## 2024-12-09 DIAGNOSIS — R07.9 CHEST PAIN, UNSPECIFIED TYPE: ICD-10-CM

## 2024-12-09 DIAGNOSIS — E78.00 HIGH BLOOD CHOLESTEROL: ICD-10-CM

## 2024-12-09 DIAGNOSIS — K59.00 CONSTIPATION, UNSPECIFIED CONSTIPATION TYPE: ICD-10-CM

## 2024-12-09 DIAGNOSIS — D69.6 THROMBOCYTOPENIA (CMS-HCC): ICD-10-CM

## 2024-12-09 DIAGNOSIS — R06.83 SNORING: ICD-10-CM

## 2024-12-09 DIAGNOSIS — G43.809 OTHER MIGRAINE WITHOUT STATUS MIGRAINOSUS, NOT INTRACTABLE: ICD-10-CM

## 2024-12-09 DIAGNOSIS — M79.7 FIBROMYALGIA: ICD-10-CM

## 2024-12-09 DIAGNOSIS — E55.9 VITAMIN D DEFICIENCY: ICD-10-CM

## 2024-12-09 DIAGNOSIS — K76.0 FATTY LIVER: ICD-10-CM

## 2024-12-09 DIAGNOSIS — H40.9 GLAUCOMA, UNSPECIFIED GLAUCOMA TYPE, UNSPECIFIED LATERALITY: ICD-10-CM

## 2024-12-09 DIAGNOSIS — K82.8 GALLBLADDER SLUDGE: ICD-10-CM

## 2024-12-09 DIAGNOSIS — R93.89 ABNORMAL CT OF THE CHEST: ICD-10-CM

## 2024-12-09 DIAGNOSIS — R73.03 PREDIABETES: ICD-10-CM

## 2024-12-09 DIAGNOSIS — K21.9 GASTROESOPHAGEAL REFLUX DISEASE, UNSPECIFIED WHETHER ESOPHAGITIS PRESENT: ICD-10-CM

## 2024-12-09 DIAGNOSIS — F41.9 ANXIETY: ICD-10-CM

## 2024-12-09 PROBLEM — J40 BRONCHITIS: Status: RESOLVED | Noted: 2024-04-17 | Resolved: 2024-12-09

## 2024-12-09 PROBLEM — R29.818 SUSPECTED SLEEP APNEA: Status: RESOLVED | Noted: 2023-10-20 | Resolved: 2024-12-09

## 2024-12-09 PROBLEM — J02.9 PHARYNGITIS: Status: RESOLVED | Noted: 2024-07-30 | Resolved: 2024-12-09

## 2024-12-09 PROBLEM — S61.218A: Status: RESOLVED | Noted: 2024-08-15 | Resolved: 2024-12-09

## 2024-12-09 LAB
25(OH)D3 SERPL-MCNC: 25 NG/ML (ref 30–100)
ANION GAP SERPL CALC-SCNC: 15 MMOL/L (ref 10–20)
BUN SERPL-MCNC: 11 MG/DL (ref 7–18)
CALCIUM SERPL-MCNC: 9.2 MG/DL (ref 8.5–10.1)
CHLORIDE SERPL-SCNC: 103 MMOL/L (ref 98–107)
CHOLEST SERPL-MCNC: 132 MG/DL (ref 0–199)
CHOLESTEROL/HDL RATIO: 2.6 (ref 4.2–7)
CO2 SERPL-SCNC: 27 MMOL/L (ref 21–32)
CREAT SERPL-MCNC: 0.81 MG/DL (ref 0.6–1.1)
EGFRCR SERPLBLD CKD-EPI 2021: 84 ML/MIN/1.73M*2
GLUCOSE SERPL-MCNC: 90 MG/DL (ref 74–100)
HDLC SERPL-MCNC: 50 MG/DL (ref 40–59)
IS PATIENT FASTING: YES
LDLC SERPL DIRECT ASSAY-MCNC: 67 MG/DL (ref 0–100)
POTASSIUM SERPL-SCNC: 3.8 MMOL/L (ref 3.5–5.1)
SODIUM SERPL-SCNC: 141 MMOL/L (ref 136–145)
TRIGL SERPL-MCNC: 87 MG/DL

## 2024-12-09 PROCEDURE — G2211 COMPLEX E/M VISIT ADD ON: HCPCS | Performed by: PEDIATRICS

## 2024-12-09 PROCEDURE — 99214 OFFICE O/P EST MOD 30 MIN: CPT | Performed by: PEDIATRICS

## 2024-12-09 PROCEDURE — 82306 VITAMIN D 25 HYDROXY: CPT | Performed by: PEDIATRICS

## 2024-12-09 PROCEDURE — 83721 ASSAY OF BLOOD LIPOPROTEIN: CPT | Performed by: PEDIATRICS

## 2024-12-09 PROCEDURE — 80061 LIPID PANEL: CPT | Performed by: PEDIATRICS

## 2024-12-09 PROCEDURE — 80048 BASIC METABOLIC PNL TOTAL CA: CPT | Performed by: PEDIATRICS

## 2024-12-09 RX ORDER — LANOLIN ALCOHOL/MO/W.PET/CERES
1000 CREAM (GRAM) TOPICAL DAILY
Qty: 90 TABLET | Refills: 3 | Status: SHIPPED | OUTPATIENT
Start: 2024-12-09 | End: 2025-12-09

## 2024-12-09 RX ORDER — FAMOTIDINE 40 MG/1
TABLET, FILM COATED ORAL
Qty: 90 TABLET | Refills: 3 | Status: SHIPPED | OUTPATIENT
Start: 2024-12-09

## 2024-12-09 ASSESSMENT — PAIN SCALES - GENERAL: PAINLEVEL_OUTOF10: 4
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
## 2024-12-10 ENCOUNTER — TELEPHONE (OUTPATIENT)
Dept: PRIMARY CARE | Facility: CLINIC | Age: 59
End: 2024-12-10
Payer: COMMERCIAL

## 2024-12-10 RX ORDER — VIT C/E/ZN/COPPR/LUTEIN/ZEAXAN 250MG-90MG
25 CAPSULE ORAL DAILY
Qty: 90 CAPSULE | Refills: 3 | Status: SHIPPED | OUTPATIENT
Start: 2024-12-10

## 2024-12-10 NOTE — TELEPHONE ENCOUNTER
Spoke with patient who verbalized understanding of the findings.     ----- Message from Sil Pike sent at 12/10/2024  5:57 AM EST -----  Vitamin D is low but the other labs are good including sugar and cholesterol. I sent in vitamin D to [facility].

## 2024-12-17 DIAGNOSIS — E55.9 VITAMIN D DEFICIENCY: ICD-10-CM

## 2024-12-17 RX ORDER — VIT C/E/ZN/COPPR/LUTEIN/ZEAXAN 250MG-90MG
25 CAPSULE ORAL DAILY
Qty: 90 CAPSULE | Refills: 3 | Status: SHIPPED | OUTPATIENT
Start: 2024-12-17

## 2024-12-18 ENCOUNTER — APPOINTMENT (OUTPATIENT)
Dept: PRIMARY CARE | Facility: CLINIC | Age: 59
End: 2024-12-18
Payer: COMMERCIAL

## 2025-01-09 ENCOUNTER — APPOINTMENT (OUTPATIENT)
Dept: PRIMARY CARE | Facility: CLINIC | Age: 60
End: 2025-01-09
Payer: COMMERCIAL

## 2025-01-17 ENCOUNTER — APPOINTMENT (OUTPATIENT)
Dept: HEMATOLOGY/ONCOLOGY | Facility: CLINIC | Age: 60
End: 2025-01-17
Payer: COMMERCIAL

## 2025-01-22 ENCOUNTER — APPOINTMENT (OUTPATIENT)
Dept: CARDIOLOGY | Facility: CLINIC | Age: 60
End: 2025-01-22
Payer: COMMERCIAL

## 2025-01-22 ENCOUNTER — APPOINTMENT (OUTPATIENT)
Dept: PRIMARY CARE | Facility: CLINIC | Age: 60
End: 2025-01-22
Payer: COMMERCIAL

## 2025-01-22 ENCOUNTER — TELEPHONE (OUTPATIENT)
Dept: PRIMARY CARE | Facility: CLINIC | Age: 60
End: 2025-01-22

## 2025-01-22 DIAGNOSIS — R07.9 CHEST PAIN, UNSPECIFIED TYPE: Primary | ICD-10-CM

## 2025-01-22 PROCEDURE — 1036F TOBACCO NON-USER: CPT

## 2025-01-22 PROCEDURE — G2211 COMPLEX E/M VISIT ADD ON: HCPCS

## 2025-01-22 PROCEDURE — 99204 OFFICE O/P NEW MOD 45 MIN: CPT

## 2025-01-22 NOTE — TELEPHONE ENCOUNTER
Patient is calling stating that she was wondering if she can receive an order for a sleep study and a doctor that could help with sleep. She stated she requested [phone].

## 2025-01-22 NOTE — PATIENT INSTRUCTIONS
-Regular stress test  -Complete the pending sleep study and share the results with the cardiology office.  -Encourage adherence to medications and recommended lifestyle modifications.  -Follow-Up: With the results of the stress test. Return sooner if new or concerning symptoms develop, such as chest pain, dyspnea, or palpitations.
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
## 2025-01-24 ENCOUNTER — APPOINTMENT (OUTPATIENT)
Dept: OTOLARYNGOLOGY | Facility: CLINIC | Age: 60
End: 2025-01-24
Payer: COMMERCIAL

## 2025-01-30 ENCOUNTER — OFFICE VISIT (OUTPATIENT)
Dept: PRIMARY CARE | Facility: CLINIC | Age: 60
End: 2025-01-30
Payer: COMMERCIAL

## 2025-01-30 ENCOUNTER — APPOINTMENT (OUTPATIENT)
Dept: PRIMARY CARE | Facility: CLINIC | Age: 60
End: 2025-01-30
Payer: COMMERCIAL

## 2025-01-30 ENCOUNTER — TELEPHONE (OUTPATIENT)
Dept: PRIMARY CARE | Facility: CLINIC | Age: 60
End: 2025-01-30

## 2025-01-30 VITALS — SYSTOLIC BLOOD PRESSURE: 134 MMHG | DIASTOLIC BLOOD PRESSURE: 78 MMHG | HEART RATE: 52 BPM

## 2025-01-30 DIAGNOSIS — E53.8 VITAMIN B12 DEFICIENCY: ICD-10-CM

## 2025-01-30 DIAGNOSIS — M79.18 MUSCULOSKELETAL PAIN: Primary | ICD-10-CM

## 2025-01-30 DIAGNOSIS — E78.5 HYPERLIPIDEMIA, UNSPECIFIED HYPERLIPIDEMIA TYPE: ICD-10-CM

## 2025-01-30 PROCEDURE — 1036F TOBACCO NON-USER: CPT | Performed by: PEDIATRICS

## 2025-01-30 PROCEDURE — 99212 OFFICE O/P EST SF 10 MIN: CPT | Performed by: PEDIATRICS

## 2025-01-30 ASSESSMENT — PAIN SCALES - GENERAL: PAINLEVEL_OUTOF10: 5

## 2025-01-30 NOTE — PROGRESS NOTES
Subjective   Patient ID: Mady Chapa is a 59 y.o. female who presents for chest pain, shoulder and back pain    HPI   Patient noted chest pain after having sex two days ago; chest wall was tender to touch; also felt mid back and left trapezius pain  Review of Systems    Objective   /78   Pulse 52     Physical Exam  Left chest wall tender as is left trapezius and upper back to left of midline.  Assessment/Plan   Problem List Items Addressed This Visit             ICD-10-CM    Musculoskeletal pain - Primary M79.18

## 2025-01-30 NOTE — TELEPHONE ENCOUNTER
Per verbal Dr. Pike wants patient to call office to schedule appointment, patient was advised and she was scheduled to come in today at 11:30am.

## 2025-01-31 ENCOUNTER — TELEPHONE (OUTPATIENT)
Dept: PRIMARY CARE | Facility: CLINIC | Age: 60
End: 2025-01-31
Payer: COMMERCIAL
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
## 2025-02-04 ENCOUNTER — HOSPITAL ENCOUNTER (OUTPATIENT)
Dept: CARDIOLOGY | Facility: HOSPITAL | Age: 60
Discharge: HOME | End: 2025-02-04
Payer: COMMERCIAL

## 2025-02-04 ENCOUNTER — TELEPHONE (OUTPATIENT)
Dept: PRIMARY CARE | Facility: CLINIC | Age: 60
End: 2025-02-04

## 2025-02-04 DIAGNOSIS — R07.9 CHEST PAIN, UNSPECIFIED TYPE: ICD-10-CM

## 2025-02-04 DIAGNOSIS — E53.8 VITAMIN B12 DEFICIENCY: ICD-10-CM

## 2025-02-04 PROCEDURE — 93016 CV STRESS TEST SUPVJ ONLY: CPT | Performed by: INTERNAL MEDICINE

## 2025-02-04 PROCEDURE — 93017 CV STRESS TEST TRACING ONLY: CPT

## 2025-02-04 PROCEDURE — 93018 CV STRESS TEST I&R ONLY: CPT | Performed by: INTERNAL MEDICINE

## 2025-02-04 RX ORDER — LANOLIN ALCOHOL/MO/W.PET/CERES
1000 CREAM (GRAM) TOPICAL DAILY
Qty: 90 TABLET | Refills: 3 | Status: SHIPPED | OUTPATIENT
Start: 2025-02-04 | End: 2026-02-04

## 2025-02-04 RX ORDER — LANOLIN ALCOHOL/MO/W.PET/CERES
1000 CREAM (GRAM) TOPICAL DAILY
Qty: 90 TABLET | Refills: 3 | Status: SHIPPED | OUTPATIENT
Start: 2025-02-04 | End: 2025-02-04 | Stop reason: SDUPTHER

## 2025-02-04 NOTE — TELEPHONE ENCOUNTER
Ms. Sammi called office with update that she had her stress test today and she advised that everything was fine. But she's currently having some chest wall pain and heart is beating fast.

## 2025-02-06 ENCOUNTER — TELEPHONE (OUTPATIENT)
Dept: CARDIOLOGY | Facility: HOSPITAL | Age: 60
End: 2025-02-06
Payer: COMMERCIAL

## 2025-02-06 ENCOUNTER — APPOINTMENT (OUTPATIENT)
Dept: CARDIOLOGY | Facility: HOSPITAL | Age: 60
End: 2025-02-06
Payer: COMMERCIAL

## 2025-02-06 NOTE — TELEPHONE ENCOUNTER
Patient called requesting to know if it is safe for her to do this next CT scan of the chest because she stated she previously had a scan on her brain a few months ago and was unsure if it is safe to do so many in a year. She stated she can be reached at [phone].

## 2025-02-10 ENCOUNTER — APPOINTMENT (OUTPATIENT)
Dept: CARDIOLOGY | Facility: HOSPITAL | Age: 60
End: 2025-02-10
Payer: COMMERCIAL

## 2025-02-10 ENCOUNTER — APPOINTMENT (OUTPATIENT)
Dept: RADIOLOGY | Facility: HOSPITAL | Age: 60
End: 2025-02-10
Payer: COMMERCIAL

## 2025-02-10 NOTE — TELEPHONE ENCOUNTER
Patient is calling requesting to know if the pain she feels when pressing under her breast is considered chest wall pain. She would like a call back to [phone].

## 2025-02-11 ENCOUNTER — APPOINTMENT (OUTPATIENT)
Dept: ORTHOPEDIC SURGERY | Facility: CLINIC | Age: 60
End: 2025-02-11
Payer: COMMERCIAL

## 2025-02-11 ENCOUNTER — APPOINTMENT (OUTPATIENT)
Dept: RADIOLOGY | Facility: HOSPITAL | Age: 60
End: 2025-02-11
Payer: COMMERCIAL

## 2025-02-12 ENCOUNTER — APPOINTMENT (OUTPATIENT)
Dept: RADIOLOGY | Facility: HOSPITAL | Age: 60
End: 2025-02-12
Payer: COMMERCIAL

## 2025-02-12 ENCOUNTER — TELEPHONE (OUTPATIENT)
Dept: PRIMARY CARE | Facility: CLINIC | Age: 60
End: 2025-02-12
Payer: COMMERCIAL

## 2025-02-12 NOTE — TELEPHONE ENCOUNTER
Patient is calling stating she slipped on a banana peel and hit the top part of her body. She stated she used her hands to stop the fall and she does not have bruises or cuts but she has pain in her hand and arm as well as leg. She stated she would like to know what to do for the pain but did not want to come in for the appointment.

## 2025-02-13 ENCOUNTER — APPOINTMENT (OUTPATIENT)
Dept: PRIMARY CARE | Facility: CLINIC | Age: 60
End: 2025-02-13
Payer: COMMERCIAL

## 2025-02-18 ENCOUNTER — APPOINTMENT (OUTPATIENT)
Dept: ORTHOPEDIC SURGERY | Facility: CLINIC | Age: 60
End: 2025-02-18
Payer: COMMERCIAL

## 2025-02-19 ENCOUNTER — TELEPHONE (OUTPATIENT)
Dept: PRIMARY CARE | Facility: CLINIC | Age: 60
End: 2025-02-19
Payer: COMMERCIAL

## 2025-02-19 NOTE — TELEPHONE ENCOUNTER
Mady called office with compliant of left ear pain after her migraine went away.She hasn't taken anything for the pain and would like to know what to do?  Do you want her to come in ?  No drainage.    Phone: [phone]

## 2025-02-20 ENCOUNTER — APPOINTMENT (OUTPATIENT)
Dept: CARDIOLOGY | Facility: HOSPITAL | Age: 60
End: 2025-02-20
Payer: COMMERCIAL

## 2025-02-20 ENCOUNTER — OFFICE VISIT (OUTPATIENT)
Dept: PRIMARY CARE | Facility: CLINIC | Age: 60
End: 2025-02-20
Payer: COMMERCIAL

## 2025-02-20 VITALS
WEIGHT: 141 LBS | HEART RATE: 58 BPM | OXYGEN SATURATION: 99 % | TEMPERATURE: 97 F | HEIGHT: 59 IN | DIASTOLIC BLOOD PRESSURE: 68 MMHG | SYSTOLIC BLOOD PRESSURE: 121 MMHG | BODY MASS INDEX: 28.43 KG/M2

## 2025-02-20 DIAGNOSIS — H92.01 OTALGIA OF RIGHT EAR: Primary | ICD-10-CM

## 2025-02-20 PROCEDURE — 99212 OFFICE O/P EST SF 10 MIN: CPT | Performed by: PEDIATRICS

## 2025-02-20 PROCEDURE — 1036F TOBACCO NON-USER: CPT | Performed by: PEDIATRICS

## 2025-02-20 PROCEDURE — 3008F BODY MASS INDEX DOCD: CPT | Performed by: PEDIATRICS

## 2025-02-20 ASSESSMENT — ENCOUNTER SYMPTOMS
DIFFICULTY URINATING: 0
NAUSEA: 0
SINUS PRESSURE: 0
SHORTNESS OF BREATH: 0
EYES NEGATIVE: 1
SINUS PAIN: 0
FEVER: 0
BACK PAIN: 0
DIZZINESS: 0
COUGH: 0
DYSURIA: 0
CONSTIPATION: 0
MYALGIAS: 0
FREQUENCY: 0
ABDOMINAL PAIN: 0
CHILLS: 0
PALPITATIONS: 0
APPETITE CHANGE: 0
DIARRHEA: 0
ARTHRALGIAS: 0
VOMITING: 0
FLANK PAIN: 0
HEADACHES: 1
LIGHT-HEADEDNESS: 0

## 2025-02-20 NOTE — PROGRESS NOTES
"Subjective   Patient ID: Mady Chapa is a 59 y.o. female who presents for Ear Pain.    HPI     Patient had a migraine yesterday morning, which lasted 3 hours, and resolved after taking her migraine medicine. Now she states she has been having ear pain since the migraine has resolved. The Ear pain comes and goes, is sharp and worse when she touches her ear. Denies hearing loss, Ear discharge, swelling, or dizziness. Patient has taken Tylenol, which has helped.     Patient also reports hitting her head on the ceiling of her friends car twice this morning. Denies LOC, headache, or trauma to the skull. Area that she hit her head is tender to the touch, rated pain 6/10.     Review of Systems   Constitutional:  Negative for appetite change, chills and fever.   HENT: Negative.  Negative for congestion, sinus pressure and sinus pain.    Eyes: Negative.    Respiratory:  Negative for cough and shortness of breath.    Cardiovascular:  Negative for chest pain and palpitations.   Gastrointestinal:  Negative for abdominal pain, constipation, diarrhea, nausea and vomiting.   Genitourinary:  Negative for difficulty urinating, dysuria, flank pain and frequency.   Musculoskeletal:  Negative for arthralgias, back pain and myalgias.   Neurological:  Positive for headaches. Negative for dizziness and light-headedness.       Objective   /68 (BP Location: Left arm, Patient Position: Sitting, BP Cuff Size: Adult)   Pulse 58   Temp 36.1 °C (97 °F) (Skin)   Ht 1.499 m (4' 11\")   Wt 64 kg (141 lb)   SpO2 99%   BMI 28.48 kg/m²     Physical Exam  Constitutional:       General: She is not in acute distress.     Appearance: Normal appearance.   HENT:      Head: Normocephalic and atraumatic. No contusion or laceration.      Comments: Mild Tenderness on palpation of top of head     Right Ear: Tympanic membrane, ear canal and external ear normal.      Left Ear: Tympanic membrane, ear canal and external ear normal.      Mouth/Throat:     "  Mouth: Mucous membranes are moist.      Pharynx: No oropharyngeal exudate or posterior oropharyngeal erythema.   Eyes:      Extraocular Movements: Extraocular movements intact.      Pupils: Pupils are equal, round, and reactive to light.   Cardiovascular:      Rate and Rhythm: Normal rate and regular rhythm.      Pulses: Normal pulses.      Heart sounds: Normal heart sounds. No murmur heard.  Pulmonary:      Effort: Pulmonary effort is normal.      Breath sounds: Normal breath sounds.   Musculoskeletal:      Cervical back: Normal range of motion.   Skin:     Capillary Refill: Capillary refill takes less than 2 seconds.   Neurological:      Mental Status: She is alert.         Assessment/Plan      Problem List Items Addressed This Visit    None  Visit Diagnoses       Otalgia of right ear    -  Primary

## 2025-02-21 ENCOUNTER — APPOINTMENT (OUTPATIENT)
Dept: PRIMARY CARE | Facility: CLINIC | Age: 60
End: 2025-02-21
Payer: COMMERCIAL

## 2025-02-22 ENCOUNTER — OFFICE VISIT (OUTPATIENT)
Dept: URGENT CARE | Age: 60
End: 2025-02-22
Payer: COMMERCIAL

## 2025-02-22 VITALS
BODY MASS INDEX: 27.42 KG/M2 | TEMPERATURE: 98.3 F | HEART RATE: 63 BPM | WEIGHT: 136 LBS | SYSTOLIC BLOOD PRESSURE: 154 MMHG | OXYGEN SATURATION: 99 % | DIASTOLIC BLOOD PRESSURE: 69 MMHG | HEIGHT: 59 IN | RESPIRATION RATE: 18 BRPM

## 2025-02-22 DIAGNOSIS — S91.312A FOOT LACERATION, LEFT, INITIAL ENCOUNTER: Primary | ICD-10-CM

## 2025-02-22 ASSESSMENT — ENCOUNTER SYMPTOMS
MYALGIAS: 0
WOUND: 1
WEAKNESS: 0
ARTHRALGIAS: 0
COLOR CHANGE: 0

## 2025-02-23 NOTE — PATIENT INSTRUCTIONS
Keep cut clean and dry, can take tylenol for pain.  Follow up with your primary provider in 3-5 days  No antibiotic given due to multiple allergic reactions to all of the antibiotics used for injury

## 2025-02-23 NOTE — PROGRESS NOTES
Subjective   Patient ID: Mady Chapa is a 59 y.o. female. They present today with a chief complaint of cut on foot (With leg pain).    History of Present Illness  58 YO F c/o laceration to the heel of her L foot, noticed yesterday, unsure how she cut it.  Tetanus was in 2022.          Past Medical History  Allergies as of 02/22/2025 - Reviewed 01/22/2025   Allergen Reaction Noted    Amitriptyline Angioedema 03/07/2023    Biotin Swelling 10/20/2023    Clindamycin Unknown 03/07/2023    Doxycycline Unknown 09/26/2022    Latex Other 03/07/2023    Metronidazole Diarrhea 08/25/2023    Nortriptyline Unknown 03/07/2023    Omeprazole Other 03/06/2023    Sulfamethoxazole-trimethoprim Unknown 06/08/2023    Cephalexin Rash 06/08/2023    Ciprofloxacin Rash 03/07/2023    Magnesium citrate Rash 06/08/2023    Nitrofurantoin monohyd/m-cryst Rash 03/07/2023    Penicillins Rash 03/07/2023    Sulfa (sulfonamide antibiotics) Rash and Unknown 03/07/2023       (Not in a hospital admission)       Past Medical History:   Diagnosis Date    Blindness of right eye     Body mass index (BMI) 24.0-24.9, adult 10/27/2021    Body mass index (BMI) of 24.0 to 24.9 in adult    CTS (carpal tunnel syndrome) 2022    Depression 1976?    Fibroid     Kidney stone 2018    Low platelet count (CMS-[facility])     Lumbago with sciatica, right side 01/08/2019    Acute right-sided low back pain with right-sided sciatica    Migraine 1990    Other chest pain 03/13/2020    Chest wall pain    Other specified personal risk factors, not elsewhere classified 10/04/2022    At risk for allergic reaction to medication    Pain in left foot 04/13/2021    Left foot pain    Pain in throat 10/25/2018    Throat pain    Personal history of other diseases of the digestive system 06/20/2022    History of constipation    Personal history of other diseases of the musculoskeletal system and connective tissue 10/20/2021    History of neck pain    Personal history of other diseases of the  "respiratory system 04/06/2020    History of pharyngitis    Personal history of other diseases of the respiratory system 03/24/2020    History of sore throat    Preglaucoma, unspecified, 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    Superficial foreign body, left foot, sequela 01/17/2022    Foreign body in left foot, sequela    Xerosis cutis 06/26/2020    Dry skin dermatitis       Past Surgical History:   Procedure Laterality Date    ADENOIDECTOMY  07/31/2013    Adenoidectomy    COLONOSCOPY  07/31/2013    Complete Colonoscopy    EYE SURGERY  07/31/2013    Eye Surgery        reports that she has never smoked. She has never been exposed to tobacco smoke. She has never used smokeless tobacco. She reports that she does not drink alcohol and does not use drugs.    Review of Systems  Review of Systems   Musculoskeletal:  Negative for arthralgias and myalgias.   Skin:  Positive for wound. Negative for color change.   Neurological:  Negative for weakness.                                  Objective    Vitals:    02/22/25 1923   BP: 154/69   Pulse: 63   Resp: 18   Temp: 36.8 °C (98.3 °F)   TempSrc: Oral   SpO2: 99%   Weight: 61.7 kg (136 lb)   Height: 1.499 m (4' 11\")     No LMP recorded. Patient is perimenopausal.    Physical Exam  Vitals and nursing note reviewed.   Constitutional:       Appearance: Normal appearance.   Musculoskeletal:         General: No swelling, tenderness or signs of injury.   Skin:     General: Skin is warm and dry.      Comments: L heel laceration very clean without erythema, edges are smooth ~ 2 cm.  Non bleeding, no red streak from wound.  + MSP   Neurological:      Mental Status: She is alert and oriented to person, place, and time.         Procedures    Point of Care Test & Imaging Results from this visit  No results found for this visit on 02/22/25.   No results found.    Diagnostic study results (if any) were reviewed by Lennie Díaz PA-C.    Assessment/Plan "   Allergies, medications, history, and pertinent labs/EKGs/Imaging reviewed by Lennie Díaz PA-C.     Medical Decision Making  Pt wanted antibiotic, she is allergic to keflex, PCN class, Bactrim, cipro    Orders and Diagnoses  Diagnoses and all orders for this visit:  Foot laceration, left, initial encounter      Medical Admin Record      Patient disposition: Home    Electronically signed by Lennie Díaz PA-C  7:42 PM

## 2025-02-24 ENCOUNTER — OFFICE VISIT (OUTPATIENT)
Dept: PRIMARY CARE | Facility: CLINIC | Age: 60
End: 2025-02-24
Payer: COMMERCIAL

## 2025-02-24 VITALS
TEMPERATURE: 98.6 F | WEIGHT: 141 LBS | BODY MASS INDEX: 28.43 KG/M2 | OXYGEN SATURATION: 97 % | HEIGHT: 59 IN | SYSTOLIC BLOOD PRESSURE: 116 MMHG | DIASTOLIC BLOOD PRESSURE: 54 MMHG

## 2025-02-24 DIAGNOSIS — S91.312A LACERATION OF SKIN OF LEFT FOOT, INITIAL ENCOUNTER: Primary | ICD-10-CM

## 2025-02-24 PROCEDURE — 99212 OFFICE O/P EST SF 10 MIN: CPT | Performed by: PEDIATRICS

## 2025-02-24 PROCEDURE — 3008F BODY MASS INDEX DOCD: CPT | Performed by: PEDIATRICS

## 2025-02-24 PROCEDURE — 1036F TOBACCO NON-USER: CPT | Performed by: PEDIATRICS

## 2025-02-24 ASSESSMENT — ENCOUNTER SYMPTOMS
PALPITATIONS: 0
FEVER: 0
SORE THROAT: 0
DIZZINESS: 0
SINUS PRESSURE: 0
DIFFICULTY URINATING: 0
CONSTIPATION: 0
HEADACHES: 0
DYSURIA: 0
FREQUENCY: 0
NAUSEA: 0
APPETITE CHANGE: 0
ARTHRALGIAS: 1
DIARRHEA: 0
ACTIVITY CHANGE: 0
FLANK PAIN: 0
COUGH: 0
ABDOMINAL PAIN: 0
SINUS PAIN: 0
MYALGIAS: 0
CHILLS: 0
VOMITING: 0
SHORTNESS OF BREATH: 0
FATIGUE: 0
WOUND: 1
LIGHT-HEADEDNESS: 0
EYES NEGATIVE: 1
CONSTITUTIONAL NEGATIVE: 1
BACK PAIN: 0

## 2025-02-24 NOTE — PROGRESS NOTES
"Subjective   Patient ID: Mady Chapa is a 59 y.o. female who presents for Urgent Care follow-up.    HPI   Patient was recently see in urgent care for a laceration of her left ankle and leg pain. Patient doesn't remember cutting it, but noticed her foot was painful, no blood. Patient still has pain when she walks 6/10. Today she is also having groin pain on the left side, Worse with movement and bending. Patient has been taking tylenol which helps.     Review of Systems   Constitutional: Negative.  Negative for activity change, appetite change, chills, fatigue and fever.   HENT: Negative.  Negative for congestion, sinus pressure, sinus pain and sore throat.    Eyes: Negative.    Respiratory:  Negative for cough and shortness of breath.    Cardiovascular:  Negative for chest pain and palpitations.   Gastrointestinal:  Negative for abdominal pain, constipation, diarrhea, nausea and vomiting.   Genitourinary: Negative.  Negative for difficulty urinating, dysuria, flank pain and frequency.   Musculoskeletal:  Positive for arthralgias. Negative for back pain and myalgias.   Skin:  Positive for wound.   Neurological:  Negative for dizziness, light-headedness and headaches.       Objective   /54 (BP Location: Right arm, Patient Position: Sitting, BP Cuff Size: Adult)   Temp 37 °C (98.6 °F) (Skin)   Ht 1.499 m (4' 11\")   Wt 64 kg (141 lb)   SpO2 97%   BMI 28.48 kg/m²     Physical Exam  Constitutional:       General: She is not in acute distress.     Appearance: Normal appearance. She is normal weight.   HENT:      Head: Normocephalic and atraumatic.   Cardiovascular:      Rate and Rhythm: Normal rate and regular rhythm.      Pulses: Normal pulses.           Dorsalis pedis pulses are 2+ on the right side and 2+ on the left side.        Posterior tibial pulses are 2+ on the right side and 2+ on the left side.      Heart sounds: Normal heart sounds. No murmur heard.  Pulmonary:      Effort: Pulmonary effort is " normal.      Breath sounds: Normal breath sounds.   Abdominal:      General: Abdomen is flat. Bowel sounds are normal.      Palpations: Abdomen is soft. There is no mass.      Tenderness: There is no abdominal tenderness. There is no right CVA tenderness or left CVA tenderness.      Hernia: No hernia is present.   Musculoskeletal:         General: Tenderness present. No swelling. Normal range of motion.      Cervical back: Normal range of motion and neck supple. No tenderness.      Right hip: Tenderness present. No crepitus. Normal strength.      Left hip: Tenderness present. No crepitus. Normal strength.      Right lower leg: Normal. No swelling. No edema.      Left lower leg: Normal. No swelling. No edema.      Left foot: Normal range of motion. No deformity.      Comments: 2 + femoral pulses bilateral   Pain on abduction and adduction bilaterally.        Feet:      Right foot:      Skin integrity: Skin integrity normal.      Toenail Condition: Right toenails are normal.      Left foot:      Skin integrity: No skin breakdown, erythema or warmth.      Toenail Condition: Left toenails are normal.      Comments: 2 cm linear laceration on the lateral aspect of the left heel.  Non erythematous, tender to touch.    Neurological:      Mental Status: She is alert.         Assessment/Plan   Diagnoses and all orders for this visit:  Laceration of skin of left foot, initial encounter         Problem List Items Addressed This Visit       Laceration of skin of left foot - Primary

## 2025-02-25 ENCOUNTER — TELEPHONE (OUTPATIENT)
Dept: PRIMARY CARE | Facility: CLINIC | Age: 60
End: 2025-02-25
Payer: COMMERCIAL

## 2025-02-25 DIAGNOSIS — T14.8XXA INFECTED LACERATION: Primary | ICD-10-CM

## 2025-02-25 DIAGNOSIS — L08.9 INFECTED LACERATION: Primary | ICD-10-CM

## 2025-02-25 RX ORDER — ERYTHROMYCIN 333 MG/1
333 TABLET, DELAYED RELEASE ORAL 3 TIMES DAILY
Qty: 21 TABLET | Refills: 0 | Status: SHIPPED | OUTPATIENT
Start: 2025-02-25 | End: 2025-02-26 | Stop reason: SDUPTHER

## 2025-02-25 NOTE — TELEPHONE ENCOUNTER
Patient called advising she accidentally swallowed some of her brimonidine-timolol eye drops. She has no dizziness but stated she is experiencing a bad taste and burning feeling in her mouth. She requested a telephone call to advise what she should do for this issue.

## 2025-02-25 NOTE — TELEPHONE ENCOUNTER
Mady called back to office with update that she was cleaning her ankle(left) removed Band-Aid she noticed yellowish stuff wanted to know if it was the bacitracin from yesterday or if you think its infection.  Mady would like to speak with you.   rhinorrhea/CONGESTION/COUGH/PAIN

## 2025-02-26 DIAGNOSIS — L08.9 INFECTED LACERATION: ICD-10-CM

## 2025-02-26 DIAGNOSIS — T14.8XXA INFECTED LACERATION: ICD-10-CM

## 2025-02-26 RX ORDER — ERYTHROMYCIN 333 MG/1
333 TABLET, DELAYED RELEASE ORAL 3 TIMES DAILY
Qty: 21 TABLET | Refills: 0 | Status: SHIPPED | OUTPATIENT
Start: 2025-02-26 | End: 2025-03-05

## 2025-02-26 NOTE — TELEPHONE ENCOUNTER
Mady called office and advised that [facility] didn't have script Erythromycin 333 mg tab.  Mady wants medication to be called  over to [facility] as she states it cheaper for delivery and they have 400 mg tablets.  I called [facility] to confirm if medication was filled and pharmacy tech advised medication was ordered and will be in pharmacy tomorrow.    I asked if there was anything else the provider could prescribed all medications would have to be ordered,as they are not housed.  I called [facility] and spoke with pharmacy tech and was advised they only have 400 mg and not 333 mg tablet, as they would have to order what was prescribed and it will not arrive until Monday.    Please advise if you want to change script

## 2025-02-27 ENCOUNTER — APPOINTMENT (OUTPATIENT)
Dept: RADIOLOGY | Facility: CLINIC | Age: 60
End: 2025-02-27
Payer: COMMERCIAL

## 2025-02-27 ENCOUNTER — HOSPITAL ENCOUNTER (OUTPATIENT)
Dept: RADIOLOGY | Facility: CLINIC | Age: 60
Discharge: HOME | End: 2025-02-27
Payer: COMMERCIAL

## 2025-02-27 DIAGNOSIS — R29.2 ABNORMAL REFLEX: ICD-10-CM

## 2025-02-27 DIAGNOSIS — M54.2 CERVICALGIA: ICD-10-CM

## 2025-02-27 DIAGNOSIS — R20.2 PARESTHESIA OF SKIN: ICD-10-CM

## 2025-03-03 ENCOUNTER — TELEPHONE (OUTPATIENT)
Dept: HEMATOLOGY/ONCOLOGY | Facility: CLINIC | Age: 60
End: 2025-03-03

## 2025-03-03 ENCOUNTER — TELEMEDICINE (OUTPATIENT)
Dept: CARDIOLOGY | Facility: HOSPITAL | Age: 60
End: 2025-03-03
Payer: COMMERCIAL

## 2025-03-03 DIAGNOSIS — R07.9 CHEST PAIN, UNSPECIFIED TYPE: Primary | ICD-10-CM

## 2025-03-03 DIAGNOSIS — E78.00 HIGH BLOOD CHOLESTEROL: ICD-10-CM

## 2025-03-03 DIAGNOSIS — R73.03 PREDIABETES: ICD-10-CM

## 2025-03-03 DIAGNOSIS — D69.6 THROMBOCYTOPENIA, UNSPECIFIED (CMS-HCC): ICD-10-CM

## 2025-03-03 DIAGNOSIS — R07.89 NON-CARDIAC CHEST PAIN: ICD-10-CM

## 2025-03-03 PROCEDURE — 1036F TOBACCO NON-USER: CPT

## 2025-03-03 PROCEDURE — 99214 OFFICE O/P EST MOD 30 MIN: CPT

## 2025-03-03 PROCEDURE — G2211 COMPLEX E/M VISIT ADD ON: HCPCS

## 2025-03-03 NOTE — PROGRESS NOTES
Location of visit: [facility]   Type of Visit: Established - Last Seen: 01-22-25    Chief Complaint:  Patient was referred to Cardiology by Dr. Pike for chest pain    History Of Present Illness:    Mady Chapa is a 59 y.o. female, with history significant for elevated cholesterol, prediabetes, fibromyalgia, fatty liver, GERD and snoring who visits Cardiology today as a follow up visit  for chest.    Virtual or Telephone Consent  A telephone visit (audio only) between the patient (at the originating site) and the provider (at the distant site) was utilized to provide this telehealth service.  Verbal consent was requested and obtained from Mady Chapa on this date, 03/03/25 for a telehealth visit.     59-year-old female presenting for cardiology evaluation with a history of elevated cholesterol, prediabetes, and chest pain characterized as chest wall tenderness.  She also reports snoring with a pending sleep study and migraines that respond to eletriptan.   Last lipid panel showed > Chol 132 - LDL 67 - trig 87  Using atorvastatin 20mg and propranolol and gabapentin  Walks about 30 min a day  Tobacco -  [place] -  Family history of her sister had a [place] attack at 70s  She states that she has normal functional capacity, and she has been having intermittent episodes of chest pain, most of the time the pain can be elicited by pressing her chest.  She denies dyspnea on exertion, shortness of breath, orthopnea, PND, nocturia, edema, palpitations, dizziness, lightheadedness, syncope, claudication.    ============================   Stress test>  No clinical or electrocardiographic evidence for ischemia at submaximal workload.. Submaximal level of stress achieved.     She reports that her chest pain has improved. She feels less short of breath and is performing activities without any issues. She has not experienced presyncope or syncope and is taking her medications without any problems. Additionally, she is very  distressed about the health of Minesh, her partner, and is concerned about the use of anticoagulants. I answered all her questions.    Past Medical History:  She has a past medical history of Blindness of right eye, Body mass index (BMI) 24.0-24.9, adult (10/27/2021), CTS (carpal tunnel syndrome) (2022), Depression (1976?), Fibroid, Kidney stone (2018), Low platelet count (CMS-HCC), Lumbago with sciatica, right side (01/08/2019), Migraine (1990), Other chest pain (03/13/2020), Other specified personal risk factors, not elsewhere classified (10/04/2022), Pain in left foot (04/13/2021), Pain in throat (10/25/2018), Personal history of other diseases of the digestive system (06/20/2022), Personal history of other diseases of the musculoskeletal system and connective tissue (10/20/2021), Personal history of other diseases of the respiratory system (04/06/2020), Personal history of other diseases of the respiratory system (03/24/2020), Preglaucoma, unspecified, unspecified eye (07/31/2013), Rheumatoid arthritis (2019?), Superficial foreign body, left foot, sequela (01/17/2022), and Xerosis cutis (06/26/2020).    Past Surgical History:  She has a past surgical history that includes Adenoidectomy (07/31/2013); Eye surgery (07/31/2013); and Colonoscopy (07/31/2013).    Social History:  She reports that she has never smoked. She has never been exposed to tobacco smoke. She has never used smokeless tobacco. She reports that she does not drink alcohol and does not use drugs.    Family History:  Family History   Problem Relation Name Age of Onset    Dementia Mother Ruthie     Kidney failure Mother Ruthie     Hypertension Mother Ruthie     Diabetes Mother Ruthie     Hypertension Father Neptali     Hyperlipidemia Father Neptali     Other (Open heart surgery) Father Neptali     Breast cancer Father Neptali     COPD Father Neptali     Heart disease Father Neptali     Hypertension Sister Tamika     Hyperlipidemia Sister Tamika     Breast cancer  Sister Tamika     Breast cancer Father's Brother Jon     Heart disease Maternal Grandfather Don't Remember     Breast cancer Paternal Grandmother Don't  remember     Arthritis Sister Carlota     Mental illness Mother's Sister Jerri      Allergies:  Amitriptyline, Biotin, Clindamycin, Doxycycline, Latex, Metronidazole, Nortriptyline, Omeprazole, Sulfamethoxazole-trimethoprim, Cephalexin, Ciprofloxacin, Magnesium citrate, Nitrofurantoin monohyd/m-cryst, Penicillins, and Sulfa (sulfonamide antibiotics)    Outpatient Medications:  Current Outpatient Medications   Medication Instructions    acetaminophen (TYLENOL) 500 mg    ammonium lactate (Lac-Hydrin) 12 % lotion 1 Application, Daily    atorvastatin (LIPITOR) 20 mg, oral, Daily    brimonidine-timoloL (Combigan) 0.2-0.5 % ophthalmic solution     butalbital-acetaminophen-caff -40 mg tablet     cyanocobalamin (VITAMIN B-12) 1,000 mcg, oral, Daily    diclofenac sodium (VOLTAREN) 4 g, Topical, 4 times daily    diclofenac sodium 1 % kit Topical    dicyclomine (BENTYL) 20 mg, Every 6 hours PRN    eletriptan (Relpax) 40 mg tablet     erythromycin (DORA-TAB) 333 mg, oral, 3 times daily, Do not crush, chew, or split.    famotidine (Pepcid) 40 mg tablet Take one twice a day for a month and then take one tab once daily    gabapentin (NEURONTIN) 300 mg, 2 times daily    latanoprost (Xalatan) 0.005 % ophthalmic solution Administer into affected eye(s).    lidocaine (XYLOCAINE) 4 % gel gel Apply 4 times a day    loratadine (CLARITIN) 10 mg, Daily RT    Lumigan 0.01 % ophthalmic solution     meclizine (ANTIVERT) 25 mg, 2 times daily PRN    propranolol XL (INNOPRAN XL) 120 mg, Daily    psyllium (Metamucil, sugar,) powder Take by mouth.    Rocklatan 0.02-0.005 % drops     sodium chloride (Ayr) 0.65 % nasal drops 1-2 drops     Last Recorded Vitals:  There were no vitals filed for this visit.    Physical Exam:      2/24/2025    12:08 PM 2/22/2025     7:23 PM 2/20/2025    12:28 PM  "1/30/2025    12:13 PM 12/9/2024     1:45 PM 12/4/2024     8:14 PM   Vitals   Systolic 116 154 121 134 129    Diastolic 54 69 68 78 70    BP Location Right arm  Left arm  Left arm    Heart Rate  63 58 52 51    Temp 37 °C (98.6 °F) 36.8 °C (98.3 °F) 36.1 °C (97 °F)  36.4 °C (97.5 °F) 37 °C (98.6 °F)   Resp  18       Height 1.499 m (4' 11\") 1.499 m (4' 11\") 1.499 m (4' 11\")   1.499 m (4' 11\")   Weight (lb) 141 136 141  136.5    BMI 28.48 kg/m2 27.47 kg/m2 28.48 kg/m2  27.57 kg/m2 26.72 kg/m2   BSA (m2) 1.63 m2 1.6 m2 1.63 m2  1.61 m2 1.58 m2   Visit Report Report Report Report Report Report      Wt Readings from Last 5 Encounters:   02/24/25 64 kg (141 lb)   02/22/25 61.7 kg (136 lb)   02/20/25 64 kg (141 lb)   12/09/24 61.9 kg (136 lb 8 oz)   12/04/24 60 kg (132 lb 4.4 oz)       Physical Exam   Not performed because it was a phone visit      Last Labs Reviewed:  CBC -  Recent Labs     07/17/24  0848 06/21/23  0851 11/27/22  1320 09/15/22  1555 06/09/22  0918   WBC 5.8 6.5 7.2 7.5 5.1   HGB 12.5 12.6 13.2 13.8 12.9   HCT 36.0 36.4 38.3 39.6 39.1   PLT 84* 84* 86* 84* 73*   MCV 90 91 92.5 93.0 94.7     CMP -  Recent Labs     12/09/24  1556 11/27/22  1320 09/15/22  1816 09/15/22  1555 06/09/22  0918 04/01/22  1428    141  --  140 141 143   K 3.8 4.7 3.9 SAMPLE HEMOLYZED TO BE RECOLLECTED 4.4 4.1    105  --  102 103 108*   CO2 27 27  --  26 28 25   ANIONGAP 15 10  --  12 10 10   BUN 11 10  --  12 9 13   CREATININE 0.81 0.7  --  0.7 0.8 0.7   EGFR 84 101  --  101 86 102   CALCIUM 9.2 9.7  --  10.0 10.0 9.5     Recent Labs     11/27/22  1320 09/27/22  0902 09/15/22  1816 09/15/22  1555 07/21/22  1005 06/09/22  0918 01/21/22  1512 03/13/20  1058 02/10/20  1207   ALBUMIN 4.3  --   --  4.6 4.8 4.9 4.4   < > 4.3   ALKPHOS 61  --  54 SAMPLE HEMOLYZED TO BE RECOLLECTED 58 63 56   < > 43   ALT 45*  --  54* SAMPLE HEMOLYZED TO BE RECOLLECTED 44* 46* 45*   < > 48*   AST 24  --  32 SAMPLE HEMOLYZED TO BE RECOLLECTED 22 33 " "23   < > 20   BILITOT 0.5  --   --  0.8 0.7 0.5 0.8   < > 0.7   LIPASE 42 73*  --  95*  --  66*  --   --  72    < > = values in this interval not displayed.     LIPID PANEL -   Recent Labs     12/09/24  1556 09/27/22  0902 07/21/22  1005 01/21/22  1512 03/13/20  1058 02/04/19  1345 09/24/18  1139   CHOL 132 117* 119* 99* 108 121 99   LDLF  --   --   --   --  56 61 50   LDLCALC  --  57* 52* 40*  --   --   --    HDL 50.0 50* 56 47* 39.3* 42.5 35.3*   TRIG 87 49 53 61 65 87 71     COAGULATION PANEL -  Recent Labs     02/10/20  1207   INR 1.1     HEME/ENDO -  Recent Labs     07/17/24  0848 06/27/24  1202 09/27/22  0902 06/14/22  1015 01/21/22  1512 09/04/20  1155   FERRITIN 54  --   --   --   --   --    IRONSAT 33  --   --   --   --   --    TSH  --   --   --  2.02 0.85  --    HGBA1C  --  4.4 4.6  --  4.5 4.8   FREET4  --   --   --  0.99  --   --      CARDIOVASCULAR  No results for input(s): \"LDH\", \"CKMB\", \"TROPHS\", \"BNP\", \"CRP\" in the last 51465 hours.    No lab exists for component: \"CK\", \"CKMBP\", \"CRPUS\", \"USCRP\"  Last Cardiology/Imaging Tests Personally Reviewed (if images available) and Interpreted:  ECG:  Encounter Date: 11/19/24   ECG 12 lead (Clinic Performed)    Narrative    Normal sinus rhythm; normal EKG   ECG 12 lead (Clinic Performed) 11/20/2024    Echocardiogram:  No results found for this or any previous visit from the past 1825 days.  No results found for this or any previous visit from the past 1095 days.    Cath:  No results found for this or any previous visit from the past 1825 days.  No results found for this or any previous visit from the past 3650 days.  No results found for this or any previous visit from the past 1095 days.    Stress Test:  No results found for this or any previous visit from the past 1825 days.  No results found for this or any previous visit from the past 1095 days.    Cardiac CT/MRI:  No results found for this or any previous visit from the past 1825 days.  No results found " for this or any previous visit from the past 1095 days.    Other CT:      CV RISK FACTORS:   # Hypertension: Last BP:  .  # Hyperlipidemia: Last Tchol 132 / LDL No results found for requested labs within last 365 days. / HDL 50.0 / TRIG 87 (12/9/2024:  3:56 PM).  # Type II Diabetes Mellitus: Last A1c 4.4 (6/27/2024: 12:02 PM).  # Obesity: Last BMI:  .  # CKD: Last BUN/Cr (GFR): 11/0.81 (84), 12/9/2024:  3:56 PM.    ASCV RISK:  The 10-year ASCVD risk score (Roro BURNS, et al., 2019) is: 1.7%    Values used to calculate the score:      Age: 59 years      Sex: Female      Is Non- : No      Diabetic: No      Tobacco smoker: No      Systolic Blood Pressure: 116 mmHg      Is BP treated: No      HDL Cholesterol: 50 mg/dL      Total Cholesterol: 132 mg/dL    Assessment/Plan   Mady Chapa is a 59 y.o. female, presenting for cardiology evaluation. She has a history of elevated cholesterol, prediabetes, and chest pain previously described as chest wall tenderness. She reports occasional migraines responsive to eletriptan and is currently using atorvastatin 20 mg and propranolol. Her last lipid panel indicated total cholesterol of 132 mg/dL, LDL 67 mg/dL, and triglycerides 87 mg/dL. A sleep study is pending due to reported snoring.    At the follow up visit> She reports that her chest pain has improved. She feels less short of breath and is performing activities without any issues. She has not experienced presyncope or syncope and is taking her medications without any problems. Additionally, she is very distressed about the health of Minesh, her partner, and is concerned about the use of anticoagulants. I answered all her questions.    #Chest pain - better  Stress test> No clinical or electrocardiographic evidence for ischemia at submaximal workload. Submaximal level of stress achieved.  #Stable Dyslipidemia - Well-managed on current atorvastatin dose.  #Prediabetes - Requires continued monitoring and  lifestyle interventions.  #Possible Obstructive Sleep Apnea (ZAIN) - Snoring and pending sleep study suggest ZAIN evaluation is warranted.    Assessment and Plan:  -Stress test negative for ischemia. Increase physical activity  -Lipid Management: Continue atorvastatin 20 mg daily. Lipid levels are within goal range. Recommend repeating the lipid panel in 6-12 months.  -I emphasize lifestyle changes, including maintaining a heart-healthy diet and regular exercise.  -Prediabetes: Encourage weight control through dietary modifications (preferably with a dietitian consult) and regular physical activity.  Suggest annual HbA1c testing for monitoring glycemic status.  -Chest Pain History: I reassure the patient that the chest wall tenderness is likely musculoskeletal and non-cardiac. No further evaluation is necessary unless new symptoms arise.  -Possible Obstructive Sleep Apnea (ZIAN): Emphasize the importance of completing the pending sleep study to evaluate snoring and rule out ZAIN. If confirmed, discuss potential benefits of CPAP therapy for overall cardiovascular health.    Recommendations:  -I encourage adherence to medications and recommended lifestyle modifications.  -Follow up with cardiology as needed - keep following with your PCP.  - I spent 46 minutes assessing the case between pre-charting, phone patient interaction, and documentation    Cal Long MD

## 2025-03-03 NOTE — TELEPHONE ENCOUNTER
Tct Mady , she requested her labs be changed to [facility], labs changed as requested. She will get drawn prior to appt. Teach back done.

## 2025-03-03 NOTE — PATIENT INSTRUCTIONS
Today, we reviewed your health concerns and made a plan moving forward:    Heart Health & Exercise: Your stress test was normal, meaning no signs of heart-related blockages. I encourage you to increase your physical activity to maintain good heart health.    Cholesterol Management: Your cholesterol levels are within the goal range. Continue taking atorvastatin (20 mg daily), and we’ll check your lipid levels again in 6-12 months.    Prediabetes: To help prevent diabetes, focus on weight management through a healthy diet (consider working with a dietitian) and regular exercise. We’ll monitor your blood sugar yearly with an HbA1c test.    Chest Pain: The discomfort you’ve had seems to be from your muscles and not your heart. No further testing is needed unless new symptoms develop.    Possible Sleep Apnea: Completing your sleep study is important to check for sleep apnea. If you have it, using a CPAP machine could benefit your heart and overall health.    Next Steps:  Keep taking your medications as prescribed.  Stay active and maintain a heart-healthy diet.  Follow up with your primary doctor and cardiology as needed.    Let me know if you have any questions!

## 2025-03-05 ENCOUNTER — APPOINTMENT (OUTPATIENT)
Dept: CARDIOLOGY | Facility: CLINIC | Age: 60
End: 2025-03-05
Payer: COMMERCIAL

## 2025-03-05 ENCOUNTER — LAB (OUTPATIENT)
Dept: LAB | Facility: HOSPITAL | Age: 60
End: 2025-03-05
Payer: COMMERCIAL

## 2025-03-05 ENCOUNTER — HOSPITAL ENCOUNTER (EMERGENCY)
Facility: HOSPITAL | Age: 60
Discharge: HOME | End: 2025-03-05
Payer: COMMERCIAL

## 2025-03-05 ENCOUNTER — APPOINTMENT (OUTPATIENT)
Dept: PRIMARY CARE | Facility: CLINIC | Age: 60
End: 2025-03-05
Payer: COMMERCIAL

## 2025-03-05 VITALS
SYSTOLIC BLOOD PRESSURE: 162 MMHG | TEMPERATURE: 98.2 F | RESPIRATION RATE: 18 BRPM | OXYGEN SATURATION: 99 % | HEART RATE: 58 BPM | BODY MASS INDEX: 27.21 KG/M2 | HEIGHT: 59 IN | DIASTOLIC BLOOD PRESSURE: 90 MMHG | WEIGHT: 135 LBS

## 2025-03-05 DIAGNOSIS — S09.90XA CLOSED HEAD INJURY, INITIAL ENCOUNTER: Primary | ICD-10-CM

## 2025-03-05 DIAGNOSIS — D69.6 THROMBOCYTOPENIA, UNSPECIFIED (CMS-HCC): Primary | ICD-10-CM

## 2025-03-05 DIAGNOSIS — D69.6 THROMBOCYTOPENIA (CMS-HCC): ICD-10-CM

## 2025-03-05 LAB
BASOPHILS # BLD MANUAL: 0 X10*3/UL (ref 0–0.1)
BASOPHILS NFR BLD MANUAL: 0 %
EOSINOPHIL # BLD MANUAL: 0.21 X10*3/UL (ref 0–0.7)
EOSINOPHIL NFR BLD MANUAL: 3 %
ERYTHROCYTE [DISTWIDTH] IN BLOOD BY AUTOMATED COUNT: 12.1 % (ref 11.5–14.5)
HCT VFR BLD AUTO: 35.9 % (ref 36–46)
HGB BLD-MCNC: 12.4 G/DL (ref 12–16)
IMM GRANULOCYTES # BLD AUTO: 0.02 X10*3/UL (ref 0–0.7)
IMM GRANULOCYTES NFR BLD AUTO: 0.3 % (ref 0–0.9)
LYMPHOCYTES # BLD MANUAL: 2.35 X10*3/UL (ref 1.2–4.8)
LYMPHOCYTES NFR BLD MANUAL: 34 %
MCH RBC QN AUTO: 31.7 PG (ref 26–34)
MCHC RBC AUTO-ENTMCNC: 34.5 G/DL (ref 32–36)
MCV RBC AUTO: 92 FL (ref 80–100)
MONOCYTES # BLD MANUAL: 0.35 X10*3/UL (ref 0.1–1)
MONOCYTES NFR BLD MANUAL: 5 %
NEUTROPHILS # BLD MANUAL: 4 X10*3/UL (ref 1.2–7.7)
NEUTS BAND # BLD MANUAL: 0.07 X10*3/UL (ref 0–0.7)
NEUTS BAND NFR BLD MANUAL: 1 %
NEUTS SEG # BLD MANUAL: 3.93 X10*3/UL (ref 1.2–7)
NEUTS SEG NFR BLD MANUAL: 57 %
NRBC BLD-RTO: 0 /100 WBCS (ref 0–0)
PLATELET # BLD AUTO: 80 X10*3/UL (ref 150–450)
RBC # BLD AUTO: 3.91 X10*6/UL (ref 4–5.2)
RBC MORPH BLD: NORMAL
TOTAL CELLS COUNTED BLD: 100
VIT B12 SERPL-MCNC: 279 PG/ML (ref 211–911)
WBC # BLD AUTO: 6.9 X10*3/UL (ref 4.4–11.3)

## 2025-03-05 PROCEDURE — 36415 COLL VENOUS BLD VENIPUNCTURE: CPT

## 2025-03-05 PROCEDURE — 85027 COMPLETE CBC AUTOMATED: CPT

## 2025-03-05 PROCEDURE — 99283 EMERGENCY DEPT VISIT LOW MDM: CPT

## 2025-03-05 PROCEDURE — 2500000001 HC RX 250 WO HCPCS SELF ADMINISTERED DRUGS (ALT 637 FOR MEDICARE OP): Performed by: PHYSICIAN ASSISTANT

## 2025-03-05 PROCEDURE — 82607 VITAMIN B-12: CPT

## 2025-03-05 PROCEDURE — 85007 BL SMEAR W/DIFF WBC COUNT: CPT

## 2025-03-05 PROCEDURE — 99282 EMERGENCY DEPT VISIT SF MDM: CPT

## 2025-03-05 RX ORDER — ACETAMINOPHEN 325 MG/1
650 TABLET ORAL ONCE
Status: COMPLETED | OUTPATIENT
Start: 2025-03-05 | End: 2025-03-05

## 2025-03-05 RX ADMIN — ACETAMINOPHEN 650 MG: 325 TABLET ORAL at 08:29

## 2025-03-05 ASSESSMENT — PAIN - FUNCTIONAL ASSESSMENT: PAIN_FUNCTIONAL_ASSESSMENT: 0-10

## 2025-03-05 ASSESSMENT — COLUMBIA-SUICIDE SEVERITY RATING SCALE - C-SSRS
6. HAVE YOU EVER DONE ANYTHING, STARTED TO DO ANYTHING, OR PREPARED TO DO ANYTHING TO END YOUR LIFE?: NO
1. IN THE PAST MONTH, HAVE YOU WISHED YOU WERE DEAD OR WISHED YOU COULD GO TO SLEEP AND NOT WAKE UP?: NO
2. HAVE YOU ACTUALLY HAD ANY THOUGHTS OF KILLING YOURSELF?: NO

## 2025-03-05 ASSESSMENT — PAIN SCALES - GENERAL
PAINLEVEL_OUTOF10: 4
PAINLEVEL_OUTOF10: 0 - NO PAIN

## 2025-03-05 NOTE — ED PROVIDER NOTES
HPI   Chief Complaint   Patient presents with    Head Injury     Pt states that she was washing dishes and a plastic mug fell and hit her in the jaw. She states that the pain has subsided       HPI  This is a 59-year-old female presenting to the emergency room for evaluation of a plastic mug hitting the left side of her jaw this morning about an hour prior to arrival.  Patient stated she was doing dishes and there was a plastic mat that was sitting on top of her fridge.  She states the mug fell off the fridge and hit directly on the side of her left jar region.  She states initially she had pain in her left jaw however the pain is subsided.  She denies any head injury or neck injury.  No loss of consciousness.  She does not take any blood thinners.  Denies any difficulty speaking or swallowing.  She has no difficulties opening or closing her mouth.  No headaches, vomiting, nausea, visual disturbances.    Please see HPI for pertinent positive and negative ROS.       Patient History   Past Medical History:   Diagnosis Date    Blindness of right eye     Body mass index (BMI) 24.0-24.9, adult 10/27/2021    Body mass index (BMI) of 24.0 to 24.9 in adult    CTS (carpal tunnel syndrome) 2022    Depression 1976?    Fibroid     Kidney stone 2018    Low platelet count (CMS-HCC)     Lumbago with sciatica, right side 01/08/2019    Acute right-sided low back pain with right-sided sciatica    Migraine 1990    Other chest pain 03/13/2020    Chest wall pain    Other specified personal risk factors, not elsewhere classified 10/04/2022    At risk for allergic reaction to medication    Pain in left foot 04/13/2021    Left foot pain    Pain in throat 10/25/2018    Throat pain    Personal history of other diseases of the digestive system 06/20/2022    History of constipation    Personal history of other diseases of the musculoskeletal system and connective tissue 10/20/2021    History of neck pain    Personal history of other diseases  of the respiratory system 04/06/2020    History of pharyngitis    Personal history of other diseases of the respiratory system 03/24/2020    History of sore throat    Preglaucoma, unspecified, 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    Superficial foreign body, left foot, sequela 01/17/2022    Foreign body in left foot, sequela    Xerosis cutis 06/26/2020    Dry skin dermatitis     Past Surgical History:   Procedure Laterality Date    ADENOIDECTOMY  07/31/2013    Adenoidectomy    COLONOSCOPY  07/31/2013    Complete Colonoscopy    EYE SURGERY  07/31/2013    Eye Surgery     Family History   Problem Relation Name Age of Onset    Dementia Mother Ruthie     Kidney failure Mother Ruthie     Hypertension Mother Ruthie     Diabetes Mother Ruthie     Hypertension Father Neptali     Hyperlipidemia Father Neptali     Other (Open heart surgery) Father Neptali     Breast cancer Father Neptali     COPD Father Neptali     Heart disease Father Neptali     Hypertension Sister Tamika     Hyperlipidemia Sister Tamika     Breast cancer Sister Tamika     Breast cancer Father's Brother Jon     Heart disease Maternal Grandfather Don't Remember     Breast cancer Paternal Grandmother Don't  remember     Arthritis Sister Carlota     Mental illness Mother's Sister Jerri      Social History     Tobacco Use    Smoking status: Never     Passive exposure: Never    Smokeless tobacco: Never   Vaping Use    Vaping status: Never Used   Substance Use Topics    Alcohol use: Never    Drug use: Never       Physical Exam   ED Triage Vitals [03/05/25 0753]   Temperature Heart Rate Respirations BP   36.8 °C (98.2 °F) 58 18 162/90      Pulse Ox Temp Source Heart Rate Source Patient Position   99 % Temporal Monitor Sitting      BP Location FiO2 (%)     Left arm --       Physical Exam  GENERAL APPEARANCE: This patient is in no acute respiratory distress. Awake and alert, talking appropriately. Answering questions appropriately. No evidence of  pressured speech  VITAL SIGNS: As per the nurses' triage record.  HEENT: Normocephalic, atraumatic.  Orbital bones grossly intact without ecchymosis or tenderness to palpation bilaterally. No nasal gross abnormalities. No septal hematoma.  No midface instability.  No facial abrasions.  Patient has no bony deformities with palpation of the region.  No point tenderness palpation over the left jaw region.  No overlying skin redness, no or ecchymosis.  No trismus is present.  Full range of motion of TMJ without any difficulties.  NECK:  full gross ROM during exam, no bony step-offs appreciated over cervical midline, no pain with palpation.  MUSCULOSKELETAL:  Full gross active range of motion. Ambulating on own with no acute difficulties  NEUROLOGICAL: Awake, alert and oriented x 3.  Cranial nerves II through XII grossly intact bilaterally.  IMMUNOLOGICAL: No palpable lymphadenopathy or lymphatic streaking noted on visible skin.  DERM: No petechiae, rashes, or ecchymoses on visible skin  PSYCH: mood and affect appear normal.      ED Course & MDM   Diagnoses as of 03/05/25 0838   Closed head injury, initial encounter                 No data recorded     [place] Coma Scale Score: 15 (03/05/25 0751 : Peggy Coyle RN)                           Medical Decision Making  Parts of this chart have been completed using voice recognition software. Please excuse any errors of transcription.  My thought process and reason for plan has been formulated from the time that I saw the patient until the time of disposition and is not specific to one specific moment during their visit and furthermore my MDM encompasses this entire chart and not only this text box.      HPI: Detailed above.    Exam: A medically appropriate exam performed, outlined above, given the known history and presentation.    History obtained from: Patient    Medications given during visit:  Medications - No data to display     Diagnostic/tests  Labs Reviewed - No  data to display   No orders to display        Considerations/further MDM:    I have very low suspicion for any type of bony deformity or injury based off of mechanism of injury and physical exam findings.  I do not feel imaging is clinically indicated.  Patient was given oral Tylenol.  Pain resolved in the emergency department.  She was discharged with instructions to follow-up with her primary care doctor and use over-the-counter Tylenol as needed.  Discharged in stable condition in the company of her .      Procedure  Procedures     Sil Barragan PA-C  03/05/25 0841

## 2025-03-06 ENCOUNTER — TELEPHONE (OUTPATIENT)
Dept: PRIMARY CARE | Facility: CLINIC | Age: 60
End: 2025-03-06
Payer: COMMERCIAL

## 2025-03-06 NOTE — TELEPHONE ENCOUNTER
Patient is calling requesting a callback to the number [phone]. She stated she is concerned about the possibility of Minesh eating cardboard that was on a piece of cheesecake.

## 2025-03-10 ENCOUNTER — APPOINTMENT (OUTPATIENT)
Dept: HEMATOLOGY/ONCOLOGY | Facility: CLINIC | Age: 60
End: 2025-03-10
Payer: COMMERCIAL

## 2025-03-10 DIAGNOSIS — D69.6 THROMBOCYTOPENIA (CMS-HCC): Primary | ICD-10-CM

## 2025-03-11 ENCOUNTER — APPOINTMENT (OUTPATIENT)
Dept: RADIOLOGY | Facility: HOSPITAL | Age: 60
End: 2025-03-11
Payer: COMMERCIAL

## 2025-03-12 ENCOUNTER — APPOINTMENT (OUTPATIENT)
Dept: CARDIOLOGY | Facility: CLINIC | Age: 60
End: 2025-03-12
Payer: COMMERCIAL

## 2025-03-17 ENCOUNTER — APPOINTMENT (OUTPATIENT)
Dept: RADIOLOGY | Facility: CLINIC | Age: 60
End: 2025-03-17
Payer: COMMERCIAL

## 2025-03-21 ENCOUNTER — OFFICE VISIT (OUTPATIENT)
Dept: PRIMARY CARE | Facility: CLINIC | Age: 60
End: 2025-03-21
Payer: COMMERCIAL

## 2025-03-21 VITALS
HEIGHT: 59 IN | OXYGEN SATURATION: 96 % | WEIGHT: 140 LBS | BODY MASS INDEX: 28.22 KG/M2 | SYSTOLIC BLOOD PRESSURE: 139 MMHG | DIASTOLIC BLOOD PRESSURE: 60 MMHG | HEART RATE: 59 BPM

## 2025-03-21 DIAGNOSIS — R10.9 FLANK PAIN: Primary | ICD-10-CM

## 2025-03-21 DIAGNOSIS — M79.7 FIBROMYALGIA: ICD-10-CM

## 2025-03-21 LAB
APPEARANCE UR: CLEAR
BILIRUB UR QL STRIP: NEGATIVE
COLOR UR: YELLOW
GLUCOSE UR STRIP-MCNC: NEGATIVE MG/DL
HGB UR QL STRIP: NEGATIVE
KETONES UR STRIP-MCNC: NEGATIVE MG/DL
LEUKOCYTE ESTERASE UR QL STRIP: NEGATIVE
NITRITE UR QL STRIP: NEGATIVE
PH UR STRIP: 5.5 [PH]
PROT UR STRIP-MCNC: NORMAL MG/DL
SP GR UR STRIP.AUTO: >=1.03
UROBILINOGEN UR STRIP-ACNC: 0.2 E.U./DL

## 2025-03-21 PROCEDURE — 99212 OFFICE O/P EST SF 10 MIN: CPT | Performed by: PEDIATRICS

## 2025-03-21 PROCEDURE — 81003 URINALYSIS AUTO W/O SCOPE: CPT | Performed by: PEDIATRICS

## 2025-03-21 PROCEDURE — 3008F BODY MASS INDEX DOCD: CPT | Performed by: PEDIATRICS

## 2025-03-21 RX ORDER — MENTHOL 40 MG/ML
GEL TOPICAL
Qty: 118 ML | Refills: 3 | Status: SHIPPED | OUTPATIENT
Start: 2025-03-21

## 2025-03-21 NOTE — PROGRESS NOTES
"Subjective   Patient ID: Mady Chapa is a 59 y.o. female who presents for Flank Pain.    HPI   Patient has pain for 2 days in right iliac region  Review of Systems    Objective   /60   Pulse 59   Ht 1.499 m (4' 11\")   Wt 63.5 kg (140 lb)   SpO2 96%   BMI 28.28 kg/m²     Physical Exam  Right iliac  posteriorly  Flank itself nontender currently  Right hip xray normal last year  Assessment/Plan   Problem List Items Addressed This Visit    None  Visit Diagnoses         Codes    Flank pain    -  Primary R10.9    Relevant Orders    POCT UA (Automated) docked device    Urine Culture               "

## 2025-03-23 LAB — BACTERIA UR CULT: NORMAL

## 2025-03-24 ENCOUNTER — TELEPHONE (OUTPATIENT)
Dept: PRIMARY CARE | Facility: CLINIC | Age: 60
End: 2025-03-24
Payer: COMMERCIAL

## 2025-03-24 NOTE — TELEPHONE ENCOUNTER
Patient is requesting a herb stating she has been experiencing pain in her right leg near her knee. She stated there is not any swelling or redness and it only hurts when walking and laying down. She would like a call to the number [phone].

## 2025-03-26 ENCOUNTER — APPOINTMENT (OUTPATIENT)
Dept: PRIMARY CARE | Facility: CLINIC | Age: 60
End: 2025-03-26
Payer: COMMERCIAL

## 2025-03-26 ENCOUNTER — APPOINTMENT (OUTPATIENT)
Dept: CARDIOLOGY | Facility: CLINIC | Age: 60
End: 2025-03-26
Payer: COMMERCIAL

## 2025-03-28 ENCOUNTER — TELEPHONE (OUTPATIENT)
Dept: PRIMARY CARE | Facility: CLINIC | Age: 60
End: 2025-03-28
Payer: COMMERCIAL

## 2025-03-28 DIAGNOSIS — E78.00 HIGH BLOOD CHOLESTEROL: ICD-10-CM

## 2025-03-31 ENCOUNTER — OFFICE VISIT (OUTPATIENT)
Dept: PRIMARY CARE | Facility: CLINIC | Age: 60
End: 2025-03-31
Payer: COMMERCIAL

## 2025-03-31 VITALS
DIASTOLIC BLOOD PRESSURE: 74 MMHG | WEIGHT: 142 LBS | SYSTOLIC BLOOD PRESSURE: 130 MMHG | TEMPERATURE: 97.3 F | HEIGHT: 59 IN | HEART RATE: 52 BPM | OXYGEN SATURATION: 98 % | BODY MASS INDEX: 28.63 KG/M2

## 2025-03-31 DIAGNOSIS — Z00.00 HEALTHCARE MAINTENANCE: Primary | ICD-10-CM

## 2025-03-31 DIAGNOSIS — M79.7 FIBROMYALGIA: ICD-10-CM

## 2025-03-31 DIAGNOSIS — I83.93 VARICOSE VEINS OF BOTH LOWER EXTREMITIES, UNSPECIFIED WHETHER COMPLICATED: ICD-10-CM

## 2025-03-31 DIAGNOSIS — E53.8 VITAMIN B12 DEFICIENCY: ICD-10-CM

## 2025-03-31 PROBLEM — I83.813 VARICOSE VEINS OF BOTH LOWER EXTREMITIES WITH PAIN: Status: ACTIVE | Noted: 2023-08-25

## 2025-03-31 PROCEDURE — 3008F BODY MASS INDEX DOCD: CPT | Performed by: PEDIATRICS

## 2025-03-31 PROCEDURE — 99213 OFFICE O/P EST LOW 20 MIN: CPT | Performed by: PEDIATRICS

## 2025-03-31 RX ORDER — POLYETHYLENE GLYCOL 3350 17 G/17G
POWDER, FOR SOLUTION ORAL DAILY
COMMUNITY

## 2025-03-31 RX ORDER — LANOLIN ALCOHOL/MO/W.PET/CERES
1000 CREAM (GRAM) TOPICAL DAILY
Qty: 90 TABLET | Refills: 3 | Status: SHIPPED | OUTPATIENT
Start: 2025-03-31 | End: 2026-03-31

## 2025-03-31 RX ORDER — VIT C/E/ZN/COPPR/LUTEIN/ZEAXAN 250MG-90MG
25 CAPSULE ORAL DAILY
Qty: 90 CAPSULE | Refills: 1 | Status: SHIPPED | OUTPATIENT
Start: 2025-03-31 | End: 2026-03-31

## 2025-03-31 RX ORDER — GABAPENTIN 100 MG/1
CAPSULE ORAL
Qty: 90 CAPSULE | Refills: 0 | Status: SHIPPED | OUTPATIENT
Start: 2025-03-31

## 2025-03-31 RX ORDER — GABAPENTIN 300 MG/1
300 CAPSULE ORAL 3 TIMES DAILY
Qty: 90 CAPSULE | Refills: 0 | OUTPATIENT
Start: 2025-03-31

## 2025-03-31 ASSESSMENT — ENCOUNTER SYMPTOMS: LEG PAIN: 1

## 2025-03-31 NOTE — PROGRESS NOTES
"Subjective   Patient ID: Mady Chapa is a 59 y.o. female who presents for Leg Pain.    Leg Pain       Patient is 59 year old female who presents for anterior thigh pain. States this has been going on for a couple of weeks and is intermittent. Denies any leg injuries.  improvement with Tylenol and lidocaine gel. Has an appointment with ortho on April 24th. Denies redness or swelling.   Review of Systems    Objective   /74 (BP Location: Right arm)   Pulse 52   Temp 36.3 °C (97.3 °F) (Temporal)   Ht 1.499 m (4' 11\")   Wt 64.4 kg (142 lb)   SpO2 98%   BMI 28.68 kg/m²     Physical Exam  Patient has tenderness over right thigh at the site of some spider veins and varicose veins.  She also has some generalized tenderness over fibromyalgia trigger spots.  Assessment/Plan   Problem List Items Addressed This Visit             ICD-10-CM    Fibromyalgia M79.7    Relevant Medications    gabapentin (Neurontin) 100 mg capsule    gabapentin (Neurontin) 300 mg capsule    Vitamin B12 deficiency E53.8    Relevant Medications    cyanocobalamin (Vitamin B-12) 1,000 mcg tablet    Varicose veins of both lower extremities I83.93    Relevant Orders    Compression Stockings 15-20 mmHg     Other Visit Diagnoses         Codes    Healthcare maintenance    -  Primary Z00.00    Relevant Medications    cholecalciferol (Vitamin D3) 25 MCG (1000 UT) capsule               "

## 2025-04-01 ENCOUNTER — APPOINTMENT (OUTPATIENT)
Dept: PRIMARY CARE | Facility: CLINIC | Age: 60
End: 2025-04-01
Payer: COMMERCIAL

## 2025-04-01 RX ORDER — ATORVASTATIN CALCIUM 20 MG/1
20 TABLET, FILM COATED ORAL DAILY
Qty: 100 TABLET | Refills: 2 | Status: SHIPPED | OUTPATIENT
Start: 2025-04-01

## 2025-04-08 ENCOUNTER — APPOINTMENT (OUTPATIENT)
Dept: RADIOLOGY | Facility: HOSPITAL | Age: 60
End: 2025-04-08
Payer: COMMERCIAL

## 2025-04-08 ENCOUNTER — APPOINTMENT (OUTPATIENT)
Dept: RADIOLOGY | Facility: CLINIC | Age: 60
End: 2025-04-08
Payer: COMMERCIAL

## 2025-04-08 ENCOUNTER — APPOINTMENT (OUTPATIENT)
Dept: PRIMARY CARE | Facility: CLINIC | Age: 60
End: 2025-04-08
Payer: COMMERCIAL

## 2025-04-09 ENCOUNTER — APPOINTMENT (OUTPATIENT)
Dept: HEMATOLOGY/ONCOLOGY | Facility: CLINIC | Age: 60
End: 2025-04-09
Payer: COMMERCIAL

## 2025-04-13 DIAGNOSIS — M79.7 FIBROMYALGIA: ICD-10-CM

## 2025-04-15 RX ORDER — GABAPENTIN 100 MG/1
CAPSULE ORAL
Qty: 90 CAPSULE | Refills: 11 | Status: SHIPPED | OUTPATIENT
Start: 2025-04-15

## 2025-04-19 ENCOUNTER — HOSPITAL ENCOUNTER (EMERGENCY)
Facility: HOSPITAL | Age: 60
Discharge: HOME | End: 2025-04-19
Payer: COMMERCIAL

## 2025-04-19 ENCOUNTER — HOSPITAL ENCOUNTER (EMERGENCY)
Facility: HOSPITAL | Age: 60
Discharge: HOME | End: 2025-04-19
Attending: EMERGENCY MEDICINE
Payer: COMMERCIAL

## 2025-04-19 VITALS
HEART RATE: 82 BPM | WEIGHT: 139 LBS | RESPIRATION RATE: 16 BRPM | TEMPERATURE: 97.9 F | DIASTOLIC BLOOD PRESSURE: 84 MMHG | BODY MASS INDEX: 28.02 KG/M2 | HEIGHT: 59 IN | SYSTOLIC BLOOD PRESSURE: 132 MMHG | OXYGEN SATURATION: 100 %

## 2025-04-19 VITALS
RESPIRATION RATE: 16 BRPM | HEART RATE: 64 BPM | DIASTOLIC BLOOD PRESSURE: 82 MMHG | SYSTOLIC BLOOD PRESSURE: 160 MMHG | OXYGEN SATURATION: 95 %

## 2025-04-19 DIAGNOSIS — I10 HYPERTENSION, UNSPECIFIED TYPE: ICD-10-CM

## 2025-04-19 DIAGNOSIS — H53.9 VISUAL DISTURBANCE: Primary | ICD-10-CM

## 2025-04-19 DIAGNOSIS — H43.813 POSTERIOR VITREOUS DETACHMENT OF BOTH EYES: ICD-10-CM

## 2025-04-19 DIAGNOSIS — H40.051 RAISED INTRAOCULAR PRESSURE OF RIGHT EYE: Primary | ICD-10-CM

## 2025-04-19 PROCEDURE — 99284 EMERGENCY DEPT VISIT MOD MDM: CPT | Performed by: PHYSICIAN ASSISTANT

## 2025-04-19 PROCEDURE — 99283 EMERGENCY DEPT VISIT LOW MDM: CPT

## 2025-04-19 PROCEDURE — 99284 EMERGENCY DEPT VISIT MOD MDM: CPT

## 2025-04-19 PROCEDURE — 99285 EMERGENCY DEPT VISIT HI MDM: CPT | Performed by: EMERGENCY MEDICINE

## 2025-04-19 RX ORDER — TETRACAINE HYDROCHLORIDE 5 MG/ML
1 SOLUTION OPHTHALMIC ONCE
Status: DISCONTINUED | OUTPATIENT
Start: 2025-04-19 | End: 2025-04-19 | Stop reason: HOSPADM

## 2025-04-19 ASSESSMENT — COLUMBIA-SUICIDE SEVERITY RATING SCALE - C-SSRS
2. HAVE YOU ACTUALLY HAD ANY THOUGHTS OF KILLING YOURSELF?: NO
6. HAVE YOU EVER DONE ANYTHING, STARTED TO DO ANYTHING, OR PREPARED TO DO ANYTHING TO END YOUR LIFE?: NO
6. HAVE YOU EVER DONE ANYTHING, STARTED TO DO ANYTHING, OR PREPARED TO DO ANYTHING TO END YOUR LIFE?: NO
1. IN THE PAST MONTH, HAVE YOU WISHED YOU WERE DEAD OR WISHED YOU COULD GO TO SLEEP AND NOT WAKE UP?: NO
1. IN THE PAST MONTH, HAVE YOU WISHED YOU WERE DEAD OR WISHED YOU COULD GO TO SLEEP AND NOT WAKE UP?: NO
2. HAVE YOU ACTUALLY HAD ANY THOUGHTS OF KILLING YOURSELF?: NO

## 2025-04-19 ASSESSMENT — VISUAL ACUITY
OD: 20/30
OS: 20/40
OD: 20/200
OS: 20/30

## 2025-04-19 ASSESSMENT — PAIN - FUNCTIONAL ASSESSMENT
PAIN_FUNCTIONAL_ASSESSMENT: 0-10
PAIN_FUNCTIONAL_ASSESSMENT: 0-10

## 2025-04-19 ASSESSMENT — PAIN SCALES - GENERAL
PAINLEVEL_OUTOF10: 0 - NO PAIN
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
## 2025-04-19 NOTE — ED TRIAGE NOTES
Pt is here for right eye floaters since this morning and was sent by [facility] for opho consult. Floaters are better then earlier and denies vision changes just minor floaters .
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
## 2025-04-19 NOTE — DISCHARGE INSTRUCTIONS
Recommend follow-up with  [facility] within 1 week. Please call [phone] ([facility]) to make appointment.
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
## 2025-04-19 NOTE — PROGRESS NOTES
7:00 PM I received this patient signout from previous [facility].  Please see their note for full H&P.    In brief this is 59-year-old female PMH glaucoma presented to ED for chief complaint of ophthalmology consult.  Patient was signed out to me pending evaluation by ophthalmology and recommendations.    7:21 PM Received communication from ophthalmology that this patient currently is experiencing a posterior vitreous detachment.  No intervention indicated at this time.  Ophthalmology did recommend follow-up in 1 week with  retina team.  With this in mind did advise patient of these findings today and he showed understanding of this.  Patient was provided with contact information for follow-up.  Spoke with this patient regarding symptomatic management moving forward as well as return precautions.  Patient did have opportunity to ask questions and have them answered and no further complaints at this time and was amenable to plan move forward for discharge.    Lukasz Norton PA-C  4/19/2025

## 2025-04-22 ENCOUNTER — HOSPITAL ENCOUNTER (OUTPATIENT)
Dept: RADIOLOGY | Facility: CLINIC | Age: 60
End: 2025-04-22
Payer: COMMERCIAL

## 2025-04-24 ENCOUNTER — APPOINTMENT (OUTPATIENT)
Dept: ORTHOPEDIC SURGERY | Facility: CLINIC | Age: 60
End: 2025-04-24
Payer: COMMERCIAL

## 2025-04-25 DIAGNOSIS — K21.9 GASTROESOPHAGEAL REFLUX DISEASE, UNSPECIFIED WHETHER ESOPHAGITIS PRESENT: ICD-10-CM

## 2025-04-27 RX ORDER — FAMOTIDINE 40 MG/1
TABLET, FILM COATED ORAL
Qty: 90 TABLET | Refills: 3 | Status: SHIPPED | OUTPATIENT
Start: 2025-04-27

## 2025-04-28 ENCOUNTER — TELEPHONE (OUTPATIENT)
Dept: PRIMARY CARE | Facility: CLINIC | Age: 60
End: 2025-04-28
Payer: COMMERCIAL

## 2025-04-28 NOTE — TELEPHONE ENCOUNTER
Patient is experiencing a burn in her throat from coffee 1 week ago and is still experiencing pain. She can be reached at [phone].

## 2025-05-01 ENCOUNTER — APPOINTMENT (OUTPATIENT)
Dept: PRIMARY CARE | Facility: CLINIC | Age: 60
End: 2025-05-01
Payer: COMMERCIAL

## 2025-05-05 ENCOUNTER — OFFICE VISIT (OUTPATIENT)
Dept: PRIMARY CARE | Facility: CLINIC | Age: 60
End: 2025-05-05
Payer: COMMERCIAL

## 2025-05-05 ENCOUNTER — TELEPHONE (OUTPATIENT)
Dept: PRIMARY CARE | Facility: CLINIC | Age: 60
End: 2025-05-05

## 2025-05-05 ENCOUNTER — HOSPITAL ENCOUNTER (OUTPATIENT)
Dept: RADIOLOGY | Facility: CLINIC | Age: 60
Discharge: HOME | End: 2025-05-05
Payer: COMMERCIAL

## 2025-05-05 VITALS
RESPIRATION RATE: 16 BRPM | OXYGEN SATURATION: 97 % | BODY MASS INDEX: 28.63 KG/M2 | SYSTOLIC BLOOD PRESSURE: 137 MMHG | WEIGHT: 142 LBS | HEART RATE: 53 BPM | DIASTOLIC BLOOD PRESSURE: 75 MMHG | HEIGHT: 59 IN | TEMPERATURE: 97.5 F

## 2025-05-05 DIAGNOSIS — R10.9 ABDOMINAL PAIN, UNSPECIFIED ABDOMINAL LOCATION: Primary | ICD-10-CM

## 2025-05-05 DIAGNOSIS — E53.8 VITAMIN B12 DEFICIENCY: ICD-10-CM

## 2025-05-05 DIAGNOSIS — R10.9 ABDOMINAL PAIN, UNSPECIFIED ABDOMINAL LOCATION: ICD-10-CM

## 2025-05-05 PROCEDURE — 74018 RADEX ABDOMEN 1 VIEW: CPT

## 2025-05-05 PROCEDURE — 99213 OFFICE O/P EST LOW 20 MIN: CPT | Performed by: PEDIATRICS

## 2025-05-05 PROCEDURE — 3008F BODY MASS INDEX DOCD: CPT | Performed by: PEDIATRICS

## 2025-05-05 PROCEDURE — 1036F TOBACCO NON-USER: CPT | Performed by: PEDIATRICS

## 2025-05-05 ASSESSMENT — PATIENT HEALTH QUESTIONNAIRE - PHQ9
1. LITTLE INTEREST OR PLEASURE IN DOING THINGS: NOT AT ALL
2. FEELING DOWN, DEPRESSED OR HOPELESS: NOT AT ALL
SUM OF ALL RESPONSES TO PHQ9 QUESTIONS 1 AND 2: 0

## 2025-05-05 ASSESSMENT — PAIN SCALES - GENERAL: PAINLEVEL_OUTOF10: 3

## 2025-05-05 NOTE — PROGRESS NOTES
"Subjective   Patient ID: Mady Chapa is a 59 y.o. female who presents for right lower quadrant pain.    HPI   Patient has had right lower quadrant pain that is 5/10; Had a small bowel movements today and yesterday that was a little hard but mostly soft; Pain was better after BM; Pain comes and goes; May occur spontaneously or sometimes feels it when it walks; Tylenol helped a little but bentyl did as well.  Review of Systems  Throat hurts a little bit after drinking hot coffee  Objective   /75 (BP Location: Right arm, Patient Position: Sitting, BP Cuff Size: Adult)   Pulse 53   Temp 36.4 °C (97.5 °F) (Temporal)   Resp 16   Ht 1.499 m (4' 11\")   Wt 64.4 kg (142 lb)   SpO2 97%   BMI 28.68 kg/m²     Physical Exam  Abd soft; states it hurts a little on palpation  Good ROM of right hip  Assessment/Plan   Problem List Items Addressed This Visit    None  Visit Diagnoses         Codes      Abdominal pain, unspecified abdominal location    -  Primary R10.9    Relevant Orders    XR abdomen 1 view               "

## 2025-05-06 ENCOUNTER — APPOINTMENT (OUTPATIENT)
Dept: RADIOLOGY | Facility: HOSPITAL | Age: 60
End: 2025-05-06
Payer: COMMERCIAL

## 2025-05-06 NOTE — TELEPHONE ENCOUNTER
Patient is requesting if Minesh can be seen on the 21st when she is seen as well for their wellness visits. She can be reached at [phone].

## 2025-05-07 ENCOUNTER — TELEPHONE (OUTPATIENT)
Dept: HEMATOLOGY/ONCOLOGY | Facility: CLINIC | Age: 60
End: 2025-05-07
Payer: COMMERCIAL

## 2025-05-07 DIAGNOSIS — E53.8 VITAMIN B12 DEFICIENCY: ICD-10-CM

## 2025-05-07 DIAGNOSIS — D69.6 THROMBOCYTOPENIA (CMS-HCC): ICD-10-CM

## 2025-05-07 RX ORDER — LANOLIN ALCOHOL/MO/W.PET/CERES
1000 CREAM (GRAM) TOPICAL DAILY
Qty: 90 TABLET | Refills: 3 | Status: SHIPPED | OUTPATIENT
Start: 2025-05-07 | End: 2026-05-07

## 2025-05-08 ENCOUNTER — TELEPHONE (OUTPATIENT)
Dept: PRIMARY CARE | Facility: CLINIC | Age: 60
End: 2025-05-08
Payer: COMMERCIAL

## 2025-05-12 ENCOUNTER — APPOINTMENT (OUTPATIENT)
Dept: PRIMARY CARE | Facility: CLINIC | Age: 60
End: 2025-05-12
Payer: COMMERCIAL

## 2025-05-15 ENCOUNTER — TELEPHONE (OUTPATIENT)
Dept: OBSTETRICS AND GYNECOLOGY | Facility: CLINIC | Age: 60
End: 2025-05-15
Payer: COMMERCIAL

## 2025-05-15 NOTE — TELEPHONE ENCOUNTER
Est pt called in stating her partner is being treated for a yeast infection. Pt and partner had intercourse and now pt has redness and irritation. Pt requesting rx for diflucan. Diflucan 150mg PO # 2 tabs with 0 refills called into [facility]

## 2025-05-16 ENCOUNTER — TELEPHONE (OUTPATIENT)
Dept: OBSTETRICS AND GYNECOLOGY | Facility: CLINIC | Age: 60
End: 2025-05-16
Payer: COMMERCIAL

## 2025-05-16 NOTE — TELEPHONE ENCOUNTER
Pt broke one of the diflucan pills in half yesterday and cannot find the other half.  She is requesting another diflucan to be called in to [facility].  Diflucan 150mg #1 as directed with no refill called to pharmacy

## 2025-05-19 ENCOUNTER — TELEPHONE (OUTPATIENT)
Dept: PRIMARY CARE | Facility: CLINIC | Age: 60
End: 2025-05-19
Payer: COMMERCIAL

## 2025-05-19 NOTE — TELEPHONE ENCOUNTER
Mady called office with update that she ate a hot apple pie  that came out the microwave and she didn't let it cool off.  Now she states her throat hurts and she drank some cold tea.  Advised to her that she needs to let the food cool off prior to eating it.  Continue to drink cold water or tea to sooth her throat.    Mady also advised that Minesh had a rag that had Nyastatin on it and accidentally squirted in her nose.  Mady blew her nose.  Mady wants to know if she will be okay, as she has a slight burn feeling.

## 2025-05-21 ENCOUNTER — APPOINTMENT (OUTPATIENT)
Dept: PRIMARY CARE | Facility: CLINIC | Age: 60
End: 2025-05-21
Payer: COMMERCIAL

## 2025-05-21 ENCOUNTER — APPOINTMENT (OUTPATIENT)
Dept: HEMATOLOGY/ONCOLOGY | Facility: CLINIC | Age: 60
End: 2025-05-21
Payer: COMMERCIAL

## 2025-05-25 ENCOUNTER — TELEPHONE (OUTPATIENT)
Dept: OBSTETRICS AND GYNECOLOGY | Facility: HOSPITAL | Age: 60
End: 2025-05-25
Payer: COMMERCIAL

## 2025-05-25 NOTE — TELEPHONE ENCOUNTER
Patient called with complaints of spotting after having intercourse.  She denies any further bleeding.  Patient instructed to call when the office opens if this continues.

## 2025-05-27 DIAGNOSIS — K21.9 GASTROESOPHAGEAL REFLUX DISEASE, UNSPECIFIED WHETHER ESOPHAGITIS PRESENT: ICD-10-CM

## 2025-05-28 ENCOUNTER — OFFICE VISIT (OUTPATIENT)
Dept: PRIMARY CARE | Facility: CLINIC | Age: 60
End: 2025-05-28
Payer: COMMERCIAL

## 2025-05-28 VITALS
SYSTOLIC BLOOD PRESSURE: 135 MMHG | WEIGHT: 141.5 LBS | HEART RATE: 57 BPM | HEIGHT: 59 IN | RESPIRATION RATE: 16 BRPM | OXYGEN SATURATION: 98 % | DIASTOLIC BLOOD PRESSURE: 77 MMHG | BODY MASS INDEX: 28.52 KG/M2 | TEMPERATURE: 98 F

## 2025-05-28 DIAGNOSIS — R07.89 ATYPICAL CHEST PAIN: Primary | ICD-10-CM

## 2025-05-28 DIAGNOSIS — J02.9 PHARYNGITIS, UNSPECIFIED ETIOLOGY: ICD-10-CM

## 2025-05-28 DIAGNOSIS — E53.8 VITAMIN B12 DEFICIENCY: ICD-10-CM

## 2025-05-28 LAB — POC RAPID STREP: NEGATIVE

## 2025-05-28 PROCEDURE — 3008F BODY MASS INDEX DOCD: CPT | Performed by: PEDIATRICS

## 2025-05-28 PROCEDURE — 87880 STREP A ASSAY W/OPTIC: CPT | Performed by: PEDIATRICS

## 2025-05-28 RX ORDER — FAMOTIDINE 40 MG/1
TABLET, FILM COATED ORAL
Qty: 90 TABLET | Refills: 5 | Status: SHIPPED | OUTPATIENT
Start: 2025-05-28

## 2025-05-28 RX ORDER — LANOLIN ALCOHOL/MO/W.PET/CERES
1000 CREAM (GRAM) TOPICAL DAILY
Qty: 90 TABLET | Refills: 3 | Status: SHIPPED | OUTPATIENT
Start: 2025-05-28 | End: 2026-05-28

## 2025-05-28 ASSESSMENT — PAIN SCALES - GENERAL: PAINLEVEL_OUTOF10: 5

## 2025-05-28 NOTE — PROGRESS NOTES
"Subjective   Patient ID: Mady Chapa is a 59 y.o. female who presents for Chest Pain and Migraine.    HPI   Patient had chest pain over the weekend; Ambulance came and did an EKG which was reassuring. Sometimes it felt hard to breathe. That comes and goes. She bumped her head on the wall today. She does call about every day or every other day about bumping her head or chest pain. Sometimes pain is sharp. CT cardiac scoring will be June 6. Stress test was negative this year  Review of Systems  Has a dry throat;   Objective   /77 (BP Location: Left arm, Patient Position: Sitting, BP Cuff Size: Adult)   Pulse 57   Temp 36.7 °C (98 °F) (Temporal)   Resp 16   Ht 1.499 m (4' 11\")   Wt 64.2 kg (141 lb 8 oz)   SpO2 98%   BMI 28.58 kg/m²     Physical Exam  Lungs clear  Heart rRR  Tender chest wall  Assessment/Plan   Problem List Items Addressed This Visit           ICD-10-CM    Atypical chest pain R07.89    Vitamin B12 deficiency E53.8    Relevant Medications    cyanocobalamin (Vitamin B-12) 1,000 mcg tablet    Pharyngitis - Primary J02.9    Relevant Orders    Group A Streptococcus, Culture    POCT Rapid Strep A manually resulted          "

## 2025-05-30 LAB — S PYO THROAT QL CULT: NORMAL

## 2025-06-04 ENCOUNTER — APPOINTMENT (OUTPATIENT)
Dept: OTOLARYNGOLOGY | Facility: CLINIC | Age: 60
End: 2025-06-04
Payer: COMMERCIAL

## 2025-06-06 ENCOUNTER — HOSPITAL ENCOUNTER (OUTPATIENT)
Dept: RADIOLOGY | Facility: CLINIC | Age: 60
End: 2025-06-06
Payer: COMMERCIAL

## 2025-06-06 ENCOUNTER — APPOINTMENT (OUTPATIENT)
Dept: HEMATOLOGY/ONCOLOGY | Facility: CLINIC | Age: 60
End: 2025-06-06
Payer: COMMERCIAL

## 2025-06-07 ENCOUNTER — OFFICE VISIT (OUTPATIENT)
Dept: URGENT CARE | Age: 60
End: 2025-06-07
Payer: COMMERCIAL

## 2025-06-07 VITALS
TEMPERATURE: 97.2 F | HEIGHT: 59 IN | BODY MASS INDEX: 27.62 KG/M2 | WEIGHT: 137 LBS | OXYGEN SATURATION: 95 % | SYSTOLIC BLOOD PRESSURE: 153 MMHG | DIASTOLIC BLOOD PRESSURE: 83 MMHG | HEART RATE: 69 BPM

## 2025-06-07 DIAGNOSIS — H92.01 RIGHT EAR PAIN: ICD-10-CM

## 2025-06-07 DIAGNOSIS — S09.90XA CLOSED HEAD INJURY, INITIAL ENCOUNTER: Primary | ICD-10-CM

## 2025-06-07 NOTE — PROGRESS NOTES
Subjective   Patient ID: Mady Chapa is a 60 y.o. female. They present today with a chief complaint of Earache (Right ear pain after hitting head in the shower about 2-3 hours after).    History of Present Illness  Patient is a pleasant 60-year-old white female, past medical history of hypertension, hyperlipidemia, fibromyalgia, presented to clinic for to complaint of an injury.  Patient states she was taking a shower this morning.  States part of soap fell to the ground when she went to pick it up she bumped her head on her left middle safety bar.  She denies any pain at that time bleeding lightheadedness or dizziness.  Denies any headache.  States about 2 to 3 hours after hitting her head she developed some right ear pain and wants to ensure everything is okay.  She denies any tinnitus.  She continues to deny any current headache lightheadedness dizziness.  No neck pain or stiffness.  No numbness tingling or focal weakness.  No further complaints.        Earache         Past Medical History  Allergies as of 06/07/2025 - Reviewed 06/07/2025   Allergen Reaction Noted    Amitriptyline Angioedema 03/07/2023    Biotin Swelling 10/20/2023    Clindamycin Unknown 03/07/2023    Doxycycline Unknown 09/26/2022    Latex Other 03/07/2023    Metronidazole Diarrhea 08/25/2023    Nortriptyline Unknown 03/07/2023    Omeprazole Other 03/06/2023    Sulfamethoxazole-trimethoprim Unknown 06/08/2023    Vitamin b12-folic acid Swelling 12/19/2024    Cephalexin Rash 06/08/2023    Ciprofloxacin Rash 03/07/2023    Magnesium citrate Rash 06/08/2023    Nitrofurantoin monohyd/m-cryst Rash 03/07/2023    Penicillins Rash 03/07/2023    Sulfa (sulfonamide antibiotics) Rash and Unknown 03/07/2023       Prescriptions Prior to Admission[1]       Medical History[2]    Surgical History[3]     reports that she has never smoked. She has never been exposed to tobacco smoke. She has never used smokeless tobacco. She reports that she does not drink alcohol  "and does not use drugs.    Review of Systems  Review of Systems   HENT:  Positive for ear pain.                                   Objective    Vitals:    06/07/25 1835   BP: 153/83   Pulse: 69   Temp: 36.2 °C (97.2 °F)   SpO2: 95%   Weight: 62.1 kg (137 lb)   Height: 1.499 m (4' 11\")     No LMP recorded. Patient is perimenopausal.    Physical Exam  Constitutional: Alert, oriented, cooperative, in no acute distress. Appears well nourished and well hydrated.    Head: Normocephalic, atraumatic    Skin: Intact, dry skin. No lesions, rash, petechiae, or purpura.    Eyes: PERRL, EOMs intact, conjunctiva pink with no erythema or exudates. No scleral icterus. Eyelids without lesions.    ENT: Hearing grossly intact. No external deformities. Tympanic membranes intact with visible landmarks. Nares patent, mucus membranes moist. Dentition without lesions. Pharynx clear, uvula midline.    Neck: Trachea midline, no lymphadenopathy. No meningismus.     Pulmonary: Lungs clear to auscultation bilaterally with good chest wall excursion. No rales, rhonchi, or wheezing. No accessory muscle use or stridor.     Cardiac: Regular rate and rhythm. Normal S1 and S2 with no murmur, rub, or gallop. No JVD, carotids without bruits. 2+ DP and PT pulses.     Abdomen: Soft, nontender, normoactive bowel sounds. No palpable organomegaly or mass. No rebound or guarding. No CVA tenderness.     Genitourinary: Exam deferred.    Musculoskeletal: Full range of motion in extremities. No pain, edema, or deformities. Peripheral pulses full and equal. No cyanosis, or clubbing.     Neurological: Cranial nerves II through XII are grossly intact. Normal sensation, no weakness, no focal findings identified.     Psychiatric: Appropriate mood and affect. Calm.  Procedures    Point of Care Test & Imaging Results from this visit    Imaging  No results found.    Cardiology, Vascular, and Other Imaging  No other imaging results found for the past 2 days      Diagnostic " study results (if any) were reviewed by Sharan Arizmendi PA-C.    Assessment/Plan   Allergies, medications, history, and pertinent labs/EKGs/Imaging reviewed by Sharan Arizmendi PA-C.     Medical Decision Making  Patient was seen eval the clinic for complaint of head injury with now right ear pain after 2 to 3 hours of hitting her head.  On exam patient is nontoxic well-appearing despite comfortably no acute distress.  Vital signs are stable, afebrile.  Chest is clear, heart is regular, belly is diffusely soft and nontender.  There is no outward signs of head trauma at this time.  There is no swelling ecchymosis or contusion noted on the scalp.  Right ear is unremarkable with no signs of hemotympanums.  She is neurovascular intact throughout.  Minimal concern for acute intracranial process.  Feel ear pain is likely incidental.  Vies take Tylenol for ear pain and apply ice which hit her head.  Discharge home at this time.  Reviewed my impression, plan, strict return report ED precautions with the patient.  She expresses understanding and agreement plan of care.    Orders and Diagnoses  There are no diagnoses linked to this encounter.      Medical Admin Record      Follow Up Instructions  No follow-ups on file.    Patient disposition: Home    Electronically signed by Sharan Arizmendi PA-C  6:43 PM         [1] (Not in a hospital admission)  [2]   Past Medical History:  Diagnosis Date    Blindness of right eye     Body mass index (BMI) 24.0-24.9, adult 10/27/2021    Body mass index (BMI) of 24.0 to 24.9 in adult    CTS (carpal tunnel syndrome) 2022    Depression 1976?    Fibroid     Kidney stone 2018    Low platelet count     Lumbago with sciatica, right side 01/08/2019    Acute right-sided low back pain with right-sided sciatica    Migraine 1990    Other chest pain 03/13/2020    Chest wall pain    Other specified personal risk factors, not elsewhere classified 10/04/2022    At risk for allergic reaction to  medication    Pain in left foot 04/13/2021    Left foot pain    Pain in throat 10/25/2018    Throat pain    Personal history of other diseases of the digestive system 06/20/2022    History of constipation    Personal history of other diseases of the musculoskeletal system and connective tissue 10/20/2021    History of neck pain    Personal history of other diseases of the respiratory system 04/06/2020    History of pharyngitis    Personal history of other diseases of the respiratory system 03/24/2020    History of sore throat    Preglaucoma, unspecified, 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    Superficial foreign body, left foot, sequela 01/17/2022    Foreign body in left foot, sequela    Xerosis cutis 06/26/2020    Dry skin dermatitis   [3]   Past Surgical History:  Procedure Laterality Date    ADENOIDECTOMY  07/31/2013    Adenoidectomy    COLONOSCOPY  07/31/2013    Complete Colonoscopy    EYE SURGERY  07/31/2013    Eye Surgery

## 2025-06-09 ENCOUNTER — TELEPHONE (OUTPATIENT)
Dept: PRIMARY CARE | Facility: CLINIC | Age: 60
End: 2025-06-09
Payer: COMMERCIAL

## 2025-06-09 ENCOUNTER — APPOINTMENT (OUTPATIENT)
Dept: HEMATOLOGY/ONCOLOGY | Facility: CLINIC | Age: 60
End: 2025-06-09
Payer: COMMERCIAL

## 2025-06-09 NOTE — TELEPHONE ENCOUNTER
Patient called advising she has an appointment with Tammy from [facility] on Monday. She stated she also is needing to know if she is allergic to Vitamin B12 and she stated she would like to know what strength of Vitamin D she is needing and if she can get it over the counter or if it is to be sent to [facility] pharmacy.

## 2025-06-09 NOTE — TELEPHONE ENCOUNTER
Dr. Pike Pt. left a voicemail message stating you sent a script for vitamin B  is she allergic to it? Does she have an allergy for it? Can you send a script for vitamin D or tell her how much to get OTC call her back at [phone].

## 2025-06-10 ENCOUNTER — APPOINTMENT (OUTPATIENT)
Dept: RADIOLOGY | Facility: HOSPITAL | Age: 60
End: 2025-06-10
Payer: COMMERCIAL

## 2025-06-10 ENCOUNTER — HOSPITAL ENCOUNTER (EMERGENCY)
Facility: HOSPITAL | Age: 60
Discharge: HOME | End: 2025-06-10
Payer: COMMERCIAL

## 2025-06-10 ENCOUNTER — OFFICE VISIT (OUTPATIENT)
Dept: URGENT CARE | Age: 60
End: 2025-06-10
Payer: COMMERCIAL

## 2025-06-10 ENCOUNTER — TELEPHONE (OUTPATIENT)
Dept: PRIMARY CARE | Facility: CLINIC | Age: 60
End: 2025-06-10
Payer: COMMERCIAL

## 2025-06-10 VITALS
BODY MASS INDEX: 27.66 KG/M2 | OXYGEN SATURATION: 100 % | DIASTOLIC BLOOD PRESSURE: 78 MMHG | HEIGHT: 59 IN | HEART RATE: 62 BPM | SYSTOLIC BLOOD PRESSURE: 139 MMHG | RESPIRATION RATE: 18 BRPM | TEMPERATURE: 98.2 F | WEIGHT: 137.2 LBS

## 2025-06-10 VITALS
SYSTOLIC BLOOD PRESSURE: 137 MMHG | OXYGEN SATURATION: 98 % | TEMPERATURE: 98.2 F | RESPIRATION RATE: 16 BRPM | DIASTOLIC BLOOD PRESSURE: 73 MMHG | HEART RATE: 66 BPM

## 2025-06-10 DIAGNOSIS — S06.0X0A CONCUSSION WITHOUT LOSS OF CONSCIOUSNESS, INITIAL ENCOUNTER: ICD-10-CM

## 2025-06-10 DIAGNOSIS — S09.90XA CLOSED HEAD INJURY, INITIAL ENCOUNTER: Primary | ICD-10-CM

## 2025-06-10 PROCEDURE — 99284 EMERGENCY DEPT VISIT MOD MDM: CPT | Mod: 25

## 2025-06-10 PROCEDURE — 70450 CT HEAD/BRAIN W/O DYE: CPT

## 2025-06-10 PROCEDURE — 2500000001 HC RX 250 WO HCPCS SELF ADMINISTERED DRUGS (ALT 637 FOR MEDICARE OP)

## 2025-06-10 RX ORDER — ACETAMINOPHEN 500 MG
1000 TABLET ORAL ONCE
Status: DISCONTINUED | OUTPATIENT
Start: 2025-06-10 | End: 2025-06-10 | Stop reason: HOSPADM

## 2025-06-10 ASSESSMENT — PAIN DESCRIPTION - DESCRIPTORS: DESCRIPTORS: ACHING

## 2025-06-10 ASSESSMENT — PAIN DESCRIPTION - LOCATION
LOCATION: HEAD
LOCATION: HEAD

## 2025-06-10 ASSESSMENT — PAIN SCALES - GENERAL
PAINLEVEL_OUTOF10: 6
PAINLEVEL_OUTOF10: 6

## 2025-06-10 ASSESSMENT — ENCOUNTER SYMPTOMS
HEADACHES: 1
COUGH: 0
OCCASIONAL FEELINGS OF UNSTEADINESS: 0
LOSS OF SENSATION IN FEET: 0
FEVER: 0
DEPRESSION: 0
CHILLS: 0
FATIGUE: 0
SHORTNESS OF BREATH: 0

## 2025-06-10 ASSESSMENT — PAIN DESCRIPTION - PAIN TYPE
TYPE: ACUTE PAIN
TYPE: ACUTE PAIN

## 2025-06-10 ASSESSMENT — PATIENT HEALTH QUESTIONNAIRE - PHQ9
1. LITTLE INTEREST OR PLEASURE IN DOING THINGS: MORE THAN HALF THE DAYS
2. FEELING DOWN, DEPRESSED OR HOPELESS: MORE THAN HALF THE DAYS
SUM OF ALL RESPONSES TO PHQ9 QUESTIONS 1 AND 2: 4

## 2025-06-10 ASSESSMENT — PAIN - FUNCTIONAL ASSESSMENT: PAIN_FUNCTIONAL_ASSESSMENT: 0-10

## 2025-06-10 NOTE — ED PROVIDER NOTES
HPI   Chief Complaint   Patient presents with    Head Injury     Pt to ED from urgent care after a box of trash bags fell on her head. No LOC, no thinners. Pt with headache which she had prior to the accident. No deficits. Pt just wanted to be checked out to make sure she is ok.        Patient is a 60-year-old female presents emergency department for evaluation of head injury headache.  Patient states that around 1030 this morning a cardboard box full of garbage bags and other supplies fell on top of her head.  She not have any loss of consciousness but has been having a headache and some lightheadedness that she typically gets with her migraines since.  She initially went to urgent care who sent her here for CT imaging of the head.  She denies any fevers, chills, nausea, vomiting, chest pain, shortness of breath, or other injuries at this time feels relatively well otherwise.      History provided by:  Patient   used: No            Patient History   Medical History[1]  Surgical History[2]  Family History[3]  Social History[4]    Physical Exam   ED Triage Vitals [06/10/25 1519]   Temperature Heart Rate Respirations BP   36.8 °C (98.2 °F) 64 18 (!) 128/102      Pulse Ox Temp Source Heart Rate Source Patient Position   100 % Temporal -- Sitting      BP Location FiO2 (%)     Left arm --       Physical Exam  Constitutional:       Appearance: Normal appearance.   HENT:      Head: Normocephalic.      Comments: Tenderness palpation over top of scalp.  Cardiovascular:      Rate and Rhythm: Normal rate and regular rhythm.   Pulmonary:      Effort: Pulmonary effort is normal.      Breath sounds: Normal breath sounds.   Abdominal:      General: Abdomen is flat.      Palpations: Abdomen is soft.      Tenderness: There is no abdominal tenderness.   Musculoskeletal:         General: No swelling, tenderness, deformity or signs of injury. Normal range of motion.   Skin:     General: Skin is warm and dry.    Neurological:      General: No focal deficit present.      Mental Status: She is alert and oriented to person, place, and time.           ED Course & MDM   Diagnoses as of 06/10/25 2031   Closed head injury, initial encounter   Concussion without loss of consciousness, initial encounter                 No data recorded     Mateo Coma Scale Score: 15 (06/10/25 1530 : Marianne Mendoza RN)                           Medical Decision Making  Patient is a 60-year-old female presents emergency department for evaluation of headache and head injury after a box fell on top of her head around 1030 this morning.      Lab work not warranted at today's visit.    Scans done today were interpreted/confirmed by radiologist and also interpreted by me which included CT head without contrast.  CT scan shows no evidence for acute cortical infarct or intracranial hemorrhage with no evidence for display skull fracture.    Medications given at today's visit include p.o. Tylenol    I saw this patient independently.  Patient remained stable while in the emergency department.  The imaging shows no evidence for acute intracranial abnormality or evidence of skull fracture.  Patient was educated on head injury protocol and continue Tylenol and ibuprofen as needed for aches and pains.  To follow-up close with primary care provider outpatient.  She is agreeable to plan and discharge at this time.  Emergent pathologies were considered for this patient, although I have low suspicion for anything acutely emergent given patient's clinical presentation, history, physical exam, stable vital signs, and relatively unremarkable workup.  Discharging patient home is reasonable plan of care for outpatient management.    All labs, imaging, and diagnostic studies were reviewed by me and patient was counseled on clinical impression, expectations, and plan.  Patient was educated to follow-up with PCP in the following 1-2 days.  All questions from patient were  answered. They elicited understanding and were agreeable to course of treatment.  Patient was discharged in stable condition and given strict return precautions.    ** Disclaimer:  Parts of this document were written utilizing a voice to text dictation software.  Note may contain minor transcription or typographical errors that were inadvertently transcribed by the computer software.        Procedure  Procedures         [1]   Past Medical History:  Diagnosis Date    Blindness of right eye     Body mass index (BMI) 24.0-24.9, adult 10/27/2021    Body mass index (BMI) of 24.0 to 24.9 in adult    CTS (carpal tunnel syndrome) 2022    Depression 1976?    Fibroid     Kidney stone 2018    Low platelet count     Lumbago with sciatica, right side 01/08/2019    Acute right-sided low back pain with right-sided sciatica    Migraine 1990    Other chest pain 03/13/2020    Chest wall pain    Other specified personal risk factors, not elsewhere classified 10/04/2022    At risk for allergic reaction to medication    Pain in left foot 04/13/2021    Left foot pain    Pain in throat 10/25/2018    Throat pain    Personal history of other diseases of the digestive system 06/20/2022    History of constipation    Personal history of other diseases of the musculoskeletal system and connective tissue 10/20/2021    History of neck pain    Personal history of other diseases of the respiratory system 04/06/2020    History of pharyngitis    Personal history of other diseases of the respiratory system 03/24/2020    History of sore throat    Preglaucoma, unspecified, 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    Superficial foreign body, left foot, sequela 01/17/2022    Foreign body in left foot, sequela    Xerosis cutis 06/26/2020    Dry skin dermatitis   [2]   Past Surgical History:  Procedure Laterality Date    ADENOIDECTOMY  07/31/2013    Adenoidectomy    COLONOSCOPY  07/31/2013    Complete Colonoscopy    EYE  SURGERY  07/31/2013    Eye Surgery   [3]   Family History  Problem Relation Name Age of Onset    Dementia Mother Ruthie     Kidney failure Mother Ruthie     Hypertension Mother Ruthie     Diabetes Mother Ruthie     Hypertension Father Neptali     Hyperlipidemia Father Neptali     Other (Open heart surgery) Father Neptali     Breast cancer Father Neptali     COPD Father Neptali     Heart disease Father Neptali     Hypertension Sister Tamika     Hyperlipidemia Sister Tamika     Breast cancer Sister Tamika     Breast cancer Father's Brother Jon     Heart disease Maternal Grandfather Don't Remember     Breast cancer Paternal Grandmother Don't  remember     Arthritis Sister Carlota     Mental illness Mother's Sister Jerri    [4]   Social History  Tobacco Use    Smoking status: Never     Passive exposure: Never    Smokeless tobacco: Never   Vaping Use    Vaping status: Never Used   Substance Use Topics    Alcohol use: Never    Drug use: Never        Niki Hurtado PA-C  06/10/25 2030

## 2025-06-10 NOTE — ED TRIAGE NOTES
Pt to ED from urgent care after a box of trash bags fell on her head. No LOC, no thinners. Pt with headache which she had prior to the accident. No deficits. Pt just wanted to be checked out to make sure she is ok.

## 2025-06-10 NOTE — DISCHARGE INSTRUCTIONS
Follow-up closely with primary care provider in the following week.  Continue Tylenol ibuprofen as needed for aches and pains.

## 2025-06-10 NOTE — TELEPHONE ENCOUNTER
Mady called office today states that she was walking and a box of garbage bags( 55 gallon) fell on her head off the table while when bent over in front of the table.  Mady states that she was light headed and went in her room to lay down and called her friend.  Mady denies, vomiting, lost of consciousness, and confusion.  Mady stated she had a migraine prior to the box falling on her head at 10:30 am.  Mady had a migraine when she woke up and took her migraine medication at 9:30 am.    Mady to apply cold compress to affected area of head to help reduce swelling or pain.  Rest, avoid stressful activities,and contact sports, also  have someone with her the next 24 hours. Avoid alcohol and drugs.    Advised Mady if she experiences confusion, vomiting, loss of consciousness, weakness vision changes and numbness to call 911. Mady expressed clear understanding.

## 2025-06-10 NOTE — PROGRESS NOTES
Subjective   Patient ID: Mady Chapa is a 60 y.o. female. They present today with a chief complaint of Head Injury (Pt c/o of head hurt from a box falling on head ).    History of Present Illness  Patient is a 60-year-old female with a history of blindness.  Patient states she hit the top of her head 5 hours prior to arrival.  Patient states she hit her head on a cardboard box.  Patient denies any loss of consciousness, projectile vomiting, altered mental status.  Patient states she had a headache before she hit her head and continues to have a headache.  Patient denies any anticoagulation or blood thinners.      History provided by:  Patient  Head Injury  Associated symptoms: headache        Past Medical History  Allergies as of 06/10/2025 - Reviewed 06/10/2025   Allergen Reaction Noted    Amitriptyline Angioedema 03/07/2023    Biotin Swelling 10/20/2023    Clindamycin Unknown 03/07/2023    Doxycycline Unknown 09/26/2022    Latex Other 03/07/2023    Metronidazole Diarrhea 08/25/2023    Nortriptyline Unknown 03/07/2023    Omeprazole Other 03/06/2023    Sulfamethoxazole-trimethoprim Unknown 06/08/2023    Vitamin b12-folic acid Swelling 12/19/2024    Cephalexin Rash 06/08/2023    Ciprofloxacin Rash 03/07/2023    Magnesium citrate Rash 06/08/2023    Nitrofurantoin monohyd/m-cryst Rash 03/07/2023    Penicillins Rash 03/07/2023    Sulfa (sulfonamide antibiotics) Rash and Unknown 03/07/2023       Prescriptions Prior to Admission[1]     Medical History[2]    Surgical History[3]     reports that she has never smoked. She has never been exposed to tobacco smoke. She has never used smokeless tobacco. She reports that she does not drink alcohol and does not use drugs.    Review of Systems  Review of Systems   Constitutional:  Negative for chills, fatigue and fever.   Respiratory:  Negative for cough and shortness of breath.    Cardiovascular:  Negative for chest pain.   Neurological:  Positive for headaches.                                   Objective    Vitals:    06/10/25 1426   BP: 137/73   Pulse: 66   Resp: 16   Temp: 36.8 °C (98.2 °F)   TempSrc: Oral   SpO2: 98%     No LMP recorded. Patient is perimenopausal.    Physical Exam  Vitals reviewed.   Constitutional:       General: She is not in acute distress.     Appearance: She is not ill-appearing or toxic-appearing.   HENT:      Head: Normocephalic and atraumatic.      Nose: Nose normal.   Eyes:      Extraocular Movements: Extraocular movements intact.   Cardiovascular:      Rate and Rhythm: Normal rate.   Pulmonary:      Effort: Pulmonary effort is normal. No respiratory distress.   Musculoskeletal:         General: No swelling or deformity. Normal range of motion.      Cervical back: Normal range of motion. No rigidity.   Lymphadenopathy:      Cervical: No cervical adenopathy.   Skin:     General: Skin is warm and dry.   Neurological:      Mental Status: She is alert and oriented to person, place, and time.      Motor: No weakness.      Gait: Gait normal.   Psychiatric:         Mood and Affect: Mood normal.         Procedures    Point of Care Test & Imaging Results from this visit  No results found for this visit on 06/10/25.   Imaging  No results found.    Cardiology, Vascular, and Other Imaging  No other imaging results found for the past 2 days      Diagnostic study results (if any) were reviewed by Kisha Fam MD.    Assessment/Plan   Allergies, medications, history, and pertinent labs/EKGs/Imaging reviewed by Kisha Fam MD.     Medical Decision Making  Patient was seen and examined.  Patient was concerned about brain bleed and asked if I could do a CAT scan at urgent care and advised her I could not and if she was concerned she should go to the emergency room.    Orders and Diagnoses  There are no diagnoses linked to this encounter.    Medical Admin Record      Patient disposition: ED    Electronically signed by Kisha Fam MD  2:59 PM           [1] (Not in a  hospital admission)  [2]   Past Medical History:  Diagnosis Date    Blindness of right eye     Body mass index (BMI) 24.0-24.9, adult 10/27/2021    Body mass index (BMI) of 24.0 to 24.9 in adult    CTS (carpal tunnel syndrome) 2022    Depression 1976?    Fibroid     Kidney stone 2018    Low platelet count     Lumbago with sciatica, right side 01/08/2019    Acute right-sided low back pain with right-sided sciatica    Migraine 1990    Other chest pain 03/13/2020    Chest wall pain    Other specified personal risk factors, not elsewhere classified 10/04/2022    At risk for allergic reaction to medication    Pain in left foot 04/13/2021    Left foot pain    Pain in throat 10/25/2018    Throat pain    Personal history of other diseases of the digestive system 06/20/2022    History of constipation    Personal history of other diseases of the musculoskeletal system and connective tissue 10/20/2021    History of neck pain    Personal history of other diseases of the respiratory system 04/06/2020    History of pharyngitis    Personal history of other diseases of the respiratory system 03/24/2020    History of sore throat    Preglaucoma, unspecified, 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    Superficial foreign body, left foot, sequela 01/17/2022    Foreign body in left foot, sequela    Xerosis cutis 06/26/2020    Dry skin dermatitis   [3]   Past Surgical History:  Procedure Laterality Date    ADENOIDECTOMY  07/31/2013    Adenoidectomy    COLONOSCOPY  07/31/2013    Complete Colonoscopy    EYE SURGERY  07/31/2013    Eye Surgery

## 2025-06-11 ENCOUNTER — APPOINTMENT (OUTPATIENT)
Dept: OTOLARYNGOLOGY | Facility: CLINIC | Age: 60
End: 2025-06-11
Payer: COMMERCIAL

## 2025-06-11 VITALS — WEIGHT: 135 LBS | BODY MASS INDEX: 28.34 KG/M2 | HEIGHT: 58 IN

## 2025-06-11 DIAGNOSIS — H61.20 CERUMEN IN AUDITORY CANAL ON EXAMINATION: Primary | ICD-10-CM

## 2025-06-11 PROCEDURE — 1036F TOBACCO NON-USER: CPT | Performed by: OTOLARYNGOLOGY

## 2025-06-11 PROCEDURE — 99203 OFFICE O/P NEW LOW 30 MIN: CPT | Performed by: OTOLARYNGOLOGY

## 2025-06-11 PROCEDURE — 3008F BODY MASS INDEX DOCD: CPT | Performed by: OTOLARYNGOLOGY

## 2025-06-11 NOTE — PROGRESS NOTES
"HPI  Mady Chapa is a 60 y.o. female last seen 2022.  Here for ear check.  She is not really having any symptoms.  She was concerned about wax and a small amount was removed today but not much.  She denies otalgia and otorrhea.  She is not having specific hearing loss.  She is not having otorrhea.      Medical History[1]         Medications:   Current Medications[2]     Allergies:  Allergies[3]     Physical Exam:  Last Recorded Vitals  Height 1.473 m (4' 10\"), weight 61.2 kg (135 lb).  General:     General appearance: Well-developed, well-nourished in no acute distress.       Voice:  normal       Head/face: Normal appearance; nontender to palpation     Facial nerve function: Normal and symmetric bilaterally.    Oral/oropharynx:     Oral vestibule: Normal labial and gingival mucosa     Tongue/floor of mouth: Normal without lesion     Oropharynx: Clear.  No lesions present of the hard/soft palate, posterior pharynx    Neck:     Neck: Normal appearance, trachea midline     Salivary glands: Normal to palpation bilaterally     Lymph nodes: No cervical lymphadenopathy to palpation     Thyroid: No thyromegaly.  No palpable nodules     Range of motion: Normal    Neurological:     Cortical functions: Alert and oriented x3, appropriate affect       Larynx/hypopharynx:     Laryngeal findings: Mirror exam inadequate or limited secondary to enlarged base of tongue and/or excessive gagging    Ear:     Ear canal: Normal bilaterally after cleaning small amount of cerumen bilaterally with wire-loop     Tympanic membrane: Intact and mobile bilaterally     Pinna: Normal bilaterally     Hearing:  Gross hearing assessment normal by voice    Nose:     Visualized using: Anterior rhinoscopy     Nasopharynx: Inadequate mirror exam secondary to gag, anatomy.       Nasal dorsum: Nontraumatic midline appearance     Septum: Midline     Inferior turbinates: Normally sized     Mucosa: Bilateral, pink, normal appearing "       ASSESSMENT/PLAN:  Ears cleaned bilaterally.  Reassured.  Recheck as needed with symptoms        Fady Sterling MD         [1]   Past Medical History:  Diagnosis Date    Blindness of right eye     Body mass index (BMI) 24.0-24.9, adult 10/27/2021    Body mass index (BMI) of 24.0 to 24.9 in adult    CTS (carpal tunnel syndrome) 2022    Depression 1976?    Fibroid     Kidney stone 2018    Low platelet count     Lumbago with sciatica, right side 01/08/2019    Acute right-sided low back pain with right-sided sciatica    Migraine 1990    Other chest pain 03/13/2020    Chest wall pain    Other specified personal risk factors, not elsewhere classified 10/04/2022    At risk for allergic reaction to medication    Pain in left foot 04/13/2021    Left foot pain    Pain in throat 10/25/2018    Throat pain    Personal history of other diseases of the digestive system 06/20/2022    History of constipation    Personal history of other diseases of the musculoskeletal system and connective tissue 10/20/2021    History of neck pain    Personal history of other diseases of the respiratory system 04/06/2020    History of pharyngitis    Personal history of other diseases of the respiratory system 03/24/2020    History of sore throat    Preglaucoma, unspecified, 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    Superficial foreign body, left foot, sequela 01/17/2022    Foreign body in left foot, sequela    Xerosis cutis 06/26/2020    Dry skin dermatitis   [2]   Current Outpatient Medications:     acetaminophen (Tylenol) 500 mg tablet, Take 1 tablet (500 mg) by mouth. TAKE 1 TABLET EVERY 4 TO 6 HOURS AS NEEDED., Disp: , Rfl:     atorvastatin (Lipitor) 20 mg tablet, TAKE 1 TABLET BY MOUTH ONCE  DAILY, Disp: 100 tablet, Rfl: 2    brimonidine-timoloL (Combigan) 0.2-0.5 % ophthalmic solution, , Disp: , Rfl:     butalbital-acetaminophen-caff -40 mg tablet, , Disp: , Rfl:     cholecalciferol (Vitamin D3) 25  MCG (1000 UT) capsule, Take 1 capsule (25 mcg) by mouth once daily., Disp: 90 capsule, Rfl: 1    cyanocobalamin (Vitamin B-12) 1,000 mcg tablet, Take 1 tablet (1,000 mcg) by mouth once daily., Disp: 90 tablet, Rfl: 3    dicyclomine (Bentyl) 20 mg tablet, Take 1 tablet (20 mg) by mouth every 6 hours if needed., Disp: , Rfl:     eletriptan (Relpax) 40 mg tablet, , Disp: , Rfl:     famotidine (Pepcid) 40 mg tablet, TAKE 1 TABLET BY MOUTH TWICE  DAILY FOR 1 MONTH AND THEN TAKE  1 TABLET BY MOUTH ONCE DAILY, Disp: 90 tablet, Rfl: 5    gabapentin (Neurontin) 300 mg capsule, Take 1 capsule (300 mg) by mouth 3 times a day., Disp: 90 capsule, Rfl: 0    latanoprost (Xalatan) 0.005 % ophthalmic solution, Administer into affected eye(s)., Disp: , Rfl:     lidocaine (XYLOCAINE) 4 % gel gel, Apply 4 times a day, Disp: 120 mL, Rfl: 2    loratadine (Claritin) 10 mg tablet, Take 1 tablet (10 mg) by mouth once daily., Disp: , Rfl:     Lumigan 0.01 % ophthalmic solution, , Disp: , Rfl:     meclizine (Antivert) 25 mg tablet, Take 1 tablet (25 mg) by mouth 2 times a day as needed., Disp: , Rfl:     polyethylene glycol (Glycolax, Miralax) 17 gram packet, Take by mouth once daily., Disp: , Rfl:     propranolol XL (Innopran XL) 120 mg 24 hr capsule, Take 1 capsule (120 mg) by mouth early in the morning.., Disp: , Rfl:     psyllium (Metamucil, sugar,) powder, Take by mouth., Disp: , Rfl:     Rocklatan 0.02-0.005 % drops, , Disp: , Rfl:     sodium chloride (Ayr) 0.65 % nasal drops, Administer 1-2 drops into affected nostril(s). 1-2 sprays in each nostril 2-3 times/day as needed, Disp: , Rfl:   No current facility-administered medications for this visit.  [3]   Allergies  Allergen Reactions    Amitriptyline Angioedema    Biotin Swelling    Clindamycin Unknown    Doxycycline Unknown    Latex Other    Metronidazole Diarrhea    Nortriptyline Unknown    Omeprazole Other     Tongue swelling    Sulfamethoxazole-Trimethoprim Unknown    Vitamin  B12-Folic Acid Swelling     Tongue swelling per patient    Cephalexin Rash    Ciprofloxacin Rash    Magnesium Citrate Rash    Nitrofurantoin Monohyd/M-Cryst Rash    Penicillins Rash    Sulfa (Sulfonamide Antibiotics) Rash and Unknown

## 2025-06-12 ENCOUNTER — TELEPHONE (OUTPATIENT)
Dept: PRIMARY CARE | Facility: CLINIC | Age: 60
End: 2025-06-12
Payer: COMMERCIAL

## 2025-06-12 NOTE — TELEPHONE ENCOUNTER
Patient called advising she was told she cannot see neuro until next month and she requested if you wanted to see her sooner instead. She can be reached at [phone].

## 2025-06-20 ENCOUNTER — TELEPHONE (OUTPATIENT)
Dept: PRIMARY CARE | Facility: CLINIC | Age: 60
End: 2025-06-20
Payer: COMMERCIAL

## 2025-06-20 DIAGNOSIS — R05.9 COUGH, UNSPECIFIED TYPE: Primary | ICD-10-CM

## 2025-06-20 RX ORDER — BENZONATATE 200 MG/1
200 CAPSULE ORAL 3 TIMES DAILY PRN
Qty: 42 CAPSULE | Refills: 0 | Status: SHIPPED | OUTPATIENT
Start: 2025-06-20 | End: 2025-07-20

## 2025-06-20 NOTE — TELEPHONE ENCOUNTER
Mady called office today with compliant of cough and would like to know if you can send something to her local pharmacy.

## 2025-06-20 NOTE — TELEPHONE ENCOUNTER
Mady returned call to office to advised that she has a fever of 100.2  she said she took Tylenol over the counter 2 tablets and she will take 2 more today for her fever.

## 2025-06-20 NOTE — TELEPHONE ENCOUNTER
Mady called office again wanted to know if you can call in a script for a cough,(thick phlegm clear/white) as she stated that she had a fever yesterday 99.6,today no fever.     Mady request a return call.    [facility] on [facility]

## 2025-06-21 ENCOUNTER — APPOINTMENT (OUTPATIENT)
Dept: RADIOLOGY | Facility: HOSPITAL | Age: 60
End: 2025-06-21
Payer: COMMERCIAL

## 2025-06-21 ENCOUNTER — HOSPITAL ENCOUNTER (EMERGENCY)
Facility: HOSPITAL | Age: 60
Discharge: HOME | End: 2025-06-21
Payer: COMMERCIAL

## 2025-06-21 VITALS
HEART RATE: 82 BPM | SYSTOLIC BLOOD PRESSURE: 141 MMHG | BODY MASS INDEX: 28.55 KG/M2 | WEIGHT: 136 LBS | HEIGHT: 58 IN | TEMPERATURE: 98.6 F | RESPIRATION RATE: 18 BRPM | DIASTOLIC BLOOD PRESSURE: 86 MMHG | OXYGEN SATURATION: 100 %

## 2025-06-21 DIAGNOSIS — J18.9 PNEUMONIA DUE TO INFECTIOUS ORGANISM, UNSPECIFIED LATERALITY, UNSPECIFIED PART OF LUNG: ICD-10-CM

## 2025-06-21 DIAGNOSIS — G43.809 OTHER MIGRAINE WITHOUT STATUS MIGRAINOSUS, NOT INTRACTABLE: Primary | ICD-10-CM

## 2025-06-21 LAB
FLUAV RNA RESP QL NAA+PROBE: NOT DETECTED
FLUBV RNA RESP QL NAA+PROBE: NOT DETECTED
RSV RNA RESP QL NAA+PROBE: NOT DETECTED
SARS-COV-2 RNA RESP QL NAA+PROBE: NOT DETECTED

## 2025-06-21 PROCEDURE — 71046 X-RAY EXAM CHEST 2 VIEWS: CPT | Performed by: STUDENT IN AN ORGANIZED HEALTH CARE EDUCATION/TRAINING PROGRAM

## 2025-06-21 PROCEDURE — 99285 EMERGENCY DEPT VISIT HI MDM: CPT | Mod: 25

## 2025-06-21 PROCEDURE — 70450 CT HEAD/BRAIN W/O DYE: CPT

## 2025-06-21 PROCEDURE — 2500000001 HC RX 250 WO HCPCS SELF ADMINISTERED DRUGS (ALT 637 FOR MEDICARE OP): Performed by: PHYSICIAN ASSISTANT

## 2025-06-21 PROCEDURE — 71046 X-RAY EXAM CHEST 2 VIEWS: CPT

## 2025-06-21 PROCEDURE — 87637 SARSCOV2&INF A&B&RSV AMP PRB: CPT | Performed by: PHYSICIAN ASSISTANT

## 2025-06-21 PROCEDURE — 70450 CT HEAD/BRAIN W/O DYE: CPT | Performed by: RADIOLOGY

## 2025-06-21 RX ORDER — LEVOFLOXACIN 750 MG/1
750 TABLET, FILM COATED ORAL EVERY 24 HOURS
Qty: 4 TABLET | Refills: 0 | Status: SHIPPED | OUTPATIENT
Start: 2025-06-21 | End: 2025-06-25

## 2025-06-21 RX ORDER — IBUPROFEN 600 MG/1
600 TABLET, FILM COATED ORAL ONCE
Status: DISCONTINUED | OUTPATIENT
Start: 2025-06-21 | End: 2025-06-21 | Stop reason: HOSPADM

## 2025-06-21 RX ORDER — ACETAMINOPHEN 325 MG/1
650 TABLET ORAL EVERY 6 HOURS PRN
Qty: 30 TABLET | Refills: 0 | Status: SHIPPED | OUTPATIENT
Start: 2025-06-21 | End: 2025-07-01

## 2025-06-21 RX ORDER — LEVOFLOXACIN 750 MG/1
750 TABLET, FILM COATED ORAL ONCE
Status: DISCONTINUED | OUTPATIENT
Start: 2025-06-21 | End: 2025-06-21 | Stop reason: HOSPADM

## 2025-06-21 RX ORDER — ACETAMINOPHEN 325 MG/1
975 TABLET ORAL ONCE
Status: COMPLETED | OUTPATIENT
Start: 2025-06-21 | End: 2025-06-21

## 2025-06-21 RX ORDER — DIPHENHYDRAMINE HCL 25 MG
25 TABLET ORAL ONCE
Status: DISCONTINUED | OUTPATIENT
Start: 2025-06-21 | End: 2025-06-21 | Stop reason: HOSPADM

## 2025-06-21 RX ORDER — PROCHLORPERAZINE MALEATE 10 MG
10 TABLET ORAL ONCE AS NEEDED
Status: DISCONTINUED | OUTPATIENT
Start: 2025-06-21 | End: 2025-06-21 | Stop reason: HOSPADM

## 2025-06-21 RX ORDER — ALBUTEROL SULFATE 90 UG/1
2 INHALANT RESPIRATORY (INHALATION) EVERY 4 HOURS PRN
Qty: 18 G | Refills: 0 | Status: SHIPPED | OUTPATIENT
Start: 2025-06-21 | End: 2025-07-21

## 2025-06-21 RX ADMIN — ACETAMINOPHEN 975 MG: 325 TABLET ORAL at 14:41

## 2025-06-21 ASSESSMENT — PAIN - FUNCTIONAL ASSESSMENT: PAIN_FUNCTIONAL_ASSESSMENT: 0-10

## 2025-06-21 ASSESSMENT — PAIN SCALES - GENERAL: PAINLEVEL_OUTOF10: 7

## 2025-06-21 ASSESSMENT — PAIN DESCRIPTION - PAIN TYPE: TYPE: ACUTE PAIN

## 2025-06-21 ASSESSMENT — PAIN DESCRIPTION - LOCATION: LOCATION: HEAD

## 2025-06-21 NOTE — ED PROVIDER NOTES
HPI   Chief Complaint   Patient presents with    Headache       60-year-old female presented emergency department with a chief complaint of cough congestion, headache.  Been for several days.  Complains of fevers.  Afebrile here.  Taken no antipyretic prior to arrival.  She denies chest pain or pressure.  Denies shortness of breath.  Denies abdominal pain dysuria diarrhea.  No other complaint.              Patient History   Medical History[1]  Surgical History[2]  Family History[3]  Social History[4]    Physical Exam   ED Triage Vitals   Temperature Heart Rate Respirations BP   06/21/25 1344 06/21/25 1343 06/21/25 1343 06/21/25 1343   37 °C (98.6 °F) 82 18 141/86      Pulse Ox Temp src Heart Rate Source Patient Position   06/21/25 1343 -- -- --   99 %         BP Location FiO2 (%)     -- --             Physical Exam  Vitals and nursing note reviewed.   Constitutional:       Appearance: She is well-developed.   HENT:      Head: Normocephalic.      Mouth/Throat:      Mouth: Mucous membranes are moist.   Cardiovascular:      Rate and Rhythm: Normal rate and regular rhythm.   Pulmonary:      Comments: Mild rhonchi left lower lung  Abdominal:      Palpations: Abdomen is soft.   Musculoskeletal:         General: Normal range of motion.      Cervical back: Normal range of motion.   Skin:     General: Skin is warm and dry.   Neurological:      Mental Status: She is alert and oriented to person, place, and time.      GCS: GCS eye subscore is 4. GCS verbal subscore is 5. GCS motor subscore is 6.      Cranial Nerves: No cranial nerve deficit or dysarthria.      Sensory: No sensory deficit.   Psychiatric:         Mood and Affect: Mood normal.         Behavior: Behavior normal.           ED Course & MDM   Diagnoses as of 06/21/25 1455   Other migraine without status migrainosus, not intractable   Pneumonia due to infectious organism, unspecified laterality, unspecified part of lung                 No data recorded     [place]  Coma Scale Score: 15 (06/21/25 1347 : Joy Story, TRICE)                           Medical Decision Making  I have seen and evaluated this patient.  Physician available for consultation.  Vital signs have been reviewed.  All laboratory and diagnostic imaging is reviewed by myself and interpreted by myself unless otherwise stated.  Additionally imaging is interpreted by radiologist.    Negative CT head, chest x-ray concerning for pneumonia.  Physical examination with abnormal breath sounds left lower lobe.  Patient with extensive antibiotic allergy.  Given dose of Levaquin and monitored.  Tolerated.  Released in stable condition.            Labs Reviewed   SARS-COV-2, INFLUENZA A/B AND RSV PCR - Normal       Result Value    Coronavirus 2019, PCR Not Detected      Flu A Result Not Detected      Flu B Result Not Detected      RSV PCR Not Detected      Narrative:     This assay is an FDA-cleared, in vitro diagnostic nucleic acid amplification test for the qualitative detection and differentiation of SARS CoV-2/ Influenza A/B/ RSV from nasopharyngeal specimens collected from individuals with signs and symptoms of respiratory tract infections, and has been validated for use at [facility]. Negative results do not preclude COVID-19/ Influenza A/B/ RSV infections and should not be used as the sole basis for diagnosis, treatment, or other management decisions. Testing for SARS CoV-2 is recommended only for patients who meet current clinical and/or epidemiological criteria defined by federal, state, or local public health directives.     CT head wo IV contrast   Final Result   No acute intracranial hemorrhage or large territory infarction is   evident.        Signed by: Deuce Chen 6/21/2025 2:39 PM   Dictation workstation:   [id number]      XR chest 2 views   Final Result   Left basilar reticular airspace opacity, likely favored to represent   atelectasis. However developing pneumonia can also  be considered in   the differential in appropriate clinical setting.                  MACRO:   None        Signed by: Joellen Desir 6/21/2025 2:09 PM   Dictation workstation:   [id number]        Medications   ibuprofen tablet 600 mg (600 mg oral Not Given 6/21/25 1444)   levoFLOXacin (Levaquin) tablet 750 mg (750 mg oral Not Given 6/21/25 1444)   diphenhydrAMINE (Sominex) tablet 25 mg (25 mg oral Not Given 6/21/25 1445)   prochlorperazine (Compazine) tablet 10 mg (has no administration in time range)   acetaminophen (Tylenol) tablet 975 mg (975 mg oral Given 6/21/25 1441)     New Prescriptions    ACETAMINOPHEN (TYLENOL) 325 MG TABLET    Take 2 tablets (650 mg) by mouth every 6 hours if needed for mild pain (1 - 3) for up to 10 days.    ALBUTEROL 90 MCG/ACTUATION INHALER    Inhale 2 puffs every 4 hours if needed for wheezing.    LEVOFLOXACIN (LEVAQUIN) 750 MG TABLET    Take 1 tablet (750 mg) by mouth once every 24 hours for 4 days.         Procedure  Procedures         [1]   Past Medical History:  Diagnosis Date    Blindness of right eye     Body mass index (BMI) 24.0-24.9, adult 10/27/2021    Body mass index (BMI) of 24.0 to 24.9 in adult    CTS (carpal tunnel syndrome) 2022    Depression 1976?    Fibroid     Kidney stone 2018    Low platelet count     Lumbago with sciatica, right side 01/08/2019    Acute right-sided low back pain with right-sided sciatica    Migraine 1990    Other chest pain 03/13/2020    Chest wall pain    Other specified personal risk factors, not elsewhere classified 10/04/2022    At risk for allergic reaction to medication    Pain in left foot 04/13/2021    Left foot pain    Pain in throat 10/25/2018    Throat pain    Personal history of other diseases of the digestive system 06/20/2022    History of constipation    Personal history of other diseases of the musculoskeletal system and connective tissue 10/20/2021    History of neck pain    Personal history of other diseases of the  respiratory system 04/06/2020    History of pharyngitis    Personal history of other diseases of the respiratory system 03/24/2020    History of sore throat    Preglaucoma, unspecified, 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    Superficial foreign body, left foot, sequela 01/17/2022    Foreign body in left foot, sequela    Xerosis cutis 06/26/2020    Dry skin dermatitis   [2]   Past Surgical History:  Procedure Laterality Date    ADENOIDECTOMY  07/31/2013    Adenoidectomy    COLONOSCOPY  07/31/2013    Complete Colonoscopy    EYE SURGERY  07/31/2013    Eye Surgery   [3]   Family History  Problem Relation Name Age of Onset    Dementia Mother Ruthie     Kidney failure Mother Ruthie     Hypertension Mother Ruthie     Diabetes Mother Ruthie     Hypertension Father Neptali     Hyperlipidemia Father Neptali     Other (Open heart surgery) Father Neptali     Breast cancer Father Neptali     COPD Father Neptali     Heart disease Father Neptali     Hypertension Sister Tamika     Hyperlipidemia Sister Tamika     Breast cancer Sister Tamika     Breast cancer Father's Brother Jon     Heart disease Maternal Grandfather Don't Remember     Breast cancer Paternal Grandmother Don't  remember     Arthritis Sister Carlota     Mental illness Mother's Sister Jerri    [4]   Social History  Tobacco Use    Smoking status: Never     Passive exposure: Never    Smokeless tobacco: Never   Vaping Use    Vaping status: Never Used   Substance Use Topics    Alcohol use: Never    Drug use: Never        Vincenzo Vazquez PA-C  06/21/25 8781

## 2025-06-22 ENCOUNTER — HOSPITAL ENCOUNTER (EMERGENCY)
Facility: HOSPITAL | Age: 60
Discharge: HOME | End: 2025-06-22
Payer: COMMERCIAL

## 2025-06-22 ENCOUNTER — HOSPITAL ENCOUNTER (EMERGENCY)
Facility: HOSPITAL | Age: 60
Discharge: HOME | End: 2025-06-22
Attending: EMERGENCY MEDICINE
Payer: COMMERCIAL

## 2025-06-22 VITALS
HEIGHT: 58 IN | OXYGEN SATURATION: 99 % | BODY MASS INDEX: 28.55 KG/M2 | DIASTOLIC BLOOD PRESSURE: 77 MMHG | RESPIRATION RATE: 18 BRPM | SYSTOLIC BLOOD PRESSURE: 132 MMHG | HEART RATE: 62 BPM | TEMPERATURE: 96.1 F | WEIGHT: 136 LBS

## 2025-06-22 VITALS
SYSTOLIC BLOOD PRESSURE: 141 MMHG | WEIGHT: 136 LBS | DIASTOLIC BLOOD PRESSURE: 61 MMHG | OXYGEN SATURATION: 98 % | RESPIRATION RATE: 14 BRPM | TEMPERATURE: 97 F | HEIGHT: 58 IN | BODY MASS INDEX: 28.55 KG/M2 | HEART RATE: 83 BPM

## 2025-06-22 DIAGNOSIS — R51.9 ACUTE NONINTRACTABLE HEADACHE, UNSPECIFIED HEADACHE TYPE: Primary | ICD-10-CM

## 2025-06-22 DIAGNOSIS — R05.1 ACUTE COUGH: Primary | ICD-10-CM

## 2025-06-22 PROCEDURE — 99281 EMR DPT VST MAYX REQ PHY/QHP: CPT | Performed by: EMERGENCY MEDICINE

## 2025-06-22 PROCEDURE — 99281 EMR DPT VST MAYX REQ PHY/QHP: CPT | Mod: 27

## 2025-06-22 RX ORDER — ACETAMINOPHEN 325 MG/1
650 TABLET ORAL ONCE
Status: DISCONTINUED | OUTPATIENT
Start: 2025-06-22 | End: 2025-06-22 | Stop reason: HOSPADM

## 2025-06-22 ASSESSMENT — PAIN DESCRIPTION - PAIN TYPE: TYPE: ACUTE PAIN

## 2025-06-22 ASSESSMENT — LIFESTYLE VARIABLES
EVER FELT BAD OR GUILTY ABOUT YOUR DRINKING: NO
EVER HAD A DRINK FIRST THING IN THE MORNING TO STEADY YOUR NERVES TO GET RID OF A HANGOVER: NO
HAVE YOU EVER FELT YOU SHOULD CUT DOWN ON YOUR DRINKING: NO
HAVE PEOPLE ANNOYED YOU BY CRITICIZING YOUR DRINKING: NO
TOTAL SCORE: 0

## 2025-06-22 ASSESSMENT — PAIN SCALES - GENERAL
PAINLEVEL_OUTOF10: 4
PAINLEVEL_OUTOF10: 3

## 2025-06-22 ASSESSMENT — PAIN DESCRIPTION - LOCATION: LOCATION: HEAD

## 2025-06-22 ASSESSMENT — PAIN - FUNCTIONAL ASSESSMENT
PAIN_FUNCTIONAL_ASSESSMENT: 0-10
PAIN_FUNCTIONAL_ASSESSMENT: 0-10

## 2025-06-22 NOTE — ED TRIAGE NOTES
"PT c.o cough since a dx of Pneumonia yesterday. PT taking antibiotics. Pt states she vomited \"ice cream\" today, and then c.o right lower abd pain.  "

## 2025-06-22 NOTE — ED PROVIDER NOTES
HPI   Chief Complaint   Patient presents with    Cough       60-year-old female presented emergency department with a chief complaint of cough.  I saw this patient yesterday for cough and diagnosed with pneumonia started on antibiotic.  She is afebrile well-appearing nontoxic not hypoxic not tachycardic.  She states that she coughed on some food earlier and was visiting a friend in the hospital so decided to check into the emergency department.              Patient History   Medical History[1]  Surgical History[2]  Family History[3]  Social History[4]    Physical Exam   ED Triage Vitals [06/22/25 1747]   Temperature Heart Rate Respirations BP   36.1 °C (97 °F) 83 14 141/61      Pulse Ox Temp Source Heart Rate Source Patient Position   98 % Temporal -- --      BP Location FiO2 (%)     -- --       Physical Exam  Vitals and nursing note reviewed.   Constitutional:       Appearance: Normal appearance.   HENT:      Head: Normocephalic.      Nose: Nose normal.      Mouth/Throat:      Mouth: Mucous membranes are moist.   Cardiovascular:      Rate and Rhythm: Normal rate and regular rhythm.   Pulmonary:      Effort: Pulmonary effort is normal.      Breath sounds: Normal breath sounds.   Abdominal:      General: Abdomen is flat.      Palpations: Abdomen is soft.   Musculoskeletal:         General: Normal range of motion.   Skin:     General: Skin is warm.   Neurological:      General: No focal deficit present.      Mental Status: She is alert. Mental status is at baseline.   Psychiatric:         Mood and Affect: Mood normal.           ED Course & MDM   Diagnoses as of 06/22/25 7988   Acute cough                 No data recorded     [place] Coma Scale Score: 15 (06/22/25 1751 : Jayda Coyle RN)                           Medical Decision Making  I have seen and evaluated this patient.  Physician available for consultation.  Vital signs have been reviewed.  All laboratory and diagnostic imaging is reviewed by myself and  interpreted by myself unless otherwise stated.  Additionally imaging is interpreted by radiologist.    Is currently on antibiotic.  She has no oxygen requirement she is afebrile.  There is no indication she eric-ray this patient for her cough when she had an x-ray 24 hours ago.  She is discharged from an emergent standpoint with outpatient follow-up.    Labs Reviewed - No data to display  No orders to display  Medications - No data to display  Discharge Medication List as of 6/22/2025  6:02 PM                Procedure  Procedures         [1]   Past Medical History:  Diagnosis Date    Blindness of right eye     Body mass index (BMI) 24.0-24.9, adult 10/27/2021    Body mass index (BMI) of 24.0 to 24.9 in adult    CTS (carpal tunnel syndrome) 2022    Depression 1976?    Fibroid     Kidney stone 2018    Low platelet count     Lumbago with sciatica, right side 01/08/2019    Acute right-sided low back pain with right-sided sciatica    Migraine 1990    Other chest pain 03/13/2020    Chest wall pain    Other specified personal risk factors, not elsewhere classified 10/04/2022    At risk for allergic reaction to medication    Pain in left foot 04/13/2021    Left foot pain    Pain in throat 10/25/2018    Throat pain    Personal history of other diseases of the digestive system 06/20/2022    History of constipation    Personal history of other diseases of the musculoskeletal system and connective tissue 10/20/2021    History of neck pain    Personal history of other diseases of the respiratory system 04/06/2020    History of pharyngitis    Personal history of other diseases of the respiratory system 03/24/2020    History of sore throat    Preglaucoma, unspecified, 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    Superficial foreign body, left foot, sequela 01/17/2022    Foreign body in left foot, sequela    Xerosis cutis 06/26/2020    Dry skin dermatitis   [2]   Past Surgical History:  Procedure  Laterality Date    ADENOIDECTOMY  07/31/2013    Adenoidectomy    COLONOSCOPY  07/31/2013    Complete Colonoscopy    EYE SURGERY  07/31/2013    Eye Surgery   [3]   Family History  Problem Relation Name Age of Onset    Dementia Mother Ruthie     Kidney failure Mother Ruthie     Hypertension Mother Ruthie     Diabetes Mother Ruthie     Hypertension Father Neptali     Hyperlipidemia Father Neptali     Other (Open heart surgery) Father Neptali     Breast cancer Father Neptali     COPD Father Neptali     Heart disease Father Neptali     Hypertension Sister Tamika     Hyperlipidemia Sister Tamika     Breast cancer Sister Tamika     Breast cancer Father's Brother Jon     Heart disease Maternal Grandfather Don't Remember     Breast cancer Paternal Grandmother Don't  remember     Arthritis Sister Carlota     Mental illness Mother's Sister Jerri    [4]   Social History  Tobacco Use    Smoking status: Never     Passive exposure: Never    Smokeless tobacco: Never   Vaping Use    Vaping status: Never Used   Substance Use Topics    Alcohol use: Never    Drug use: Never        Vincenzo Vazquez PA-C  06/22/25 2034

## 2025-06-22 NOTE — ED PROVIDER NOTES
EMERGENCY DEPARTMENT ENCOUNTER      Pt Name: Mady Chapa  MRN: [id number]  Birthdate 1965  Date of evaluation: 6/22/2025  ED Provider: Sharon Ochoa DO     CHIEF COMPLAINT       Chief Complaint   Patient presents with    Head Injury     Pt comes into the ER with a chief complaint of a head injury. Pt says she believes she hit her head during the night sleeping in a recliner upstairs. Pt does not know if she had any LOC and denies taking any blood thinner. Pt says the pain is on the left side and hurts when she turns her head.       HISTORY OF PRESENT ILLNESS    Mady Chapa is a 60 y.o. who presents to the emergency department from a room upstairs where she is sleeping the recliner will standing with her significant other that is hospitalized concerned that she may have hit her head.  She states she was sleeping in the recliner.  She did not actually fall out of the recliner but does not know if she might have bumped her head against the armrest in her sleep.  When she woke up she states she had head and neck pain.  She is not on any blood thinners.  She has not take anything for this.  Of note she was seen yesterday and diagnosed with pneumonia and started on Levaquin.  She is in no acute respiratory distress but states that she is still coughing.    REVIEW OF SYSTEMS     Focused ROS performed and negative other than as listed in HPI    PAST MEDICAL HISTORY   Medical History[1]    SURGICAL HISTORY     Surgical History[2]    CURRENT MEDICATIONS       Previous Medications    ACETAMINOPHEN (TYLENOL) 325 MG TABLET    Take 2 tablets (650 mg) by mouth every 6 hours if needed for mild pain (1 - 3) for up to 10 days.    ACETAMINOPHEN (TYLENOL) 500 MG TABLET    Take 1 tablet (500 mg) by mouth. TAKE 1 TABLET EVERY 4 TO 6 HOURS AS NEEDED.    ALBUTEROL 90 MCG/ACTUATION INHALER    Inhale 2 puffs every 4 hours if needed for wheezing.    ATORVASTATIN (LIPITOR) 20 MG TABLET    TAKE 1 TABLET BY MOUTH ONCE  DAILY    BENZONATATE  (TESSALON) 200 MG CAPSULE    Take 1 capsule (200 mg) by mouth 3 times a day as needed for cough. Do not crush or chew.    BRIMONIDINE-TIMOLOL (COMBIGAN) 0.2-0.5 % OPHTHALMIC SOLUTION        BUTALBITAL-ACETAMINOPHEN-CAFF -40 MG TABLET        CHOLECALCIFEROL (VITAMIN D3) 25 MCG (1000 UT) CAPSULE    Take 1 capsule (25 mcg) by mouth once daily.    CYANOCOBALAMIN (VITAMIN B-12) 1,000 MCG TABLET    Take 1 tablet (1,000 mcg) by mouth once daily.    DICYCLOMINE (BENTYL) 20 MG TABLET    Take 1 tablet (20 mg) by mouth every 6 hours if needed.    ELETRIPTAN (RELPAX) 40 MG TABLET        FAMOTIDINE (PEPCID) 40 MG TABLET    TAKE 1 TABLET BY MOUTH TWICE  DAILY FOR 1 MONTH AND THEN TAKE  1 TABLET BY MOUTH ONCE DAILY    GABAPENTIN (NEURONTIN) 300 MG CAPSULE    Take 1 capsule (300 mg) by mouth 3 times a day.    LATANOPROST (XALATAN) 0.005 % OPHTHALMIC SOLUTION    Administer into affected eye(s).    LEVOFLOXACIN (LEVAQUIN) 750 MG TABLET    Take 1 tablet (750 mg) by mouth once every 24 hours for 4 days.    LEVOFLOXACIN (LEVAQUIN) 750 MG TABLET    Take 1 tablet (750 mg) by mouth once every 24 hours for 4 days.    LIDOCAINE (XYLOCAINE) 4 % GEL GEL    Apply 4 times a day    LORATADINE (CLARITIN) 10 MG TABLET    Take 1 tablet (10 mg) by mouth once daily.    LUMIGAN 0.01 % OPHTHALMIC SOLUTION        MECLIZINE (ANTIVERT) 25 MG TABLET    Take 1 tablet (25 mg) by mouth 2 times a day as needed.    POLYETHYLENE GLYCOL (GLYCOLAX, MIRALAX) 17 GRAM PACKET    Take by mouth once daily.    PROPRANOLOL XL (INNOPRAN XL) 120 MG 24 HR CAPSULE    Take 1 capsule (120 mg) by mouth early in the morning..    PSYLLIUM (METAMUCIL, SUGAR,) POWDER    Take by mouth.    ROCKLATAN 0.02-0.005 % DROPS        SODIUM CHLORIDE (AYR) 0.65 % NASAL DROPS    Administer 1-2 drops into affected nostril(s). 1-2 sprays in each nostril 2-3 times/day as needed       ALLERGIES     Amitriptyline, Biotin, Clindamycin, Doxycycline, Latex, Metronidazole, Nortriptyline, Omeprazole,  "Sulfamethoxazole-trimethoprim, Vitamin b12-folic acid, Cephalexin, Ciprofloxacin, Magnesium citrate, Nitrofurantoin monohyd/m-cryst, Penicillins, and Sulfa (sulfonamide antibiotics)    FAMILY HISTORY     Family History[3]     SOCIAL HISTORY     Social History[4]    PHYSICAL EXAM       ED Triage Vitals [06/22/25 0606]   Temperature Heart Rate Respirations BP   35.6 °C (96.1 °F) 62 18 132/77      Pulse Ox Temp Source Heart Rate Source Patient Position   99 % Tympanic Monitor Sitting      BP Location FiO2 (%)     Left arm --        General: Appears chronically ill but nontoxic no acute distress, alert  Head: Head atraumatic; normocephalic, tenderness to light palpation on the left superior parietal aspect of the head without any obvious cephalhematoma, ecchymosis, contusion, laceration  Eyes: normal inspection; no icterus  ENT: mucosa moist without lesion  Neck: Normal inspection, no meningeal signs  Resp: Normal breath sounds, no wheeze or crackles; No respiratory distress  Chest Wall: no tenderness or deformity  Heart: Heart rate and rhythm regular; No Murmurs  Abdomen: Soft, Non-tender; No distention, guarding, rigidity, or rebound  MSK: Normal appearance; Moves all extremities; No Pedal edema  Neuro: Alert; no focal deficits, moves all extremities  Psych: Mood and Affect normal  Skin: Color appropriate; warm; Dry    EMERGENCY DEPARTMENT COURSE/MDM   Patient presents with question of mild head injury.  She does not actually know if she hit her head and even if she did it was not forceful enough to awaken her from her sleep.  Do not feel that CT scan imaging is required at this time.  She is given a dose of Tylenol however she is hesitant to take this as she had \"975 mg yesterday\".  She is advised that having another dose today is still acceptable.  She is to follow-up with her primary care.  Discharged in stable condition.    Diagnoses as of 06/22/25 0624   Acute nonintractable headache, unspecified headache type "       Meds Administered:  Medications   acetaminophen (Tylenol) tablet 650 mg (has no administration in time range)       PROCEDURES   Unless otherwise noted below, none  Procedures      FINAL IMPRESSION      1. Acute nonintractable headache, unspecified headache type          DISPOSITION    Discharge 06/22/2025 06:21:26 AM  As a result of the work-up, the patient was discharged home.  she was informed of her diagnosis and instructed to come back with any concerns or worsening of condition.  she and was agreeable to the plan as discussed above.  she was given the opportunity to ask questions.  All of the patient's questions were answered.    Critical Care time: Not Indicated    (Comment: Please note this report has been produced using speech recognition software and may contain errors related to that system including errors in grammar, punctuation, and spelling, as well as words and phrases that may be inappropriate.  If there are any questions or concerns please feel free to contact the dictating provider for clarification.)    Sharon Ochoa DO, MPH (electronically signed)  Emergency Medicine Physician               [1]   Past Medical History:  Diagnosis Date    Blindness of right eye     Body mass index (BMI) 24.0-24.9, adult 10/27/2021    Body mass index (BMI) of 24.0 to 24.9 in adult    CTS (carpal tunnel syndrome) 2022    Depression 1976?    Fibroid     Kidney stone 2018    Low platelet count     Lumbago with sciatica, right side 01/08/2019    Acute right-sided low back pain with right-sided sciatica    Migraine 1990    Other chest pain 03/13/2020    Chest wall pain    Other specified personal risk factors, not elsewhere classified 10/04/2022    At risk for allergic reaction to medication    Pain in left foot 04/13/2021    Left foot pain    Pain in throat 10/25/2018    Throat pain    Personal history of other diseases of the digestive system 06/20/2022    History of constipation    Personal history of other  diseases of the musculoskeletal system and connective tissue 10/20/2021    History of neck pain    Personal history of other diseases of the respiratory system 04/06/2020    History of pharyngitis    Personal history of other diseases of the respiratory system 03/24/2020    History of sore throat    Preglaucoma, unspecified, 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    Superficial foreign body, left foot, sequela 01/17/2022    Foreign body in left foot, sequela    Xerosis cutis 06/26/2020    Dry skin dermatitis   [2]   Past Surgical History:  Procedure Laterality Date    ADENOIDECTOMY  07/31/2013    Adenoidectomy    COLONOSCOPY  07/31/2013    Complete Colonoscopy    EYE SURGERY  07/31/2013    Eye Surgery   [3]   Family History  Problem Relation Name Age of Onset    Dementia Mother Ruthie     Kidney failure Mother Ruthie     Hypertension Mother Ruthie     Diabetes Mother Ruthie     Hypertension Father Neptali     Hyperlipidemia Father Neptali     Other (Open heart surgery) Father Neptali     Breast cancer Father Neptali     COPD Father Neptali     Heart disease Father Neptali     Hypertension Sister Tamika     Hyperlipidemia Sister Tamika     Breast cancer Sister Tamika     Breast cancer Father's Brother Jon     Heart disease Maternal Grandfather Don't Remember     Breast cancer Paternal Grandmother Don't  remember     Arthritis Sister Carlota     Mental illness Mother's Sister Jerri    [4]   Social History  Socioeconomic History    Marital status: Single   Tobacco Use    Smoking status: Never     Passive exposure: Never    Smokeless tobacco: Never   Vaping Use    Vaping status: Never Used   Substance and Sexual Activity    Alcohol use: Never    Drug use: Never    Sexual activity: Yes     Partners: Male     Birth control/protection: None     Comment: I went through Menopause        Sharon Ochoa, DO  06/22/25 0656

## 2025-06-23 ENCOUNTER — HOSPITAL ENCOUNTER (EMERGENCY)
Facility: HOSPITAL | Age: 60
Discharge: HOME | End: 2025-06-23
Payer: COMMERCIAL

## 2025-06-23 VITALS
OXYGEN SATURATION: 99 % | SYSTOLIC BLOOD PRESSURE: 124 MMHG | BODY MASS INDEX: 28.55 KG/M2 | HEIGHT: 58 IN | DIASTOLIC BLOOD PRESSURE: 73 MMHG | TEMPERATURE: 98.1 F | RESPIRATION RATE: 17 BRPM | HEART RATE: 70 BPM | WEIGHT: 136 LBS

## 2025-06-23 DIAGNOSIS — Z71.1 PHYSICALLY WELL BUT WORRIED: Primary | ICD-10-CM

## 2025-06-23 DIAGNOSIS — R51.9 ACUTE NONINTRACTABLE HEADACHE, UNSPECIFIED HEADACHE TYPE: ICD-10-CM

## 2025-06-23 PROCEDURE — 2500000001 HC RX 250 WO HCPCS SELF ADMINISTERED DRUGS (ALT 637 FOR MEDICARE OP)

## 2025-06-23 PROCEDURE — 99282 EMERGENCY DEPT VISIT SF MDM: CPT

## 2025-06-23 RX ORDER — ACETAMINOPHEN 325 MG/1
975 TABLET ORAL ONCE
Status: COMPLETED | OUTPATIENT
Start: 2025-06-23 | End: 2025-06-23

## 2025-06-23 RX ADMIN — ACETAMINOPHEN 975 MG: 325 TABLET ORAL at 07:54

## 2025-06-23 NOTE — ED TRIAGE NOTES
Pt comes in for sleeping in a recliner upstairs visiting her boyfriend and now the back of her head hurts

## 2025-06-23 NOTE — ED PROVIDER NOTES
HPI   Chief Complaint   Patient presents with    Headache       Patient is a 60-year-old female who presents today with a chief complaint of headache.  Patient has had several recent visits for this.  Patient is supposed to contact her  before checking in the ER, patient states that she was sleeping upstairs in her recliner because her boyfriend is currently admitted to the hospital, she woke up and she had some mild headache, she states that she took Tylenol couple days ago and was not sure if she could take more, patient has no fever or chills, no blurred vision, no trouble ambulating, no other acute complaints at this time.      History provided by:  Patient          Patient History   Medical History[1]  Surgical History[2]  Family History[3]  Social History[4]    Physical Exam   ED Triage Vitals [06/23/25 0729]   Temperature Heart Rate Respirations BP   36.7 °C (98.1 °F) 70 17 124/73      Pulse Ox Temp Source Heart Rate Source Patient Position   99 % Oral Monitor Sitting      BP Location FiO2 (%)     Left arm --       Physical Exam  Vitals and nursing note reviewed. Exam conducted with a chaperone present.   Constitutional:       General: She is not in acute distress.     Appearance: Normal appearance. She is well-developed and normal weight. She is not ill-appearing, toxic-appearing or diaphoretic.   HENT:      Head: Normocephalic and atraumatic.      Nose: Nose normal.      Mouth/Throat:      Mouth: Mucous membranes are moist.      Pharynx: Oropharynx is clear.   Eyes:      Extraocular Movements: Extraocular movements intact.      Conjunctiva/sclera: Conjunctivae normal.      Pupils: Pupils are equal, round, and reactive to light.   Cardiovascular:      Rate and Rhythm: Normal rate and regular rhythm.      Pulses: Normal pulses.      Heart sounds: Normal heart sounds. No murmur heard.  Pulmonary:      Effort: Pulmonary effort is normal. No respiratory distress.      Breath sounds: Normal breath  sounds.   Abdominal:      General: Abdomen is flat. Bowel sounds are normal.      Palpations: Abdomen is soft.      Tenderness: There is no abdominal tenderness.   Musculoskeletal:         General: No swelling. Normal range of motion.      Cervical back: Normal range of motion and neck supple.   Skin:     General: Skin is warm and dry.      Capillary Refill: Capillary refill takes less than 2 seconds.   Neurological:      General: No focal deficit present.      Mental Status: She is alert and oriented to person, place, and time. Mental status is at baseline.      GCS: GCS eye subscore is 4. GCS verbal subscore is 5. GCS motor subscore is 6.   Psychiatric:         Mood and Affect: Mood normal.         Behavior: Behavior normal.         Thought Content: Thought content normal.           ED Course & MDM   Diagnoses as of 06/23/25 0801   Acute nonintractable headache, unspecified headache type   Physically well but worried                 No data recorded                                 Medical Decision Making  Patient seen and evaluated at bedside, patient is in no acute distress.  I will order a dose of Tylenol. Differential diagnosis includes but is not limited to headache, malingering, worried well  Patient completely reassuring examination,.  Consistent with the patient's normal headache, has been associated recently with her sleeping in her recliner, patient had full workup for this 2 days ago, this is this patient's fourth visit.  Return precautions were discussed with the patient, she is agreeable this discharge plan.  Diagnosis: Headache.        Procedure  Procedures  Sections of this report were created using voice-to-text technology and may contain errors in translation    Preet Burns DO  Emergency Medicine           [1]   Past Medical History:  Diagnosis Date    Blindness of right eye     Body mass index (BMI) 24.0-24.9, adult 10/27/2021    Body mass index (BMI) of 24.0 to 24.9 in adult    CTS (carpal tunnel  syndrome) 2022    Depression 1976?    Fibroid     Kidney stone 2018    Low platelet count     Lumbago with sciatica, right side 01/08/2019    Acute right-sided low back pain with right-sided sciatica    Migraine 1990    Other chest pain 03/13/2020    Chest wall pain    Other specified personal risk factors, not elsewhere classified 10/04/2022    At risk for allergic reaction to medication    Pain in left foot 04/13/2021    Left foot pain    Pain in throat 10/25/2018    Throat pain    Personal history of other diseases of the digestive system 06/20/2022    History of constipation    Personal history of other diseases of the musculoskeletal system and connective tissue 10/20/2021    History of neck pain    Personal history of other diseases of the respiratory system 04/06/2020    History of pharyngitis    Personal history of other diseases of the respiratory system 03/24/2020    History of sore throat    Preglaucoma, unspecified, 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    Superficial foreign body, left foot, sequela 01/17/2022    Foreign body in left foot, sequela    Xerosis cutis 06/26/2020    Dry skin dermatitis   [2]   Past Surgical History:  Procedure Laterality Date    ADENOIDECTOMY  07/31/2013    Adenoidectomy    COLONOSCOPY  07/31/2013    Complete Colonoscopy    EYE SURGERY  07/31/2013    Eye Surgery   [3]   Family History  Problem Relation Name Age of Onset    Dementia Mother Ruthie     Kidney failure Mother Ruthie     Hypertension Mother Ruthie     Diabetes Mother Ruthie     Hypertension Father Neptali     Hyperlipidemia Father Neptali     Other (Open heart surgery) Father Neptali     Breast cancer Father Neptali     COPD Father Neptali     Heart disease Father Neptali     Hypertension Sister Tamika     Hyperlipidemia Sister Tamika     Breast cancer Sister Tamika     Breast cancer Father's Brother Jon     Heart disease Maternal Grandfather Don't Remember     Breast cancer Paternal  Grandmother Don't  remember     Arthritis Sister Carlota     Mental illness Mother's Sister Jerri    [4]   Social History  Tobacco Use    Smoking status: Never     Passive exposure: Never    Smokeless tobacco: Never   Vaping Use    Vaping status: Never Used   Substance Use Topics    Alcohol use: Never    Drug use: Never        Preet Burns DO  06/23/25 0803

## 2025-06-23 NOTE — DISCHARGE INSTRUCTIONS
You are seen today for headache, please follow-up with your primary care provider, please continue to take the Tylenol like we discussed, please return to the ER for any worsening symptoms.

## 2025-06-24 ENCOUNTER — TELEPHONE (OUTPATIENT)
Dept: PRIMARY CARE | Facility: CLINIC | Age: 60
End: 2025-06-24
Payer: COMMERCIAL

## 2025-06-24 NOTE — TELEPHONE ENCOUNTER
Mady called office today with complaint of middle back pain and cough. Mady states that she was told by ER doctor that she has pneumonia. I do not see a chest x-ray report for pneumonia.  Mady would like to know what she can do for the middle back pain.

## 2025-07-01 DIAGNOSIS — D69.6 THROMBOCYTOPENIA: ICD-10-CM

## 2025-07-07 ENCOUNTER — APPOINTMENT (OUTPATIENT)
Dept: RADIOLOGY | Facility: HOSPITAL | Age: 60
End: 2025-07-07
Payer: COMMERCIAL

## 2025-07-09 ENCOUNTER — APPOINTMENT (OUTPATIENT)
Dept: PRIMARY CARE | Facility: CLINIC | Age: 60
End: 2025-07-09
Payer: COMMERCIAL

## 2025-07-13 NOTE — PROGRESS NOTES
ANNUAL GYNECOLOGIC VISIT     Subjective   HPI:  Mady Chapa is a 60 y.o. female  No LMP recorded. Patient is perimenopausal. here for annual exam.    Complaints:   ***  Periods: menopausal since  ***  Abnormal bleeding:   ***  Pain:    ***    GYNH:   HRT: no  History of abnormal Pap smear:   ***  History of abnormal mammogram:   ***  Colon Screening:  ***    Last Pap Smear:  Result Date Procedure Results Follow-ups   10/24/2023 THINPREP PAP TEST Perform HPV HR test?: Reflex if ASCUS only  Include HPV Genotype?: Yes  Full result values not displayed (4): See result report for details        Last Mammogram:  Results for orders placed during the hospital encounter of 24    BI mammo bilateral diagnostic tomosynthesis    Narrative  Interpreted By:  Mike Magdaleno,  STUDY:  BI MAMMO BILATERAL DIAGNOSTIC TOMOSYNTHESIS; BI US BREAST LIMITED  RIGHT;  2024 8:30 am; 2024 8:56 am    ACCESSION NUMBER(S):  [id number]; [id number]    ORDERING CLINICIAN:  KAMERON EDWARDS    INDICATION:  Diagnostic mammogram, palpable lump in the axillary tail of the right  breast.    COMPARISON:  2022, 2021, 2020 and 2020    FINDINGS:  2D and tomosynthesis images were reviewed at 1 mm slice thickness.  Please note that this examination was also evaluated with the use of  computer-aided detection (CAD).    Density:  The breast tissue is heterogeneously dense, which may  obscure small masses.    No suspicious masses or calcifications are identified. Specifically  no abnormalities are visualized in the axillary tail of the right  breast, in the region of the palpable lump that was marked with a  skin marker.    Real-time targeted ultrasound of the right breast only was also  performed for further evaluation. No cystic or solid lesion is  identified in the area of the palpable lump.    Impression  No mammographic evidence of malignancy.    The patient's lifetime risk for developing breast  cancer is 23.37 %.  The American Cancer Society recommends screening breast MRI in  certain high-risk women, and the ACR and Society of Breast Imaging  endorse those recommendations. Screening MRI is recommended in women  with BRCA gene mutations and their untested first-degree relatives as  well as women with a lifetime risk of breast cancer of ~20% or  greater.    Therefore, screening breast MRI is recommended for further evaluation.      Based on the Tyrer-Cuzick model for breast cancer risk assessment,  the patient's lifetime risk of breast cancer is 23.37%. Patients with  over a 20% lifetime risk of developing breast cancer may benefit from  additional screening with breast MRI or ultrasound. Please note that  this estimate is based on responses provided on the patient  questionnaire. For more information regarding high risk consultation,  please call [phone].    BI-RADS CATEGORY:    BI-RADS Category:  1 Negative.  Recommendation:  Annual Screening.  Recommended Date:  1 Year.  Laterality:  Bilateral.    For any future breast imaging appointments, please call [phone] ([phone]).    MACRO:  None      Signed by: Mike Magdaleno 2024 9:11 AM  Dictation workstation:   [id number]      Last DEXA:  === 18 ===    BONE DENSITY    OB History          0    Para   0    Term   0       0    AB   0    Living   0         SAB   0    IAB   0    Ectopic   0    Multiple   0    Live Births   0                  Medical History[1]    Surgical History[2]    Prior to Admission medications    Medication Sig Start Date End Date Taking? Authorizing Provider   acetaminophen (Tylenol) 500 mg tablet Take 1 tablet (500 mg) by mouth. TAKE 1 TABLET EVERY 4 TO 6 HOURS AS NEEDED. 6/17/15   Historical Provider, MD   albuterol 90 mcg/actuation inhaler Inhale 2 puffs every 4 hours if needed for wheezing. 25  Vincenzo Vazquez PA-C   atorvastatin (Lipitor) 20 mg tablet TAKE 1 TABLET BY MOUTH ONCE  DAILY  4/1/25   Sil Pike MD   benzonatate (Tessalon) 200 mg capsule Take 1 capsule (200 mg) by mouth 3 times a day as needed for cough. Do not crush or chew. 6/20/25 7/20/25  Sil Pike MD   brimonidine-timoloL (Combigan) 0.2-0.5 % ophthalmic solution  3/19/23   Historical Provider, MD   butalbital-acetaminophen-caff -40 mg tablet  12/16/22   Historical Provider, MD   cholecalciferol (Vitamin D3) 25 MCG (1000 UT) capsule Take 1 capsule (25 mcg) by mouth once daily. 3/31/25 3/31/26  Sil Pike MD   cyanocobalamin (Vitamin B-12) 1,000 mcg tablet Take 1 tablet (1,000 mcg) by mouth once daily. 5/28/25 5/28/26  Sil Pike MD   dicyclomine (Bentyl) 20 mg tablet Take 1 tablet (20 mg) by mouth every 6 hours if needed. 11/4/19   Historical Provider, MD   eletriptan (Relpax) 40 mg tablet  12/21/22   Historical Provider, MD   famotidine (Pepcid) 40 mg tablet TAKE 1 TABLET BY MOUTH TWICE  DAILY FOR 1 MONTH AND THEN TAKE  1 TABLET BY MOUTH ONCE DAILY 5/28/25   Sil Pike MD   gabapentin (Neurontin) 300 mg capsule Take 1 capsule (300 mg) by mouth 3 times a day. 3/31/25   Sil Pike MD   latanoprost (Xalatan) 0.005 % ophthalmic solution Administer into affected eye(s). 5/23/16   Historical Provider, MD   lidocaine (XYLOCAINE) 4 % gel gel Apply 4 times a day 8/30/24   Sil Pike MD   loratadine (Claritin) 10 mg tablet Take 1 tablet (10 mg) by mouth once daily. 9/29/15   Historical Provider, MD   Lumigan 0.01 % ophthalmic solution  12/16/22   Historical Provider, MD   meclizine (Antivert) 25 mg tablet Take 1 tablet (25 mg) by mouth 2 times a day as needed. 7/15/19   Historical Provider, MD   polyethylene glycol (Glycolax, Miralax) 17 gram packet Take by mouth once daily.    Historical Provider, MD   propranolol XL (Innopran XL) 120 mg 24 hr capsule Take 1 capsule (120 mg) by mouth early in the morning.. 1/13/17   Historical Provider, MD   psyllium (Metamucil, sugar,) powder Take by mouth.    Historical  Provider, MD Hernandez 0.02-0.005 % drops  3/7/23   Historical Provider, MD   sodium chloride (Ayr) 0.65 % nasal drops Administer 1-2 drops into affected nostril(s). 1-2 sprays in each nostril 2-3 times/day as needed 11/6/14   Historical Provider, MD       Social History[3]     Objective   There were no vitals taken for this visit.       General:   Alert and oriented, in no acute distress   Neck: Supple. No visible thyromegaly.    Breast/Axilla: Normal to palpation bilaterally without masses, skin changes, or nipple discharge.    Abdomen: Soft, non-tender, without masses or organomegaly   Vulva: Normal architecture without erythema, masses, or lesions.    Vagina: Normal mucosa without lesions, masses.  Positive atrophy. No abnormal vaginal discharge.    Cervix: Normal without masses, lesions, or signs of cervicitis.    Uterus: Normal mobile, non-enlarged uterus    Adnexa: No palpable masses or tenderness   Pelvic Floor No POP noted. No high tone pelvic floor    Psych  Rectal Normal affect. Normal mood.   deferred     Assessment/Plan   {Assess/Plan SmartLinks (Optional):25343}  Routine annual  OB/GYN Preventive:     - Pap smear indicated every 3-5 years if normal and otherwise low risk   - Self breast exam monthly and clinical breast examination/mammogram yearly   - Screening colonoscopy recommended starting age 45, then Q3-10 years depending on testing and family history   - Osteoporosis prevention discussion included vit D3/calcium supplements, weight-bearing exercise, DEXA starting at age 65 or sooner if risk factors.  - Genitourinary skin hygiene discussed.  - Diet/Weight management discussed.  - Exercise 30-60 minutes 3-5 times/day  -Patient to return in 1 year for annual GYN visit or sooner as clinically indicated.    Bridgett Huertas MD                 [1]   Past Medical History:  Diagnosis Date    Blindness of right eye     Body mass index (BMI) 24.0-24.9, adult 10/27/2021    Body mass index (BMI) of 24.0 to  24.9 in adult    CTS (carpal tunnel syndrome) 2022    Depression 1976?    Fibroid     Kidney stone 2018    Low platelet count     Lumbago with sciatica, right side 01/08/2019    Acute right-sided low back pain with right-sided sciatica    Migraine 1990    Other chest pain 03/13/2020    Chest wall pain    Other specified personal risk factors, not elsewhere classified 10/04/2022    At risk for allergic reaction to medication    Pain in left foot 04/13/2021    Left foot pain    Pain in throat 10/25/2018    Throat pain    Personal history of other diseases of the digestive system 06/20/2022    History of constipation    Personal history of other diseases of the musculoskeletal system and connective tissue 10/20/2021    History of neck pain    Personal history of other diseases of the respiratory system 04/06/2020    History of pharyngitis    Personal history of other diseases of the respiratory system 03/24/2020    History of sore throat    Preglaucoma, unspecified, 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    Superficial foreign body, left foot, sequela 01/17/2022    Foreign body in left foot, sequela    Xerosis cutis 06/26/2020    Dry skin dermatitis   [2]   Past Surgical History:  Procedure Laterality Date    ADENOIDECTOMY  07/31/2013    Adenoidectomy    COLONOSCOPY  07/31/2013    Complete Colonoscopy    EYE SURGERY  07/31/2013    Eye Surgery   [3]   Social History  Tobacco Use    Smoking status: Never     Passive exposure: Never    Smokeless tobacco: Never   Vaping Use    Vaping status: Never Used   Substance Use Topics    Alcohol use: Never    Drug use: Never

## 2025-07-14 ENCOUNTER — TELEPHONE (OUTPATIENT)
Dept: PRIMARY CARE | Facility: CLINIC | Age: 60
End: 2025-07-14
Payer: COMMERCIAL

## 2025-07-14 NOTE — TELEPHONE ENCOUNTER
Mady called office today with request to see if Dr. Pike would consider taking her back as a patient as she knows her mistake now and what's consider as an emergency.  Mady would like to get a second chance as Dr. Pike is a wonderful doctor and hopefully she reconsiders.  Mady wants to know if Dr. Pike would consider a meeting with her and  along with supervisor of practice.    Phone: [phone]

## 2025-07-16 ENCOUNTER — APPOINTMENT (OUTPATIENT)
Dept: OTOLARYNGOLOGY | Facility: CLINIC | Age: 60
End: 2025-07-16
Payer: COMMERCIAL

## 2025-07-16 ENCOUNTER — TELEMEDICINE (OUTPATIENT)
Dept: CARDIOLOGY | Facility: CLINIC | Age: 60
End: 2025-07-16
Payer: COMMERCIAL

## 2025-07-16 DIAGNOSIS — R07.9 CHEST PAIN, UNSPECIFIED TYPE: Primary | ICD-10-CM

## 2025-07-16 DIAGNOSIS — M79.7 FIBROMYALGIA: ICD-10-CM

## 2025-07-16 DIAGNOSIS — R06.83 SNORING: ICD-10-CM

## 2025-07-16 DIAGNOSIS — R07.89 NON-CARDIAC CHEST PAIN: ICD-10-CM

## 2025-07-16 DIAGNOSIS — R73.03 PREDIABETES: ICD-10-CM

## 2025-07-16 DIAGNOSIS — E78.00 HIGH BLOOD CHOLESTEROL: ICD-10-CM

## 2025-07-16 PROCEDURE — G2211 COMPLEX E/M VISIT ADD ON: HCPCS

## 2025-07-16 PROCEDURE — 99214 OFFICE O/P EST MOD 30 MIN: CPT

## 2025-07-16 PROCEDURE — 1036F TOBACCO NON-USER: CPT

## 2025-07-16 RX ORDER — ATORVASTATIN CALCIUM 20 MG/1
20 TABLET, FILM COATED ORAL DAILY
Qty: 90 TABLET | Refills: 3 | Status: SHIPPED | OUTPATIENT
Start: 2025-07-16 | End: 2026-07-16

## 2025-07-16 NOTE — PROGRESS NOTES
Location of visit: [address]   Type of Visit: Established - Last Seen: 01-22-25    Chief Complaint:  Patient was referred to Cardiology by Dr. Pike for chest pain    History Of Present Illness:    Mady Chapa is a 60 y.o. female, with history significant for elevated cholesterol, prediabetes, fibromyalgia, fatty liver, GERD and snoring who visits Cardiology today as a follow up visit  for chest.    Virtual or Telephone Consent  A telephone visit (audio only) between the patient (at the originating site) and the provider (at the distant site) was utilized to provide this telehealth service.  Verbal consent was requested and obtained from Mady Chapa on this date, 07/16/25 for a telehealth visit.     59-year-old female presenting for cardiology evaluation with a history of elevated cholesterol, prediabetes, and chest pain characterized as chest wall tenderness.  She also reports snoring with a pending sleep study and migraines that respond to eletriptan.   Last lipid panel showed > Chol 132 - LDL 67 - trig 87  Using atorvastatin 20mg and propranolol and gabapentin  Walks about 30 min a day  Tobacco -  [place] -  Family history of her sister had a [place] attack at 70s  She states that she has normal functional capacity, and she has been having intermittent episodes of chest pain, most of the time the pain can be elicited by pressing her chest.  She denies dyspnea on exertion, shortness of breath, orthopnea, PND, nocturia, edema, palpitations, dizziness, lightheadedness, syncope, claudication.    ============================   Stress test>  No clinical or electrocardiographic evidence for ischemia at submaximal workload.. Submaximal level of stress achieved.   She reports that her chest pain has improved. She feels less short of breath and is performing activities without any issues. She has not experienced presyncope or syncope and is taking her medications without any problems. Additionally, she is very distressed  about the health of Minesh, her partner, and is concerned about the use of anticoagulants. I answered all her questions.    =============================    Virtual or Telephone Consent    An interactive audio and video telecommunication system which permits real time communications between the patient (at the originating site) and provider (at the distant site) was utilized to provide this telehealth service.   Verbal consent was requested and obtained from Mady CUI Sammi on this date, 07/16/25 for a telehealth visit and the patient's location was confirmed at the time of the visit.    She states in the overall she has been feeling fine.  Still some episodes of chest pains but improved she also feels less short of breath.  She is able to do all her activities with no issues.  She denies palpitations, lightheadedness, syncope, leg edema.  She has been compliant with medications with no issues.  She did not follow-up with the referral for sleep medicine.      Past Medical History:  She has a past medical history of Blindness of right eye, Body mass index (BMI) 24.0-24.9, adult (10/27/2021), CTS (carpal tunnel syndrome) (2022), Depression (1976?), Fibroid, Kidney stone (2018), Low platelet count, Lumbago with sciatica, right side (01/08/2019), Migraine (1990), Other chest pain (03/13/2020), Other specified personal risk factors, not elsewhere classified (10/04/2022), Pain in left foot (04/13/2021), Pain in throat (10/25/2018), Personal history of other diseases of the digestive system (06/20/2022), Personal history of other diseases of the musculoskeletal system and connective tissue (10/20/2021), Personal history of other diseases of the respiratory system (04/06/2020), Personal history of other diseases of the respiratory system (03/24/2020), Preglaucoma, unspecified, unspecified eye (07/31/2013), Rheumatoid arthritis (2019?), Superficial foreign body, left foot, sequela (01/17/2022), and Xerosis cutis  (06/26/2020).    Past Surgical History:  She has a past surgical history that includes Adenoidectomy (07/31/2013); Eye surgery (07/31/2013); and Colonoscopy (07/31/2013).    Social History:  She reports that she has never smoked. She has never been exposed to tobacco smoke. She has never used smokeless tobacco. She reports that she does not drink alcohol and does not use drugs.    Family History:  Family History   Problem Relation Name Age of Onset    Dementia Mother Ruthie     Kidney failure Mother Ruthie     Hypertension Mother Ruthie     Diabetes Mother Ruthie     Hypertension Father Neptali     Hyperlipidemia Father Neptali     Other (Open heart surgery) Father Neptali     Breast cancer Father Neptali     COPD Father Neptali     Heart disease Father Neptali     Hypertension Sister Tamika     Hyperlipidemia Sister Tamika     Breast cancer Sister Tamika     Breast cancer Father's Brother Jon     Heart disease Maternal Grandfather Don't Remember     Breast cancer Paternal Grandmother Don't  remember     Arthritis Sister Carlota     Mental illness Mother's Sister Jerri      Allergies:  Amitriptyline, Biotin, Clindamycin, Doxycycline, Latex, Metronidazole, Nortriptyline, Omeprazole, Sulfamethoxazole-trimethoprim, Vitamin b12-folic acid, Cephalexin, Ciprofloxacin, Magnesium citrate, Nitrofurantoin monohyd/m-cryst, Penicillins, and Sulfa (sulfonamide antibiotics)    Outpatient Medications:  Current Outpatient Medications   Medication Instructions    acetaminophen (TYLENOL) 500 mg    albuterol 90 mcg/actuation inhaler 2 puffs, inhalation, Every 4 hours PRN    atorvastatin (LIPITOR) 20 mg, oral, Daily    benzonatate (TESSALON) 200 mg, oral, 3 times daily PRN, Do not crush or chew.    brimonidine-timoloL (Combigan) 0.2-0.5 % ophthalmic solution     butalbital-acetaminophen-caff -40 mg tablet     cholecalciferol (VITAMIN D3) 25 mcg, oral, Daily    cyanocobalamin (VITAMIN B-12) 1,000 mcg, oral, Daily    dicyclomine (BENTYL) 20 mg,  "Every 6 hours PRN    eletriptan (Relpax) 40 mg tablet     famotidine (Pepcid) 40 mg tablet TAKE 1 TABLET BY MOUTH TWICE  DAILY FOR 1 MONTH AND THEN TAKE  1 TABLET BY MOUTH ONCE DAILY    gabapentin (NEURONTIN) 300 mg, oral, 3 times daily    latanoprost (Xalatan) 0.005 % ophthalmic solution Administer into affected eye(s).    lidocaine (XYLOCAINE) 4 % gel gel Apply 4 times a day    loratadine (CLARITIN) 10 mg, Daily RT    Lumigan 0.01 % ophthalmic solution     meclizine (ANTIVERT) 25 mg, 2 times daily PRN    polyethylene glycol (Glycolax, Miralax) 17 gram packet Daily    propranolol XL (INNOPRAN XL) 120 mg, Daily    psyllium (Metamucil, sugar,) powder Take by mouth.    Rocklatan 0.02-0.005 % drops     sodium chloride (Ayr) 0.65 % nasal drops 1-2 drops     Last Recorded Vitals:  There were no vitals filed for this visit.    Physical Exam:      6/23/2025     7:29 AM 6/22/2025     5:47 PM 6/22/2025     6:06 AM 6/21/2025     1:44 PM 6/21/2025     1:43 PM 6/11/2025     9:42 AM   Vitals   Systolic 124 141 132  141    Diastolic 73 61 77  86    BP Location Left arm  Left arm      Heart Rate 70 83 62  82    Temp 36.7 °C (98.1 °F) 36.1 °C (97 °F) 35.6 °C (96.1 °F) 37 °C (98.6 °F)     Resp 17 14 18  18    Height 1.473 m (4' 10\") 1.473 m (4' 10\") 1.473 m (4' 10\")  1.473 m (4' 10\") 1.473 m (4' 10\")   Weight (lb) 136 136 136  136 135   BMI 28.42 kg/m2 28.42 kg/m2 28.42 kg/m2  28.42 kg/m2 28.22 kg/m2   BSA (m2) 1.59 m2 1.59 m2 1.59 m2  1.59 m2 1.58 m2   Visit Report      Report     Wt Readings from Last 5 Encounters:   06/23/25 61.7 kg (136 lb)   06/22/25 61.7 kg (136 lb)   06/22/25 61.7 kg (136 lb)   06/21/25 61.7 kg (136 lb)   06/11/25 61.2 kg (135 lb)       Physical Exam   Not performed because it was a phone visit      Last Labs Reviewed:  CBC -  Recent Labs     03/05/25  0907 07/17/24  0848 06/21/23  0851 11/27/22  1320 09/15/22  1555   WBC 6.9 5.8 6.5 7.2 7.5   HGB 12.4 12.5 12.6 13.2 13.8   HCT 35.9* 36.0 36.4 38.3 39.6   PLT " "80* 84* 84* 86* 84*   MCV 92 90 91 92.5 93.0     CMP -  Recent Labs     12/09/24  1556 11/27/22  1320 09/15/22  1816 09/15/22  1555 06/09/22  0918 04/01/22  1428    141  --  140 141 143   K 3.8 4.7 3.9 SAMPLE HEMOLYZED TO BE RECOLLECTED 4.4 4.1    105  --  102 103 108*   CO2 27 27  --  26 28 25   ANIONGAP 15 10  --  12 10 10   BUN 11 10  --  12 9 13   CREATININE 0.81 0.7  --  0.7 0.8 0.7   EGFR 84 101  --  101 86 102   CALCIUM 9.2 9.7  --  10.0 10.0 9.5     Recent Labs     11/27/22  1320 09/27/22  0902 09/15/22  1816 09/15/22  1555 07/21/22  1005 06/09/22  0918 01/21/22  1512 03/13/20  1058 02/10/20  1207   ALBUMIN 4.3  --   --  4.6 4.8 4.9 4.4   < > 4.3   ALKPHOS 61  --  54 SAMPLE HEMOLYZED TO BE RECOLLECTED 58 63 56   < > 43   ALT 45*  --  54* SAMPLE HEMOLYZED TO BE RECOLLECTED 44* 46* 45*   < > 48*   AST 24  --  32 SAMPLE HEMOLYZED TO BE RECOLLECTED 22 33 23   < > 20   BILITOT 0.5  --   --  0.8 0.7 0.5 0.8   < > 0.7   LIPASE 42 73*  --  95*  --  66*  --   --  72    < > = values in this interval not displayed.     LIPID PANEL -   Recent Labs     12/09/24  1556 09/27/22  0902 07/21/22  1005 01/21/22  1512 03/13/20  1058 02/04/19  1345 09/24/18  1139   CHOL 132 117* 119* 99* 108 121 99   LDLF  --   --   --   --  56 61 50   LDLCALC  --  57* 52* 40*  --   --   --    HDL 50.0 50* 56 47* 39.3* 42.5 35.3*   TRIG 87 49 53 61 65 87 71     COAGULATION PANEL -  Recent Labs     02/10/20  1207   INR 1.1     HEME/ENDO -  Recent Labs     07/17/24  0848 06/27/24  1202 09/27/22  0902 06/14/22  1015 01/21/22  1512 09/04/20  1155   FERRITIN 54  --   --   --   --   --    IRONSAT 33  --   --   --   --   --    TSH  --   --   --  2.02 0.85  --    HGBA1C  --  4.4 4.6  --  4.5 4.8   FREET4  --   --   --  0.99  --   --      CARDIOVASCULAR  No results for input(s): \"LDH\", \"CKMB\", \"TROPHS\", \"BNP\", \"CRP\" in the last 58581 hours.    No lab exists for component: \"CK\", \"CKMBP\", \"CRPUS\", \"USCRP\"  Last Cardiology/Imaging Tests " Personally Reviewed (if images available) and Interpreted:  ECG:  No results found. However, due to the size of the patient record, not all encounters were searched. Please check Results Review for a complete set of results.  ECG 12 lead (Clinic Performed) 11/20/2024    Echocardiogram:  No results found for this or any previous visit from the past 1825 days.  No results found for this or any previous visit from the past 1095 days.    Cath:  No results found for this or any previous visit from the past 1825 days.  No results found for this or any previous visit from the past 3650 days.  No results found for this or any previous visit from the past 1095 days.    Stress Test:  No results found for this or any previous visit from the past 1825 days.  No results found for this or any previous visit from the past 1095 days.    Cardiac CT/MRI:  No results found for this or any previous visit from the past 1825 days.  No results found for this or any previous visit from the past 1095 days.    Other CT:      CV RISK FACTORS:   # Hypertension: Last BP:  .  # Hyperlipidemia: Last Tchol 132 / LDL No results found for requested labs within last 365 days. / HDL 50.0 / TRIG 87 (12/9/2024:  3:56 PM).  # Type II Diabetes Mellitus: Last A1c No results found for requested labs within last 365 days. (No results in last year.).  # Obesity: Last BMI:  .  # CKD: Last BUN/Cr (GFR): 11/0.81 (84), 12/9/2024:  3:56 PM.    ASCV RISK:  The 10-year ASCVD risk score (Roro BURNS, et al., 2019) is: 4.1%    Values used to calculate the score:      Age: 60 years      Clincally relevant sex: Female      Is Non- : No      Diabetic: Yes      Tobacco smoker: No      Systolic Blood Pressure: 124 mmHg      Is BP treated: No      HDL Cholesterol: 50 mg/dL      Total Cholesterol: 132 mg/dL    Assessment/Plan   Mady Chapa is a 59 y.o. female, presenting for cardiology evaluation. She has a history of elevated cholesterol, prediabetes,  and chest pain previously described as chest wall tenderness. She reports occasional migraines responsive to eletriptan and is currently using atorvastatin 20 mg and propranolol. Her last lipid panel indicated total cholesterol of 132 mg/dL, LDL 67 mg/dL, and triglycerides 87 mg/dL. A sleep study is pending due to reported snoring.    At the follow up visit> She reports that her chest pain has improved. She feels less short of breath and is performing activities without any issues. She has not experienced presyncope or syncope and is taking her medications without any problems. Additionally, she is very distressed about the health of Minesh, her partner, and is concerned about the use of anticoagulants. I answered all her questions.    #Chest pain - better  Stress test> No clinical or electrocardiographic evidence for ischemia at submaximal workload. Submaximal level of stress achieved.  #Stable Dyslipidemia - Well-managed on current atorvastatin dose.  #Prediabetes - Requires continued monitoring and lifestyle interventions.  #Possible Obstructive Sleep Apnea (ZAIN) - Snoring and pending sleep study suggest ZAIN evaluation is warranted.    Assessment and Plan:  Clinically doing better with current medications.  The stress test was negative for ischemia.  She continues on atorvastatin 20 mg for lipid management.  I recommend to recheck her lipids prior to her next visit.  I emphasized also she has to try to continue with her lifestyle changes including healthy diet and regular exercise.  He recommend to follow-up with the sleep medicine referral. Follow up in 6 months    Recommendations:  -I encourage adherence to medications and recommended lifestyle modifications.  -Follow up in 6 months with a new lipid panel  -Referral for sleep medicine  - I spent 46 minutes assessing the case between pre-charting, phone patient interaction, and documentation    Cal Long MD

## 2025-07-17 ENCOUNTER — APPOINTMENT (OUTPATIENT)
Dept: OBSTETRICS AND GYNECOLOGY | Facility: CLINIC | Age: 60
End: 2025-07-17
Payer: COMMERCIAL

## 2025-07-18 ENCOUNTER — APPOINTMENT (OUTPATIENT)
Dept: HEMATOLOGY/ONCOLOGY | Facility: CLINIC | Age: 60
End: 2025-07-18
Payer: COMMERCIAL

## 2025-07-18 DIAGNOSIS — D69.6 THROMBOCYTOPENIA: Primary | ICD-10-CM

## 2025-07-19 ENCOUNTER — HOSPITAL ENCOUNTER (EMERGENCY)
Facility: HOSPITAL | Age: 60
Discharge: HOME | End: 2025-07-19
Payer: COMMERCIAL

## 2025-07-19 ENCOUNTER — APPOINTMENT (OUTPATIENT)
Dept: RADIOLOGY | Facility: HOSPITAL | Age: 60
End: 2025-07-19
Payer: COMMERCIAL

## 2025-07-19 VITALS
RESPIRATION RATE: 16 BRPM | OXYGEN SATURATION: 99 % | HEART RATE: 57 BPM | BODY MASS INDEX: 27.82 KG/M2 | HEIGHT: 59 IN | WEIGHT: 138 LBS | TEMPERATURE: 98.2 F | DIASTOLIC BLOOD PRESSURE: 99 MMHG | SYSTOLIC BLOOD PRESSURE: 177 MMHG

## 2025-07-19 DIAGNOSIS — R09.A2 SENSATION OF FOREIGN BODY IN ESOPHAGUS: Primary | ICD-10-CM

## 2025-07-19 PROCEDURE — 71046 X-RAY EXAM CHEST 2 VIEWS: CPT | Mod: FOREIGN READ | Performed by: RADIOLOGY

## 2025-07-19 PROCEDURE — 70360 X-RAY EXAM OF NECK: CPT

## 2025-07-19 PROCEDURE — 99284 EMERGENCY DEPT VISIT MOD MDM: CPT | Mod: 25

## 2025-07-19 PROCEDURE — 71046 X-RAY EXAM CHEST 2 VIEWS: CPT

## 2025-07-19 PROCEDURE — 70360 X-RAY EXAM OF NECK: CPT | Mod: FOREIGN READ | Performed by: RADIOLOGY

## 2025-07-19 NOTE — ED TRIAGE NOTES
Pt states she dropped a ceramic mug on the floor, then picked up the mug and was afraid that maybe a piece went into her coffee that she was drinking.

## 2025-07-19 NOTE — ED PROVIDER NOTES
HPI   Chief Complaint   Patient presents with    Sore Throat       60-year-old female presented emergency department with a chief complaint of foreign body sensation in her throat.  She says she broke a coffee mug this morning.  She drinks on the coffee she is concerned she may have swallowed some ceramic glass.  She denies any pain while actually drinking the coffee.  She is well-appearing nontoxic, tolerating by mouth, no airway, or major respiratory distress.              Patient History   Medical History[1]  Surgical History[2]  Family History[3]  Social History[4]    Physical Exam   ED Triage Vitals [07/19/25 1405]   Temperature Heart Rate Respirations BP   36.8 °C (98.2 °F) 57 16 (!) 177/99      Pulse Ox Temp Source Heart Rate Source Patient Position   99 % Temporal Monitor --      BP Location FiO2 (%)     -- --       Physical Exam  Vitals and nursing note reviewed.   Constitutional:       Appearance: She is well-developed.   HENT:      Head: Normocephalic.      Mouth/Throat:      Mouth: Mucous membranes are moist.      Pharynx: Oropharynx is clear.      Tonsils: No tonsillar exudate or tonsillar abscesses.     Eyes:      Conjunctiva/sclera: Conjunctivae normal.       Cardiovascular:      Rate and Rhythm: Normal rate and regular rhythm.      Heart sounds: Normal heart sounds.   Pulmonary:      Effort: Pulmonary effort is normal.   Abdominal:      Palpations: Abdomen is soft.     Musculoskeletal:      Cervical back: Normal range of motion.     Skin:     General: Skin is warm and dry.     Neurological:      General: No focal deficit present.      Mental Status: She is alert and oriented to person, place, and time.     Psychiatric:         Mood and Affect: Mood normal.           ED Course & MDM   Diagnoses as of 07/19/25 1512   Sensation of foreign body in esophagus                 No data recorded     Mateo Coma Scale Score: 15 (07/19/25 1409 : Jayda Coyle RN)                           Medical Decision  Making  I have seen and evaluated this patient.  Physician available for consultation.  Vital signs have been reviewed.  All laboratory and diagnostic imaging is reviewed by myself and interpreted by myself unless otherwise stated.  Additionally imaging is interpreted by radiologist.    X-ray negative for acute foreign body.  Patient tolerating by mouth.  Released in stable condition from emergency standpoint with outpatient follow-up.    Labs Reviewed - No data to display  XR chest 2 views   ED Interpretation    No foreign body     XR neck soft tissue   ED Interpretation    No foreign body     Medications - No data to display  Discharge Medication List as of 7/19/2025  2:55 PM                Procedure  Procedures       [1]   Past Medical History:  Diagnosis Date    Blindness of right eye     Body mass index (BMI) 24.0-24.9, adult 10/27/2021    Body mass index (BMI) of 24.0 to 24.9 in adult    CTS (carpal tunnel syndrome) 2022    Depression 1976?    Fibroid     Kidney stone 2018    Low platelet count     Lumbago with sciatica, right side 01/08/2019    Acute right-sided low back pain with right-sided sciatica    Migraine 1990    Other chest pain 03/13/2020    Chest wall pain    Other specified personal risk factors, not elsewhere classified 10/04/2022    At risk for allergic reaction to medication    Pain in left foot 04/13/2021    Left foot pain    Pain in throat 10/25/2018    Throat pain    Personal history of other diseases of the digestive system 06/20/2022    History of constipation    Personal history of other diseases of the musculoskeletal system and connective tissue 10/20/2021    History of neck pain    Personal history of other diseases of the respiratory system 04/06/2020    History of pharyngitis    Personal history of other diseases of the respiratory system 03/24/2020    History of sore throat    Preglaucoma, unspecified, 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     Superficial foreign body, left foot, sequela 01/17/2022    Foreign body in left foot, sequela    Xerosis cutis 06/26/2020    Dry skin dermatitis   [2]   Past Surgical History:  Procedure Laterality Date    ADENOIDECTOMY  07/31/2013    Adenoidectomy    COLONOSCOPY  07/31/2013    Complete Colonoscopy    EYE SURGERY  07/31/2013    Eye Surgery   [3]   Family History  Problem Relation Name Age of Onset    Dementia Mother Ruthie     Kidney failure Mother Ruthie     Hypertension Mother Ruthie     Diabetes Mother Ruthie     Hypertension Father Neptali     Hyperlipidemia Father Neptali     Other (Open heart surgery) Father Neptali     Breast cancer Father Neptali     COPD Father Neptali     Heart disease Father Neptali     Hypertension Sister Tamika     Hyperlipidemia Sister Tamika     Breast cancer Sister Tamika     Breast cancer Father's Brother Jon     Heart disease Maternal Grandfather Don't Remember     Breast cancer Paternal Grandmother Don't  remember     Arthritis Sister Carlota     Mental illness Mother's Sister Jerri    [4]   Social History  Tobacco Use    Smoking status: Never     Passive exposure: Never    Smokeless tobacco: Never   Vaping Use    Vaping status: Never Used   Substance Use Topics    Alcohol use: Never    Drug use: Never        Vincenzo Vazquez PA-C  07/19/25 1510

## 2025-07-21 ENCOUNTER — APPOINTMENT (OUTPATIENT)
Dept: PRIMARY CARE | Facility: CLINIC | Age: 60
End: 2025-07-21
Payer: COMMERCIAL

## 2025-07-31 ENCOUNTER — APPOINTMENT (OUTPATIENT)
Dept: AUDIOLOGY | Facility: CLINIC | Age: 60
End: 2025-07-31
Payer: COMMERCIAL

## 2025-08-11 ENCOUNTER — APPOINTMENT (OUTPATIENT)
Dept: CARDIOLOGY | Facility: HOSPITAL | Age: 60
End: 2025-08-11
Payer: COMMERCIAL

## 2025-08-11 ENCOUNTER — HOSPITAL ENCOUNTER (EMERGENCY)
Facility: HOSPITAL | Age: 60
Discharge: HOME | End: 2025-08-11
Attending: EMERGENCY MEDICINE
Payer: COMMERCIAL

## 2025-08-11 ENCOUNTER — APPOINTMENT (OUTPATIENT)
Dept: RADIOLOGY | Facility: HOSPITAL | Age: 60
End: 2025-08-11
Payer: COMMERCIAL

## 2025-08-11 VITALS
BODY MASS INDEX: 27.82 KG/M2 | HEIGHT: 59 IN | SYSTOLIC BLOOD PRESSURE: 135 MMHG | WEIGHT: 138 LBS | RESPIRATION RATE: 19 BRPM | OXYGEN SATURATION: 99 % | TEMPERATURE: 98.1 F | HEART RATE: 56 BPM | DIASTOLIC BLOOD PRESSURE: 66 MMHG

## 2025-08-11 DIAGNOSIS — R07.89 CHEST WALL PAIN: Primary | ICD-10-CM

## 2025-08-11 LAB
ALBUMIN SERPL BCP-MCNC: 4.3 G/DL (ref 3.4–5)
ALP SERPL-CCNC: 45 U/L (ref 33–110)
ALT SERPL W P-5'-P-CCNC: 20 U/L (ref 7–45)
ANION GAP SERPL CALCULATED.3IONS-SCNC: 10 MMOL/L (ref 10–20)
AST SERPL W P-5'-P-CCNC: 14 U/L (ref 9–39)
BASOPHILS # BLD MANUAL: 0 X10*3/UL (ref 0–0.1)
BASOPHILS NFR BLD MANUAL: 0 %
BILIRUB SERPL-MCNC: 0.9 MG/DL (ref 0–1.2)
BUN SERPL-MCNC: 10 MG/DL (ref 6–23)
CALCIUM SERPL-MCNC: 9.6 MG/DL (ref 8.6–10.3)
CARDIAC TROPONIN I PNL SERPL HS: 3 NG/L (ref 0–13)
CHLORIDE SERPL-SCNC: 107 MMOL/L (ref 98–107)
CO2 SERPL-SCNC: 27 MMOL/L (ref 21–32)
CREAT SERPL-MCNC: 0.75 MG/DL (ref 0.5–1.05)
EGFRCR SERPLBLD CKD-EPI 2021: >90 ML/MIN/1.73M*2
EOSINOPHIL # BLD MANUAL: 0.14 X10*3/UL (ref 0–0.7)
EOSINOPHIL NFR BLD MANUAL: 2 %
ERYTHROCYTE [DISTWIDTH] IN BLOOD BY AUTOMATED COUNT: 11.9 % (ref 11.5–14.5)
GLUCOSE SERPL-MCNC: 95 MG/DL (ref 74–99)
HCT VFR BLD AUTO: 34.9 % (ref 36–46)
HGB BLD-MCNC: 12.2 G/DL (ref 12–16)
IMM GRANULOCYTES # BLD AUTO: 0.04 X10*3/UL (ref 0–0.7)
IMM GRANULOCYTES NFR BLD AUTO: 0.6 % (ref 0–0.9)
LYMPHOCYTES # BLD MANUAL: 2.34 X10*3/UL (ref 1.2–4.8)
LYMPHOCYTES NFR BLD MANUAL: 33 %
MAGNESIUM SERPL-MCNC: 2.18 MG/DL (ref 1.6–2.4)
MCH RBC QN AUTO: 31.1 PG (ref 26–34)
MCHC RBC AUTO-ENTMCNC: 35 G/DL (ref 32–36)
MCV RBC AUTO: 89 FL (ref 80–100)
MONOCYTES # BLD MANUAL: 0.57 X10*3/UL (ref 0.1–1)
MONOCYTES NFR BLD MANUAL: 8 %
MYELOCYTES # BLD MANUAL: 0.07 X10*3/UL
MYELOCYTES NFR BLD MANUAL: 1 %
NEUTS SEG # BLD MANUAL: 3.98 X10*3/UL (ref 1.2–7)
NEUTS SEG NFR BLD MANUAL: 56 %
NRBC BLD-RTO: 0 /100 WBCS (ref 0–0)
PLATELET # BLD AUTO: 86 X10*3/UL (ref 150–450)
POTASSIUM SERPL-SCNC: 4.2 MMOL/L (ref 3.5–5.3)
PROT SERPL-MCNC: 7 G/DL (ref 6.4–8.2)
RBC # BLD AUTO: 3.92 X10*6/UL (ref 4–5.2)
RBC MORPH BLD: NORMAL
SODIUM SERPL-SCNC: 140 MMOL/L (ref 136–145)
TOTAL CELLS COUNTED BLD: 100
WBC # BLD AUTO: 7.1 X10*3/UL (ref 4.4–11.3)

## 2025-08-11 PROCEDURE — 71046 X-RAY EXAM CHEST 2 VIEWS: CPT

## 2025-08-11 PROCEDURE — 93005 ELECTROCARDIOGRAM TRACING: CPT

## 2025-08-11 PROCEDURE — 71046 X-RAY EXAM CHEST 2 VIEWS: CPT | Performed by: RADIOLOGY

## 2025-08-11 PROCEDURE — 85007 BL SMEAR W/DIFF WBC COUNT: CPT | Performed by: EMERGENCY MEDICINE

## 2025-08-11 PROCEDURE — 99285 EMERGENCY DEPT VISIT HI MDM: CPT | Mod: 25 | Performed by: EMERGENCY MEDICINE

## 2025-08-11 PROCEDURE — 84484 ASSAY OF TROPONIN QUANT: CPT | Performed by: EMERGENCY MEDICINE

## 2025-08-11 PROCEDURE — 83735 ASSAY OF MAGNESIUM: CPT | Performed by: EMERGENCY MEDICINE

## 2025-08-11 PROCEDURE — 36415 COLL VENOUS BLD VENIPUNCTURE: CPT | Performed by: EMERGENCY MEDICINE

## 2025-08-11 PROCEDURE — 85027 COMPLETE CBC AUTOMATED: CPT | Performed by: EMERGENCY MEDICINE

## 2025-08-11 PROCEDURE — 80053 COMPREHEN METABOLIC PANEL: CPT | Performed by: EMERGENCY MEDICINE

## 2025-08-11 RX ORDER — KETOROLAC TROMETHAMINE 15 MG/ML
15 INJECTION, SOLUTION INTRAMUSCULAR; INTRAVENOUS ONCE
Status: DISCONTINUED | OUTPATIENT
Start: 2025-08-11 | End: 2025-08-11 | Stop reason: HOSPADM

## 2025-08-11 ASSESSMENT — HEART SCORE
AGE: 45-64
HISTORY: SLIGHTLY SUSPICIOUS
TROPONIN: LESS THAN OR EQUAL TO NORMAL LIMIT
HEART SCORE: 2
RISK FACTORS: 1-2 RISK FACTORS
ECG: NORMAL

## 2025-08-11 ASSESSMENT — LIFESTYLE VARIABLES
HAVE PEOPLE ANNOYED YOU BY CRITICIZING YOUR DRINKING: NO
EVER FELT BAD OR GUILTY ABOUT YOUR DRINKING: NO
HAVE YOU EVER FELT YOU SHOULD CUT DOWN ON YOUR DRINKING: NO
EVER HAD A DRINK FIRST THING IN THE MORNING TO STEADY YOUR NERVES TO GET RID OF A HANGOVER: NO
TOTAL SCORE: 0

## 2025-08-11 ASSESSMENT — PAIN SCALES - GENERAL: PAINLEVEL_OUTOF10: 4

## 2025-08-11 ASSESSMENT — PAIN - FUNCTIONAL ASSESSMENT: PAIN_FUNCTIONAL_ASSESSMENT: 0-10

## 2025-08-12 LAB
ATRIAL RATE: 56 BPM
P AXIS: 51 DEGREES
P OFFSET: 178 MS
P ONSET: 125 MS
PR INTERVAL: 202 MS
Q ONSET: 226 MS
QRS COUNT: 9 BEATS
QRS DURATION: 72 MS
QT INTERVAL: 386 MS
QTC CALCULATION(BAZETT): 372 MS
QTC FREDERICIA: 377 MS
R AXIS: 14 DEGREES
T AXIS: 40 DEGREES
T OFFSET: 419 MS
VENTRICULAR RATE: 56 BPM

## 2025-08-18 ENCOUNTER — TELEPHONE (OUTPATIENT)
Dept: OBSTETRICS AND GYNECOLOGY | Facility: CLINIC | Age: 60
End: 2025-08-18
Payer: COMMERCIAL

## 2025-08-21 ENCOUNTER — HOSPITAL ENCOUNTER (EMERGENCY)
Facility: HOSPITAL | Age: 60
Discharge: HOME | End: 2025-08-21
Payer: COMMERCIAL

## 2025-08-21 VITALS
WEIGHT: 140 LBS | DIASTOLIC BLOOD PRESSURE: 90 MMHG | TEMPERATURE: 98.7 F | OXYGEN SATURATION: 99 % | HEART RATE: 61 BPM | RESPIRATION RATE: 18 BRPM | SYSTOLIC BLOOD PRESSURE: 156 MMHG | BODY MASS INDEX: 28.22 KG/M2 | HEIGHT: 59 IN

## 2025-08-21 DIAGNOSIS — T54.91XA INGESTION OF BLEACH, ACCIDENTAL OR UNINTENTIONAL, INITIAL ENCOUNTER: Primary | ICD-10-CM

## 2025-08-21 PROCEDURE — 2500000005 HC RX 250 GENERAL PHARMACY W/O HCPCS

## 2025-08-21 PROCEDURE — 99283 EMERGENCY DEPT VISIT LOW MDM: CPT

## 2025-08-21 PROCEDURE — 2500000001 HC RX 250 WO HCPCS SELF ADMINISTERED DRUGS (ALT 637 FOR MEDICARE OP)

## 2025-08-21 RX ORDER — LIDOCAINE HYDROCHLORIDE 20 MG/ML
15 SOLUTION OROPHARYNGEAL ONCE
Status: COMPLETED | OUTPATIENT
Start: 2025-08-21 | End: 2025-08-21

## 2025-08-21 RX ORDER — ALUMINUM HYDROXIDE, MAGNESIUM HYDROXIDE, AND SIMETHICONE 1200; 120; 1200 MG/30ML; MG/30ML; MG/30ML
30 SUSPENSION ORAL ONCE
Status: COMPLETED | OUTPATIENT
Start: 2025-08-21 | End: 2025-08-21

## 2025-08-21 RX ADMIN — ALUMINUM HYDROXIDE, MAGNESIUM HYDROXIDE, AND SIMETHICONE 30 ML: 200; 200; 20 SUSPENSION ORAL at 11:59

## 2025-08-21 RX ADMIN — LIDOCAINE HYDROCHLORIDE 15 ML: 20 SOLUTION ORAL at 11:59

## 2025-08-21 ASSESSMENT — PAIN SCALES - GENERAL: PAINLEVEL_OUTOF10: 0 - NO PAIN

## 2025-08-26 ENCOUNTER — APPOINTMENT (OUTPATIENT)
Dept: PRIMARY CARE | Facility: CLINIC | Age: 60
End: 2025-08-26
Payer: COMMERCIAL

## 2025-08-29 ENCOUNTER — TELEPHONE (OUTPATIENT)
Dept: OBSTETRICS AND GYNECOLOGY | Facility: CLINIC | Age: 60
End: 2025-08-29
Payer: COMMERCIAL

## 2025-08-29 DIAGNOSIS — R30.0 BURNING WITH URINATION: Primary | ICD-10-CM

## 2025-08-31 LAB — BACTERIA UR CULT: NORMAL

## 2025-09-04 ENCOUNTER — TELEPHONE (OUTPATIENT)
Dept: OBSTETRICS AND GYNECOLOGY | Facility: CLINIC | Age: 60
End: 2025-09-04
Payer: COMMERCIAL

## 2025-09-04 DIAGNOSIS — N89.8 VAGINAL IRRITATION: Primary | ICD-10-CM

## 2025-09-04 RX ORDER — GLYCERIN/MIN OIL/POLYCARBOPHIL
1 GEL WITH APPLICATOR (GRAM) VAGINAL AS NEEDED
Qty: 35 G | Refills: 1 | Status: SHIPPED | OUTPATIENT
Start: 2025-09-04

## 2025-09-05 ENCOUNTER — TELEPHONE (OUTPATIENT)
Dept: OBSTETRICS AND GYNECOLOGY | Facility: CLINIC | Age: 60
End: 2025-09-05
Payer: COMMERCIAL

## 2025-09-05 DIAGNOSIS — B37.9 YEAST INFECTION: Primary | ICD-10-CM

## 2025-09-05 RX ORDER — FLUCONAZOLE 150 MG/1
150 TABLET ORAL ONCE
Qty: 1 TABLET | Refills: 0 | Status: SHIPPED | OUTPATIENT
Start: 2025-09-05 | End: 2025-09-05

## 2025-09-18 ENCOUNTER — APPOINTMENT (OUTPATIENT)
Dept: RADIOLOGY | Facility: HOSPITAL | Age: 60
End: 2025-09-18
Payer: COMMERCIAL

## 2025-09-22 ENCOUNTER — APPOINTMENT (OUTPATIENT)
Dept: RADIOLOGY | Facility: HOSPITAL | Age: 60
End: 2025-09-22
Payer: COMMERCIAL

## 2025-09-24 ENCOUNTER — APPOINTMENT (OUTPATIENT)
Dept: AUDIOLOGY | Facility: CLINIC | Age: 60
End: 2025-09-24
Payer: COMMERCIAL

## 2025-09-24 ENCOUNTER — APPOINTMENT (OUTPATIENT)
Dept: HEMATOLOGY/ONCOLOGY | Facility: CLINIC | Age: 60
End: 2025-09-24
Payer: COMMERCIAL

## 2025-10-20 ENCOUNTER — APPOINTMENT (OUTPATIENT)
Dept: SLEEP MEDICINE | Facility: CLINIC | Age: 60
End: 2025-10-20
Payer: COMMERCIAL